# Patient Record
Sex: FEMALE | Race: OTHER
[De-identification: names, ages, dates, MRNs, and addresses within clinical notes are randomized per-mention and may not be internally consistent; named-entity substitution may affect disease eponyms.]

---

## 2023-03-07 ENCOUNTER — APPOINTMENT (OUTPATIENT)
Dept: PEDIATRIC CARDIOLOGY | Facility: CLINIC | Age: 33
End: 2023-03-07
Payer: COMMERCIAL

## 2023-03-07 PROBLEM — Z00.00 ENCOUNTER FOR PREVENTIVE HEALTH EXAMINATION: Status: ACTIVE | Noted: 2023-03-07

## 2023-03-07 PROCEDURE — 99202 OFFICE O/P NEW SF 15 MIN: CPT | Mod: 25

## 2023-03-07 PROCEDURE — 76825 ECHO EXAM OF FETAL HEART: CPT

## 2023-03-07 PROCEDURE — 76827 ECHO EXAM OF FETAL HEART: CPT

## 2023-03-07 PROCEDURE — 93325 DOPPLER ECHO COLOR FLOW MAPG: CPT

## 2023-06-06 ENCOUNTER — INPATIENT (INPATIENT)
Facility: HOSPITAL | Age: 33
LOS: 11 days | Discharge: HOME HEALTH | End: 2023-06-18
Attending: INTERNAL MEDICINE | Admitting: OBSTETRICS & GYNECOLOGY
Payer: COMMERCIAL

## 2023-06-06 VITALS — HEIGHT: 63 IN | WEIGHT: 205.03 LBS

## 2023-06-06 DIAGNOSIS — O26.899 OTHER SPECIFIED PREGNANCY RELATED CONDITIONS, UNSPECIFIED TRIMESTER: ICD-10-CM

## 2023-06-06 DIAGNOSIS — Z3A.00 WEEKS OF GESTATION OF PREGNANCY NOT SPECIFIED: ICD-10-CM

## 2023-06-06 LAB
ALBUMIN SERPL ELPH-MCNC: 3.3 G/DL — SIGNIFICANT CHANGE UP (ref 3.3–5)
ALP SERPL-CCNC: 255 U/L — HIGH (ref 40–120)
ALT FLD-CCNC: 11 U/L — SIGNIFICANT CHANGE UP (ref 10–45)
ANION GAP SERPL CALC-SCNC: 13 MMOL/L — SIGNIFICANT CHANGE UP (ref 5–17)
APTT BLD: 27.5 SEC — SIGNIFICANT CHANGE UP (ref 27.5–35.5)
AST SERPL-CCNC: 22 U/L — SIGNIFICANT CHANGE UP (ref 10–40)
BASOPHILS # BLD AUTO: 0.04 K/UL — SIGNIFICANT CHANGE UP (ref 0–0.2)
BASOPHILS NFR BLD AUTO: 0.5 % — SIGNIFICANT CHANGE UP (ref 0–2)
BILIRUB SERPL-MCNC: 0.7 MG/DL — SIGNIFICANT CHANGE UP (ref 0.2–1.2)
BLD GP AB SCN SERPL QL: NEGATIVE — SIGNIFICANT CHANGE UP
BUN SERPL-MCNC: 11 MG/DL — SIGNIFICANT CHANGE UP (ref 7–23)
CALCIUM SERPL-MCNC: 8.8 MG/DL — SIGNIFICANT CHANGE UP (ref 8.4–10.5)
CHLORIDE SERPL-SCNC: 105 MMOL/L — SIGNIFICANT CHANGE UP (ref 96–108)
CO2 SERPL-SCNC: 18 MMOL/L — LOW (ref 22–31)
CREAT ?TM UR-MCNC: 201 MG/DL — SIGNIFICANT CHANGE UP
CREAT SERPL-MCNC: 0.95 MG/DL — SIGNIFICANT CHANGE UP (ref 0.5–1.3)
EGFR: 82 ML/MIN/1.73M2 — SIGNIFICANT CHANGE UP
EOSINOPHIL # BLD AUTO: 0.05 K/UL — SIGNIFICANT CHANGE UP (ref 0–0.5)
EOSINOPHIL NFR BLD AUTO: 0.6 % — SIGNIFICANT CHANGE UP (ref 0–6)
FIBRINOGEN PPP-MCNC: 353 MG/DL — SIGNIFICANT CHANGE UP (ref 200–445)
GLUCOSE SERPL-MCNC: 72 MG/DL — SIGNIFICANT CHANGE UP (ref 70–99)
HCT VFR BLD CALC: 30.6 % — LOW (ref 34.5–45)
HGB BLD-MCNC: 9.5 G/DL — LOW (ref 11.5–15.5)
IMM GRANULOCYTES NFR BLD AUTO: 0.9 % — SIGNIFICANT CHANGE UP (ref 0–0.9)
INR BLD: 0.88 — SIGNIFICANT CHANGE UP (ref 0.88–1.16)
LDH SERPL L TO P-CCNC: 203 U/L — SIGNIFICANT CHANGE UP (ref 50–242)
LYMPHOCYTES # BLD AUTO: 1.96 K/UL — SIGNIFICANT CHANGE UP (ref 1–3.3)
LYMPHOCYTES # BLD AUTO: 24.3 % — SIGNIFICANT CHANGE UP (ref 13–44)
MCHC RBC-ENTMCNC: 26.6 PG — LOW (ref 27–34)
MCHC RBC-ENTMCNC: 31 GM/DL — LOW (ref 32–36)
MCV RBC AUTO: 85.7 FL — SIGNIFICANT CHANGE UP (ref 80–100)
MONOCYTES # BLD AUTO: 0.83 K/UL — SIGNIFICANT CHANGE UP (ref 0–0.9)
MONOCYTES NFR BLD AUTO: 10.3 % — SIGNIFICANT CHANGE UP (ref 2–14)
NEUTROPHILS # BLD AUTO: 5.12 K/UL — SIGNIFICANT CHANGE UP (ref 1.8–7.4)
NEUTROPHILS NFR BLD AUTO: 63.4 % — SIGNIFICANT CHANGE UP (ref 43–77)
NRBC # BLD: 3 /100 WBCS — HIGH (ref 0–0)
PLATELET # BLD AUTO: 153 K/UL — SIGNIFICANT CHANGE UP (ref 150–400)
POTASSIUM SERPL-MCNC: 4.2 MMOL/L — SIGNIFICANT CHANGE UP (ref 3.5–5.3)
POTASSIUM SERPL-SCNC: 4.2 MMOL/L — SIGNIFICANT CHANGE UP (ref 3.5–5.3)
PROT ?TM UR-MCNC: 367.6 MG/DL — SIGNIFICANT CHANGE UP (ref 0–12)
PROT SERPL-MCNC: 6.1 G/DL — SIGNIFICANT CHANGE UP (ref 6–8.3)
PROT/CREAT UR-RTO: 1.8 RATIO — SIGNIFICANT CHANGE UP (ref 0–0.2)
PROTHROM AB SERPL-ACNC: 10.5 SEC — SIGNIFICANT CHANGE UP (ref 10.5–13.4)
RBC # BLD: 3.57 M/UL — LOW (ref 3.8–5.2)
RBC # FLD: 17 % — HIGH (ref 10.3–14.5)
RH IG SCN BLD-IMP: POSITIVE — SIGNIFICANT CHANGE UP
RH IG SCN BLD-IMP: POSITIVE — SIGNIFICANT CHANGE UP
SODIUM SERPL-SCNC: 136 MMOL/L — SIGNIFICANT CHANGE UP (ref 135–145)
URATE SERPL-MCNC: 7.4 MG/DL — HIGH (ref 2.5–7)
WBC # BLD: 8.07 K/UL — SIGNIFICANT CHANGE UP (ref 3.8–10.5)
WBC # FLD AUTO: 8.07 K/UL — SIGNIFICANT CHANGE UP (ref 3.8–10.5)

## 2023-06-06 RX ORDER — SODIUM CHLORIDE 9 MG/ML
1000 INJECTION, SOLUTION INTRAVENOUS
Refills: 0 | Status: DISCONTINUED | OUTPATIENT
Start: 2023-06-06 | End: 2023-06-07

## 2023-06-06 RX ORDER — OXYTOCIN 10 UNIT/ML
VIAL (ML) INJECTION
Qty: 30 | Refills: 0 | Status: DISCONTINUED | OUTPATIENT
Start: 2023-06-06 | End: 2023-06-07

## 2023-06-06 RX ORDER — OXYTOCIN 10 UNIT/ML
333.33 VIAL (ML) INJECTION
Qty: 20 | Refills: 0 | Status: DISCONTINUED | OUTPATIENT
Start: 2023-06-06 | End: 2023-06-07

## 2023-06-06 RX ORDER — ONDANSETRON 8 MG/1
8 TABLET, FILM COATED ORAL ONCE
Refills: 0 | Status: COMPLETED | OUTPATIENT
Start: 2023-06-06 | End: 2023-06-06

## 2023-06-06 RX ADMIN — SODIUM CHLORIDE 125 MILLILITER(S): 9 INJECTION, SOLUTION INTRAVENOUS at 23:05

## 2023-06-06 RX ADMIN — ONDANSETRON 8 MILLIGRAM(S): 8 TABLET, FILM COATED ORAL at 23:42

## 2023-06-06 NOTE — PATIENT PROFILE OB - HEIGHT IN FEET
5 Advancement-Rotation Flap Text: The defect edges were debeveled with a #15c scalpel blade.  Given the location of the defect, shape of the defect and the proximity to free margins an advancement-rotation flap was deemed most appropriate.  Using a sterile surgical marker, an appropriate flap was drawn incorporating the defect and placing the expected incisions within the relaxed skin tension lines where possible. The area thus outlined was incised deep to adipose tissue with a #15c scalpel blade.  The skin margins were undermined to an appropriate distance in all directions utilizing iris scissors.

## 2023-06-06 NOTE — PATIENT PROFILE OB - AS DELIV COMPLICATIONS OB
abnormal fetal heart rate tracing/chorioamnionitis/maternal fever/peripartum hemorrhage/meconium stained fluid

## 2023-06-07 LAB
ALBUMIN SERPL ELPH-MCNC: 3.1 G/DL — LOW (ref 3.3–5)
ALBUMIN SERPL ELPH-MCNC: 3.2 G/DL — LOW (ref 3.3–5)
ALBUMIN SERPL ELPH-MCNC: 3.3 G/DL — SIGNIFICANT CHANGE UP (ref 3.3–5)
ALP SERPL-CCNC: 244 U/L — HIGH (ref 40–120)
ALP SERPL-CCNC: 258 U/L — HIGH (ref 40–120)
ALP SERPL-CCNC: 269 U/L — HIGH (ref 40–120)
ALT FLD-CCNC: 11 U/L — SIGNIFICANT CHANGE UP (ref 10–45)
ALT FLD-CCNC: 12 U/L — SIGNIFICANT CHANGE UP (ref 10–45)
ALT FLD-CCNC: 13 U/L — SIGNIFICANT CHANGE UP (ref 10–45)
ANION GAP SERPL CALC-SCNC: 14 MMOL/L — SIGNIFICANT CHANGE UP (ref 5–17)
ANION GAP SERPL CALC-SCNC: 14 MMOL/L — SIGNIFICANT CHANGE UP (ref 5–17)
ANION GAP SERPL CALC-SCNC: 9 MMOL/L — SIGNIFICANT CHANGE UP (ref 5–17)
APTT BLD: 23.9 SEC — LOW (ref 27.5–35.5)
APTT BLD: 27.6 SEC — SIGNIFICANT CHANGE UP (ref 27.5–35.5)
APTT BLD: 27.9 SEC — SIGNIFICANT CHANGE UP (ref 27.5–35.5)
APTT BLD: 32.1 SEC — SIGNIFICANT CHANGE UP (ref 27.5–35.5)
AST SERPL-CCNC: 19 U/L — SIGNIFICANT CHANGE UP (ref 10–40)
AST SERPL-CCNC: 20 U/L — SIGNIFICANT CHANGE UP (ref 10–40)
AST SERPL-CCNC: 24 U/L — SIGNIFICANT CHANGE UP (ref 10–40)
BASOPHILS # BLD AUTO: 0.02 K/UL — SIGNIFICANT CHANGE UP (ref 0–0.2)
BASOPHILS # BLD AUTO: 0.03 K/UL — SIGNIFICANT CHANGE UP (ref 0–0.2)
BASOPHILS # BLD AUTO: 0.03 K/UL — SIGNIFICANT CHANGE UP (ref 0–0.2)
BASOPHILS # BLD AUTO: 0.04 K/UL — SIGNIFICANT CHANGE UP (ref 0–0.2)
BASOPHILS NFR BLD AUTO: 0.1 % — SIGNIFICANT CHANGE UP (ref 0–2)
BASOPHILS NFR BLD AUTO: 0.2 % — SIGNIFICANT CHANGE UP (ref 0–2)
BASOPHILS NFR BLD AUTO: 0.2 % — SIGNIFICANT CHANGE UP (ref 0–2)
BASOPHILS NFR BLD AUTO: 0.3 % — SIGNIFICANT CHANGE UP (ref 0–2)
BILIRUB SERPL-MCNC: 0.6 MG/DL — SIGNIFICANT CHANGE UP (ref 0.2–1.2)
BILIRUB SERPL-MCNC: 0.8 MG/DL — SIGNIFICANT CHANGE UP (ref 0.2–1.2)
BILIRUB SERPL-MCNC: 1.1 MG/DL — SIGNIFICANT CHANGE UP (ref 0.2–1.2)
BUN SERPL-MCNC: 8 MG/DL — SIGNIFICANT CHANGE UP (ref 7–23)
BUN SERPL-MCNC: 9 MG/DL — SIGNIFICANT CHANGE UP (ref 7–23)
BUN SERPL-MCNC: 9 MG/DL — SIGNIFICANT CHANGE UP (ref 7–23)
CALCIUM SERPL-MCNC: 8.7 MG/DL — SIGNIFICANT CHANGE UP (ref 8.4–10.5)
CALCIUM SERPL-MCNC: 9 MG/DL — SIGNIFICANT CHANGE UP (ref 8.4–10.5)
CALCIUM SERPL-MCNC: 9.3 MG/DL — SIGNIFICANT CHANGE UP (ref 8.4–10.5)
CHLORIDE SERPL-SCNC: 104 MMOL/L — SIGNIFICANT CHANGE UP (ref 96–108)
CHLORIDE SERPL-SCNC: 106 MMOL/L — SIGNIFICANT CHANGE UP (ref 96–108)
CHLORIDE SERPL-SCNC: 107 MMOL/L — SIGNIFICANT CHANGE UP (ref 96–108)
CO2 SERPL-SCNC: 16 MMOL/L — LOW (ref 22–31)
CO2 SERPL-SCNC: 17 MMOL/L — LOW (ref 22–31)
CO2 SERPL-SCNC: 20 MMOL/L — LOW (ref 22–31)
COVID-19 SPIKE DOMAIN AB INTERP: POSITIVE
COVID-19 SPIKE DOMAIN ANTIBODY RESULT: >250 U/ML — HIGH
CREAT SERPL-MCNC: 1.02 MG/DL — SIGNIFICANT CHANGE UP (ref 0.5–1.3)
CREAT SERPL-MCNC: 1.07 MG/DL — SIGNIFICANT CHANGE UP (ref 0.5–1.3)
CREAT SERPL-MCNC: 1.25 MG/DL — SIGNIFICANT CHANGE UP (ref 0.5–1.3)
EGFR: 59 ML/MIN/1.73M2 — LOW
EGFR: 71 ML/MIN/1.73M2 — SIGNIFICANT CHANGE UP
EGFR: 75 ML/MIN/1.73M2 — SIGNIFICANT CHANGE UP
EOSINOPHIL # BLD AUTO: 0 K/UL — SIGNIFICANT CHANGE UP (ref 0–0.5)
EOSINOPHIL # BLD AUTO: 0 K/UL — SIGNIFICANT CHANGE UP (ref 0–0.5)
EOSINOPHIL # BLD AUTO: 0.01 K/UL — SIGNIFICANT CHANGE UP (ref 0–0.5)
EOSINOPHIL # BLD AUTO: 0.01 K/UL — SIGNIFICANT CHANGE UP (ref 0–0.5)
EOSINOPHIL NFR BLD AUTO: 0 % — SIGNIFICANT CHANGE UP (ref 0–6)
EOSINOPHIL NFR BLD AUTO: 0 % — SIGNIFICANT CHANGE UP (ref 0–6)
EOSINOPHIL NFR BLD AUTO: 0.1 % — SIGNIFICANT CHANGE UP (ref 0–6)
EOSINOPHIL NFR BLD AUTO: 0.1 % — SIGNIFICANT CHANGE UP (ref 0–6)
FIBRINOGEN PPP-MCNC: 349 MG/DL — SIGNIFICANT CHANGE UP (ref 200–445)
FIBRINOGEN PPP-MCNC: 355 MG/DL — SIGNIFICANT CHANGE UP (ref 200–445)
FIBRINOGEN PPP-MCNC: 411 MG/DL — SIGNIFICANT CHANGE UP (ref 200–445)
FIBRINOGEN PPP-MCNC: 418 MG/DL — SIGNIFICANT CHANGE UP (ref 200–445)
GLUCOSE SERPL-MCNC: 76 MG/DL — SIGNIFICANT CHANGE UP (ref 70–99)
GLUCOSE SERPL-MCNC: 77 MG/DL — SIGNIFICANT CHANGE UP (ref 70–99)
GLUCOSE SERPL-MCNC: 77 MG/DL — SIGNIFICANT CHANGE UP (ref 70–99)
HCT VFR BLD CALC: 31.3 % — LOW (ref 34.5–45)
HCT VFR BLD CALC: 32.8 % — LOW (ref 34.5–45)
HCT VFR BLD CALC: 33 % — LOW (ref 34.5–45)
HCT VFR BLD CALC: 33.7 % — LOW (ref 34.5–45)
HGB BLD-MCNC: 10.2 G/DL — LOW (ref 11.5–15.5)
HGB BLD-MCNC: 10.2 G/DL — LOW (ref 11.5–15.5)
HGB BLD-MCNC: 10.5 G/DL — LOW (ref 11.5–15.5)
HGB BLD-MCNC: 9.7 G/DL — LOW (ref 11.5–15.5)
IMM GRANULOCYTES NFR BLD AUTO: 0.5 % — SIGNIFICANT CHANGE UP (ref 0–0.9)
IMM GRANULOCYTES NFR BLD AUTO: 0.6 % — SIGNIFICANT CHANGE UP (ref 0–0.9)
IMM GRANULOCYTES NFR BLD AUTO: 0.8 % — SIGNIFICANT CHANGE UP (ref 0–0.9)
IMM GRANULOCYTES NFR BLD AUTO: 0.9 % — SIGNIFICANT CHANGE UP (ref 0–0.9)
INR BLD: 0.91 — SIGNIFICANT CHANGE UP (ref 0.88–1.16)
INR BLD: 0.91 — SIGNIFICANT CHANGE UP (ref 0.88–1.16)
INR BLD: 0.93 — SIGNIFICANT CHANGE UP (ref 0.88–1.16)
LDH SERPL L TO P-CCNC: 206 U/L — SIGNIFICANT CHANGE UP (ref 50–242)
LDH SERPL L TO P-CCNC: 229 U/L — SIGNIFICANT CHANGE UP (ref 50–242)
LYMPHOCYTES # BLD AUTO: 0.89 K/UL — LOW (ref 1–3.3)
LYMPHOCYTES # BLD AUTO: 1.44 K/UL — SIGNIFICANT CHANGE UP (ref 1–3.3)
LYMPHOCYTES # BLD AUTO: 1.48 K/UL — SIGNIFICANT CHANGE UP (ref 1–3.3)
LYMPHOCYTES # BLD AUTO: 1.59 K/UL — SIGNIFICANT CHANGE UP (ref 1–3.3)
LYMPHOCYTES # BLD AUTO: 12.6 % — LOW (ref 13–44)
LYMPHOCYTES # BLD AUTO: 14.9 % — SIGNIFICANT CHANGE UP (ref 13–44)
LYMPHOCYTES # BLD AUTO: 5.2 % — LOW (ref 13–44)
LYMPHOCYTES # BLD AUTO: 7.8 % — LOW (ref 13–44)
MCHC RBC-ENTMCNC: 26.9 PG — LOW (ref 27–34)
MCHC RBC-ENTMCNC: 26.9 PG — LOW (ref 27–34)
MCHC RBC-ENTMCNC: 27 PG — SIGNIFICANT CHANGE UP (ref 27–34)
MCHC RBC-ENTMCNC: 27.1 PG — SIGNIFICANT CHANGE UP (ref 27–34)
MCHC RBC-ENTMCNC: 30.9 GM/DL — LOW (ref 32–36)
MCHC RBC-ENTMCNC: 31 GM/DL — LOW (ref 32–36)
MCHC RBC-ENTMCNC: 31.1 GM/DL — LOW (ref 32–36)
MCHC RBC-ENTMCNC: 31.2 GM/DL — LOW (ref 32–36)
MCV RBC AUTO: 86.4 FL — SIGNIFICANT CHANGE UP (ref 80–100)
MCV RBC AUTO: 87.1 FL — SIGNIFICANT CHANGE UP (ref 80–100)
MCV RBC AUTO: 87.2 FL — SIGNIFICANT CHANGE UP (ref 80–100)
MCV RBC AUTO: 87.2 FL — SIGNIFICANT CHANGE UP (ref 80–100)
MONOCYTES # BLD AUTO: 0.5 K/UL — SIGNIFICANT CHANGE UP (ref 0–0.9)
MONOCYTES # BLD AUTO: 0.74 K/UL — SIGNIFICANT CHANGE UP (ref 0–0.9)
MONOCYTES # BLD AUTO: 0.81 K/UL — SIGNIFICANT CHANGE UP (ref 0–0.9)
MONOCYTES # BLD AUTO: 1.08 K/UL — HIGH (ref 0–0.9)
MONOCYTES NFR BLD AUTO: 4.2 % — SIGNIFICANT CHANGE UP (ref 2–14)
MONOCYTES NFR BLD AUTO: 4.3 % — SIGNIFICANT CHANGE UP (ref 2–14)
MONOCYTES NFR BLD AUTO: 5.3 % — SIGNIFICANT CHANGE UP (ref 2–14)
MONOCYTES NFR BLD AUTO: 8.4 % — SIGNIFICANT CHANGE UP (ref 2–14)
NEUTROPHILS # BLD AUTO: 15.33 K/UL — HIGH (ref 1.8–7.4)
NEUTROPHILS # BLD AUTO: 17.39 K/UL — HIGH (ref 1.8–7.4)
NEUTROPHILS # BLD AUTO: 7.28 K/UL — SIGNIFICANT CHANGE UP (ref 1.8–7.4)
NEUTROPHILS # BLD AUTO: 9.7 K/UL — HIGH (ref 1.8–7.4)
NEUTROPHILS NFR BLD AUTO: 75.6 % — SIGNIFICANT CHANGE UP (ref 43–77)
NEUTROPHILS NFR BLD AUTO: 82.2 % — HIGH (ref 43–77)
NEUTROPHILS NFR BLD AUTO: 85.9 % — HIGH (ref 43–77)
NEUTROPHILS NFR BLD AUTO: 89.8 % — HIGH (ref 43–77)
NRBC # BLD: 0 /100 WBCS — SIGNIFICANT CHANGE UP (ref 0–0)
PLATELET # BLD AUTO: 125 K/UL — LOW (ref 150–400)
PLATELET # BLD AUTO: 145 K/UL — LOW (ref 150–400)
PLATELET # BLD AUTO: 149 K/UL — LOW (ref 150–400)
PLATELET # BLD AUTO: 149 K/UL — LOW (ref 150–400)
POTASSIUM SERPL-MCNC: 3.8 MMOL/L — SIGNIFICANT CHANGE UP (ref 3.5–5.3)
POTASSIUM SERPL-MCNC: 4.3 MMOL/L — SIGNIFICANT CHANGE UP (ref 3.5–5.3)
POTASSIUM SERPL-MCNC: 4.5 MMOL/L — SIGNIFICANT CHANGE UP (ref 3.5–5.3)
POTASSIUM SERPL-SCNC: 3.8 MMOL/L — SIGNIFICANT CHANGE UP (ref 3.5–5.3)
POTASSIUM SERPL-SCNC: 4.3 MMOL/L — SIGNIFICANT CHANGE UP (ref 3.5–5.3)
POTASSIUM SERPL-SCNC: 4.5 MMOL/L — SIGNIFICANT CHANGE UP (ref 3.5–5.3)
PROT SERPL-MCNC: 5.6 G/DL — LOW (ref 6–8.3)
PROT SERPL-MCNC: 6 G/DL — SIGNIFICANT CHANGE UP (ref 6–8.3)
PROT SERPL-MCNC: 6.3 G/DL — SIGNIFICANT CHANGE UP (ref 6–8.3)
PROTHROM AB SERPL-ACNC: 10.8 SEC — SIGNIFICANT CHANGE UP (ref 10.5–13.4)
PROTHROM AB SERPL-ACNC: 10.8 SEC — SIGNIFICANT CHANGE UP (ref 10.5–13.4)
PROTHROM AB SERPL-ACNC: 11 SEC — SIGNIFICANT CHANGE UP (ref 10.5–13.4)
RBC # BLD: 3.59 M/UL — LOW (ref 3.8–5.2)
RBC # BLD: 3.76 M/UL — LOW (ref 3.8–5.2)
RBC # BLD: 3.79 M/UL — LOW (ref 3.8–5.2)
RBC # BLD: 3.9 M/UL — SIGNIFICANT CHANGE UP (ref 3.8–5.2)
RBC # FLD: 17.4 % — HIGH (ref 10.3–14.5)
RBC # FLD: 18.2 % — HIGH (ref 10.3–14.5)
RBC # FLD: 18.5 % — HIGH (ref 10.3–14.5)
RBC # FLD: 18.6 % — HIGH (ref 10.3–14.5)
SARS-COV-2 IGG+IGM SERPL QL IA: >250 U/ML — HIGH
SARS-COV-2 IGG+IGM SERPL QL IA: POSITIVE
SODIUM SERPL-SCNC: 134 MMOL/L — LOW (ref 135–145)
SODIUM SERPL-SCNC: 136 MMOL/L — SIGNIFICANT CHANGE UP (ref 135–145)
SODIUM SERPL-SCNC: 137 MMOL/L — SIGNIFICANT CHANGE UP (ref 135–145)
T PALLIDUM AB TITR SER: NEGATIVE — SIGNIFICANT CHANGE UP
URATE SERPL-MCNC: 7.7 MG/DL — HIGH (ref 2.5–7)
URATE SERPL-MCNC: 8.1 MG/DL — HIGH (ref 2.5–7)
WBC # BLD: 11.79 K/UL — HIGH (ref 3.8–10.5)
WBC # BLD: 17.09 K/UL — HIGH (ref 3.8–10.5)
WBC # BLD: 20.28 K/UL — HIGH (ref 3.8–10.5)
WBC # BLD: 9.64 K/UL — SIGNIFICANT CHANGE UP (ref 3.8–10.5)
WBC # FLD AUTO: 11.79 K/UL — HIGH (ref 3.8–10.5)
WBC # FLD AUTO: 17.09 K/UL — HIGH (ref 3.8–10.5)
WBC # FLD AUTO: 20.28 K/UL — HIGH (ref 3.8–10.5)
WBC # FLD AUTO: 9.64 K/UL — SIGNIFICANT CHANGE UP (ref 3.8–10.5)

## 2023-06-07 PROCEDURE — 88307 TISSUE EXAM BY PATHOLOGIST: CPT | Mod: 26

## 2023-06-07 RX ORDER — OXYCODONE HYDROCHLORIDE 5 MG/1
5 TABLET ORAL
Refills: 0 | Status: DISCONTINUED | OUTPATIENT
Start: 2023-06-07 | End: 2023-06-07

## 2023-06-07 RX ORDER — AER TRAVELER 0.5 G/1
1 SOLUTION RECTAL; TOPICAL EVERY 4 HOURS
Refills: 0 | Status: DISCONTINUED | OUTPATIENT
Start: 2023-06-07 | End: 2023-06-18

## 2023-06-07 RX ORDER — SODIUM CHLORIDE 9 MG/ML
3 INJECTION INTRAMUSCULAR; INTRAVENOUS; SUBCUTANEOUS EVERY 8 HOURS
Refills: 0 | Status: DISCONTINUED | OUTPATIENT
Start: 2023-06-07 | End: 2023-06-18

## 2023-06-07 RX ORDER — AMPICILLIN TRIHYDRATE 250 MG
2 CAPSULE ORAL EVERY 6 HOURS
Refills: 0 | Status: DISCONTINUED | OUTPATIENT
Start: 2023-06-07 | End: 2023-06-07

## 2023-06-07 RX ORDER — CEFAZOLIN SODIUM 1 G
VIAL (EA) INJECTION
Refills: 0 | Status: DISCONTINUED | OUTPATIENT
Start: 2023-06-07 | End: 2023-06-08

## 2023-06-07 RX ORDER — OXYTOCIN 10 UNIT/ML
41.67 VIAL (ML) INJECTION
Qty: 20 | Refills: 0 | Status: DISCONTINUED | OUTPATIENT
Start: 2023-06-07 | End: 2023-06-08

## 2023-06-07 RX ORDER — TRANEXAMIC ACID 100 MG/ML
1000 INJECTION, SOLUTION INTRAVENOUS ONCE
Refills: 0 | Status: COMPLETED | OUTPATIENT
Start: 2023-06-07 | End: 2023-06-07

## 2023-06-07 RX ORDER — BENZOCAINE 10 %
1 GEL (GRAM) MUCOUS MEMBRANE EVERY 6 HOURS
Refills: 0 | Status: DISCONTINUED | OUTPATIENT
Start: 2023-06-07 | End: 2023-06-18

## 2023-06-07 RX ORDER — CEFAZOLIN SODIUM 1 G
2000 VIAL (EA) INJECTION ONCE
Refills: 0 | Status: COMPLETED | OUTPATIENT
Start: 2023-06-07 | End: 2023-06-07

## 2023-06-07 RX ORDER — ACETAMINOPHEN 500 MG
975 TABLET ORAL
Refills: 0 | Status: DISCONTINUED | OUTPATIENT
Start: 2023-06-07 | End: 2023-06-18

## 2023-06-07 RX ORDER — KETOROLAC TROMETHAMINE 30 MG/ML
30 SYRINGE (ML) INJECTION ONCE
Refills: 0 | Status: DISCONTINUED | OUTPATIENT
Start: 2023-06-07 | End: 2023-06-07

## 2023-06-07 RX ORDER — MAGNESIUM SULFATE 500 MG/ML
4 VIAL (ML) INJECTION ONCE
Refills: 0 | Status: COMPLETED | OUTPATIENT
Start: 2023-06-07 | End: 2023-06-07

## 2023-06-07 RX ORDER — OXYCODONE HYDROCHLORIDE 5 MG/1
5 TABLET ORAL ONCE
Refills: 0 | Status: DISCONTINUED | OUTPATIENT
Start: 2023-06-07 | End: 2023-06-14

## 2023-06-07 RX ORDER — IBUPROFEN 200 MG
600 TABLET ORAL EVERY 6 HOURS
Refills: 0 | Status: DISCONTINUED | OUTPATIENT
Start: 2023-06-07 | End: 2023-06-08

## 2023-06-07 RX ORDER — NALOXONE HYDROCHLORIDE 4 MG/.1ML
0.1 SPRAY NASAL
Refills: 0 | Status: DISCONTINUED | OUTPATIENT
Start: 2023-06-07 | End: 2023-06-07

## 2023-06-07 RX ORDER — CEFAZOLIN SODIUM 1 G
2000 VIAL (EA) INJECTION EVERY 8 HOURS
Refills: 0 | Status: DISCONTINUED | OUTPATIENT
Start: 2023-06-08 | End: 2023-06-08

## 2023-06-07 RX ORDER — OXYTOCIN 10 UNIT/ML
6 VIAL (ML) INJECTION
Qty: 30 | Refills: 0 | Status: DISCONTINUED | OUTPATIENT
Start: 2023-06-07 | End: 2023-06-07

## 2023-06-07 RX ORDER — GENTAMICIN SULFATE 40 MG/ML
340 VIAL (ML) INJECTION EVERY 24 HOURS
Refills: 0 | Status: DISCONTINUED | OUTPATIENT
Start: 2023-06-07 | End: 2023-06-08

## 2023-06-07 RX ORDER — MAGNESIUM SULFATE 500 MG/ML
2 VIAL (ML) INJECTION
Qty: 40 | Refills: 0 | Status: DISCONTINUED | OUTPATIENT
Start: 2023-06-07 | End: 2023-06-08

## 2023-06-07 RX ORDER — TETANUS TOXOID, REDUCED DIPHTHERIA TOXOID AND ACELLULAR PERTUSSIS VACCINE, ADSORBED 5; 2.5; 8; 8; 2.5 [IU]/.5ML; [IU]/.5ML; UG/.5ML; UG/.5ML; UG/.5ML
0.5 SUSPENSION INTRAMUSCULAR ONCE
Refills: 0 | Status: DISCONTINUED | OUTPATIENT
Start: 2023-06-07 | End: 2023-06-18

## 2023-06-07 RX ORDER — ACETAMINOPHEN 500 MG
1000 TABLET ORAL ONCE
Refills: 0 | Status: COMPLETED | OUTPATIENT
Start: 2023-06-07 | End: 2023-06-07

## 2023-06-07 RX ORDER — DEXAMETHASONE 0.5 MG/5ML
4 ELIXIR ORAL EVERY 6 HOURS
Refills: 0 | Status: DISCONTINUED | OUTPATIENT
Start: 2023-06-07 | End: 2023-06-07

## 2023-06-07 RX ORDER — LANOLIN
1 OINTMENT (GRAM) TOPICAL EVERY 6 HOURS
Refills: 0 | Status: DISCONTINUED | OUTPATIENT
Start: 2023-06-07 | End: 2023-06-18

## 2023-06-07 RX ORDER — CARBOPROST TROMETHAMINE 250 UG/ML
250 INJECTION, SOLUTION INTRAMUSCULAR ONCE
Refills: 0 | Status: COMPLETED | OUTPATIENT
Start: 2023-06-07 | End: 2023-06-07

## 2023-06-07 RX ORDER — SIMETHICONE 80 MG/1
80 TABLET, CHEWABLE ORAL EVERY 4 HOURS
Refills: 0 | Status: DISCONTINUED | OUTPATIENT
Start: 2023-06-07 | End: 2023-06-18

## 2023-06-07 RX ORDER — OXYTOCIN 10 UNIT/ML
20 VIAL (ML) INJECTION ONCE
Refills: 0 | Status: COMPLETED | OUTPATIENT
Start: 2023-06-07 | End: 2023-06-07

## 2023-06-07 RX ORDER — FENTANYL/BUPIVACAINE/NS/PF 2MCG/ML-.1
250 PLASTIC BAG, INJECTION (ML) INJECTION
Refills: 0 | Status: DISCONTINUED | OUTPATIENT
Start: 2023-06-07 | End: 2023-06-07

## 2023-06-07 RX ORDER — MAGNESIUM SULFATE 500 MG/ML
2 VIAL (ML) INJECTION
Qty: 40 | Refills: 0 | Status: DISCONTINUED | OUTPATIENT
Start: 2023-06-07 | End: 2023-06-07

## 2023-06-07 RX ORDER — DIPHENHYDRAMINE HCL 50 MG
25 CAPSULE ORAL EVERY 6 HOURS
Refills: 0 | Status: DISCONTINUED | OUTPATIENT
Start: 2023-06-07 | End: 2023-06-18

## 2023-06-07 RX ORDER — OXYTOCIN 10 UNIT/ML
20 VIAL (ML) INJECTION ONCE
Refills: 0 | Status: DISCONTINUED | OUTPATIENT
Start: 2023-06-07 | End: 2023-06-07

## 2023-06-07 RX ORDER — MAGNESIUM HYDROXIDE 400 MG/1
30 TABLET, CHEWABLE ORAL
Refills: 0 | Status: DISCONTINUED | OUTPATIENT
Start: 2023-06-07 | End: 2023-06-18

## 2023-06-07 RX ORDER — HYDROCORTISONE 1 %
1 OINTMENT (GRAM) TOPICAL EVERY 6 HOURS
Refills: 0 | Status: DISCONTINUED | OUTPATIENT
Start: 2023-06-07 | End: 2023-06-18

## 2023-06-07 RX ORDER — ONDANSETRON 8 MG/1
4 TABLET, FILM COATED ORAL EVERY 6 HOURS
Refills: 0 | Status: DISCONTINUED | OUTPATIENT
Start: 2023-06-07 | End: 2023-06-07

## 2023-06-07 RX ORDER — DIBUCAINE 1 %
1 OINTMENT (GRAM) RECTAL EVERY 6 HOURS
Refills: 0 | Status: DISCONTINUED | OUTPATIENT
Start: 2023-06-07 | End: 2023-06-18

## 2023-06-07 RX ORDER — PRAMOXINE HYDROCHLORIDE 150 MG/15G
1 AEROSOL, FOAM RECTAL EVERY 4 HOURS
Refills: 0 | Status: DISCONTINUED | OUTPATIENT
Start: 2023-06-07 | End: 2023-06-18

## 2023-06-07 RX ADMIN — Medication 216 GRAM(S): at 16:15

## 2023-06-07 RX ADMIN — CARBOPROST TROMETHAMINE 250 MICROGRAM(S): 250 INJECTION, SOLUTION INTRAMUSCULAR at 21:45

## 2023-06-07 RX ADMIN — Medication 400 MILLIGRAM(S): at 15:55

## 2023-06-07 RX ADMIN — Medication 100 MILLIGRAM(S): at 23:11

## 2023-06-07 RX ADMIN — SODIUM CHLORIDE 125 MILLILITER(S): 9 INJECTION, SOLUTION INTRAVENOUS at 06:52

## 2023-06-07 RX ADMIN — TRANEXAMIC ACID 220 MILLIGRAM(S): 100 INJECTION, SOLUTION INTRAVENOUS at 21:32

## 2023-06-07 RX ADMIN — Medication 20 UNIT(S): at 21:32

## 2023-06-07 RX ADMIN — Medication 300 GRAM(S): at 23:12

## 2023-06-07 RX ADMIN — Medication 25 GM/HR: at 23:40

## 2023-06-07 RX ADMIN — Medication 2 MILLIUNIT(S)/MIN: at 01:12

## 2023-06-07 RX ADMIN — Medication 30 MILLIGRAM(S): at 21:54

## 2023-06-07 RX ADMIN — Medication 200 MILLIGRAM(S): at 16:56

## 2023-06-07 RX ADMIN — Medication 100 MILLIGRAM(S): at 23:46

## 2023-06-07 RX ADMIN — SODIUM CHLORIDE 125 MILLILITER(S): 9 INJECTION, SOLUTION INTRAVENOUS at 04:23

## 2023-06-08 LAB
ALBUMIN SERPL ELPH-MCNC: 2.4 G/DL — LOW (ref 3.3–5)
ALBUMIN SERPL ELPH-MCNC: 2.5 G/DL — LOW (ref 3.3–5)
ALP SERPL-CCNC: 171 U/L — HIGH (ref 40–120)
ALP SERPL-CCNC: 178 U/L — HIGH (ref 40–120)
ALT FLD-CCNC: 10 U/L — SIGNIFICANT CHANGE UP (ref 10–45)
ALT FLD-CCNC: 9 U/L — LOW (ref 10–45)
ANION GAP SERPL CALC-SCNC: 10 MMOL/L — SIGNIFICANT CHANGE UP (ref 5–17)
ANION GAP SERPL CALC-SCNC: 11 MMOL/L — SIGNIFICANT CHANGE UP (ref 5–17)
ANISOCYTOSIS BLD QL: SLIGHT — SIGNIFICANT CHANGE UP
APPEARANCE UR: CLEAR — SIGNIFICANT CHANGE UP
APTT BLD: 27 SEC — LOW (ref 27.5–35.5)
APTT BLD: 29.4 SEC — SIGNIFICANT CHANGE UP (ref 27.5–35.5)
AST SERPL-CCNC: 24 U/L — SIGNIFICANT CHANGE UP (ref 10–40)
AST SERPL-CCNC: 26 U/L — SIGNIFICANT CHANGE UP (ref 10–40)
BACTERIA # UR AUTO: PRESENT /HPF
BASOPHILS # BLD AUTO: 0 K/UL — SIGNIFICANT CHANGE UP (ref 0–0.2)
BASOPHILS NFR BLD AUTO: 0 % — SIGNIFICANT CHANGE UP (ref 0–2)
BILIRUB SERPL-MCNC: 0.9 MG/DL — SIGNIFICANT CHANGE UP (ref 0.2–1.2)
BILIRUB SERPL-MCNC: 1.2 MG/DL — SIGNIFICANT CHANGE UP (ref 0.2–1.2)
BILIRUB UR-MCNC: NEGATIVE — SIGNIFICANT CHANGE UP
BUN SERPL-MCNC: 10 MG/DL — SIGNIFICANT CHANGE UP (ref 7–23)
BUN SERPL-MCNC: 9 MG/DL — SIGNIFICANT CHANGE UP (ref 7–23)
BURR CELLS BLD QL SMEAR: PRESENT — SIGNIFICANT CHANGE UP
CALCIUM SERPL-MCNC: 8.1 MG/DL — LOW (ref 8.4–10.5)
CALCIUM SERPL-MCNC: 8.4 MG/DL — SIGNIFICANT CHANGE UP (ref 8.4–10.5)
CHLORIDE SERPL-SCNC: 105 MMOL/L — SIGNIFICANT CHANGE UP (ref 96–108)
CHLORIDE SERPL-SCNC: 107 MMOL/L — SIGNIFICANT CHANGE UP (ref 96–108)
CO2 SERPL-SCNC: 16 MMOL/L — LOW (ref 22–31)
CO2 SERPL-SCNC: 21 MMOL/L — LOW (ref 22–31)
COLOR SPEC: YELLOW — SIGNIFICANT CHANGE UP
CREAT ?TM UR-MCNC: 17 MG/DL — SIGNIFICANT CHANGE UP
CREAT SERPL-MCNC: 1.23 MG/DL — SIGNIFICANT CHANGE UP (ref 0.5–1.3)
CREAT SERPL-MCNC: 1.41 MG/DL — HIGH (ref 0.5–1.3)
DACRYOCYTES BLD QL SMEAR: SIGNIFICANT CHANGE UP
DIFF PNL FLD: ABNORMAL
E COLI DNA BLD POS QL NAA+NON-PROBE: SIGNIFICANT CHANGE UP
EGFR: 51 ML/MIN/1.73M2 — LOW
EGFR: 60 ML/MIN/1.73M2 — SIGNIFICANT CHANGE UP
EOSINOPHIL # BLD AUTO: 0 K/UL — SIGNIFICANT CHANGE UP (ref 0–0.5)
EOSINOPHIL NFR BLD AUTO: 0 % — SIGNIFICANT CHANGE UP (ref 0–6)
EPI CELLS # UR: SIGNIFICANT CHANGE UP /HPF (ref 0–5)
FIBRINOGEN PPP-MCNC: 321 MG/DL — SIGNIFICANT CHANGE UP (ref 200–445)
FIBRINOGEN PPP-MCNC: 452 MG/DL — HIGH (ref 200–445)
GAS PNL BLDA: SIGNIFICANT CHANGE UP
GLUCOSE SERPL-MCNC: 109 MG/DL — HIGH (ref 70–99)
GLUCOSE SERPL-MCNC: 72 MG/DL — SIGNIFICANT CHANGE UP (ref 70–99)
GLUCOSE UR QL: NEGATIVE — SIGNIFICANT CHANGE UP
GRAM STN FLD: SIGNIFICANT CHANGE UP
HAPTOGLOB SERPL-MCNC: 59 MG/DL — SIGNIFICANT CHANGE UP (ref 34–200)
HCT VFR BLD CALC: 28.6 % — LOW (ref 34.5–45)
HCT VFR BLD CALC: 30.8 % — LOW (ref 34.5–45)
HGB BLD-MCNC: 10.2 G/DL — LOW (ref 11.5–15.5)
HGB BLD-MCNC: 9.4 G/DL — LOW (ref 11.5–15.5)
HYPOCHROMIA BLD QL: SLIGHT — SIGNIFICANT CHANGE UP
INR BLD: 0.93 — SIGNIFICANT CHANGE UP (ref 0.88–1.16)
INR BLD: 1.01 — SIGNIFICANT CHANGE UP (ref 0.88–1.16)
KETONES UR-MCNC: NEGATIVE — SIGNIFICANT CHANGE UP
LACTATE SERPL-SCNC: 2.2 MMOL/L — HIGH (ref 0.5–2)
LDH SERPL L TO P-CCNC: 283 U/L — HIGH (ref 50–242)
LEUKOCYTE ESTERASE UR-ACNC: NEGATIVE — SIGNIFICANT CHANGE UP
LYMPHOCYTES # BLD AUTO: 1.34 K/UL — SIGNIFICANT CHANGE UP (ref 1–3.3)
LYMPHOCYTES # BLD AUTO: 5.2 % — LOW (ref 13–44)
MACROCYTES BLD QL: SLIGHT — SIGNIFICANT CHANGE UP
MAGNESIUM SERPL-MCNC: 3 MG/DL — HIGH (ref 1.6–2.6)
MAGNESIUM SERPL-MCNC: 3.5 MG/DL — HIGH (ref 1.6–2.6)
MANUAL SMEAR VERIFICATION: SIGNIFICANT CHANGE UP
MCHC RBC-ENTMCNC: 27.5 PG — SIGNIFICANT CHANGE UP (ref 27–34)
MCHC RBC-ENTMCNC: 27.6 PG — SIGNIFICANT CHANGE UP (ref 27–34)
MCHC RBC-ENTMCNC: 32.9 GM/DL — SIGNIFICANT CHANGE UP (ref 32–36)
MCHC RBC-ENTMCNC: 33.1 GM/DL — SIGNIFICANT CHANGE UP (ref 32–36)
MCV RBC AUTO: 83.5 FL — SIGNIFICANT CHANGE UP (ref 80–100)
MCV RBC AUTO: 83.6 FL — SIGNIFICANT CHANGE UP (ref 80–100)
METAMYELOCYTES # FLD: 2.6 % — HIGH (ref 0–0)
METHOD TYPE: SIGNIFICANT CHANGE UP
MICROCYTES BLD QL: SLIGHT — SIGNIFICANT CHANGE UP
MONOCYTES # BLD AUTO: 0.9 K/UL — SIGNIFICANT CHANGE UP (ref 0–0.9)
MONOCYTES NFR BLD AUTO: 3.5 % — SIGNIFICANT CHANGE UP (ref 2–14)
NEUTROPHILS # BLD AUTO: 22.8 K/UL — HIGH (ref 1.8–7.4)
NEUTROPHILS NFR BLD AUTO: 81.7 % — HIGH (ref 43–77)
NEUTS BAND # BLD: 7 % — SIGNIFICANT CHANGE UP (ref 0–8)
NITRITE UR-MCNC: NEGATIVE — SIGNIFICANT CHANGE UP
NRBC # BLD: 0 /100 WBCS — SIGNIFICANT CHANGE UP (ref 0–0)
NRBC # BLD: 1 /100 — HIGH (ref 0–0)
NRBC # BLD: SIGNIFICANT CHANGE UP /100 WBCS (ref 0–0)
OSMOLALITY UR: 238 MOSM/KG — LOW (ref 300–900)
OVALOCYTES BLD QL SMEAR: SIGNIFICANT CHANGE UP
PH UR: 6 — SIGNIFICANT CHANGE UP (ref 5–8)
PHOSPHATE SERPL-MCNC: 4 MG/DL — SIGNIFICANT CHANGE UP (ref 2.5–4.5)
PLAT MORPH BLD: NORMAL — SIGNIFICANT CHANGE UP
PLATELET # BLD AUTO: 108 K/UL — LOW (ref 150–400)
PLATELET # BLD AUTO: 114 K/UL — LOW (ref 150–400)
POIKILOCYTOSIS BLD QL AUTO: SIGNIFICANT CHANGE UP
POLYCHROMASIA BLD QL SMEAR: SLIGHT — SIGNIFICANT CHANGE UP
POTASSIUM SERPL-MCNC: 4.1 MMOL/L — SIGNIFICANT CHANGE UP (ref 3.5–5.3)
POTASSIUM SERPL-MCNC: 4.2 MMOL/L — SIGNIFICANT CHANGE UP (ref 3.5–5.3)
POTASSIUM SERPL-SCNC: 4.1 MMOL/L — SIGNIFICANT CHANGE UP (ref 3.5–5.3)
POTASSIUM SERPL-SCNC: 4.2 MMOL/L — SIGNIFICANT CHANGE UP (ref 3.5–5.3)
PROCALCITONIN SERPL-MCNC: 1.9 NG/ML — HIGH (ref 0.02–0.1)
PROT SERPL-MCNC: 4.6 G/DL — LOW (ref 6–8.3)
PROT SERPL-MCNC: 4.9 G/DL — LOW (ref 6–8.3)
PROT UR-MCNC: NEGATIVE MG/DL — SIGNIFICANT CHANGE UP
PROTHROM AB SERPL-ACNC: 11.1 SEC — SIGNIFICANT CHANGE UP (ref 10.5–13.4)
PROTHROM AB SERPL-ACNC: 12 SEC — SIGNIFICANT CHANGE UP (ref 10.5–13.4)
RBC # BLD: 3.42 M/UL — LOW (ref 3.8–5.2)
RBC # BLD: 3.69 M/UL — LOW (ref 3.8–5.2)
RBC # FLD: 17.7 % — HIGH (ref 10.3–14.5)
RBC # FLD: 18.7 % — HIGH (ref 10.3–14.5)
RBC BLD AUTO: ABNORMAL
RBC CASTS # UR COMP ASSIST: > 10 /HPF
SODIUM SERPL-SCNC: 132 MMOL/L — LOW (ref 135–145)
SODIUM SERPL-SCNC: 138 MMOL/L — SIGNIFICANT CHANGE UP (ref 135–145)
SODIUM UR-SCNC: 96 MMOL/L — SIGNIFICANT CHANGE UP
SP GR SPEC: 1.01 — SIGNIFICANT CHANGE UP (ref 1–1.03)
SPECIMEN SOURCE: SIGNIFICANT CHANGE UP
SPHEROCYTES BLD QL SMEAR: SLIGHT — SIGNIFICANT CHANGE UP
UROBILINOGEN FLD QL: 0.2 E.U./DL — SIGNIFICANT CHANGE UP
WBC # BLD: 20.71 K/UL — HIGH (ref 3.8–10.5)
WBC # BLD: 25.71 K/UL — HIGH (ref 3.8–10.5)
WBC # FLD AUTO: 20.71 K/UL — HIGH (ref 3.8–10.5)
WBC # FLD AUTO: 25.71 K/UL — HIGH (ref 3.8–10.5)
WBC UR QL: < 5 /HPF — SIGNIFICANT CHANGE UP

## 2023-06-08 PROCEDURE — 71045 X-RAY EXAM CHEST 1 VIEW: CPT | Mod: 26

## 2023-06-08 PROCEDURE — 99291 CRITICAL CARE FIRST HOUR: CPT

## 2023-06-08 PROCEDURE — 99222 1ST HOSP IP/OBS MODERATE 55: CPT

## 2023-06-08 PROCEDURE — 93306 TTE W/DOPPLER COMPLETE: CPT | Mod: 26

## 2023-06-08 RX ORDER — PIPERACILLIN AND TAZOBACTAM 4; .5 G/20ML; G/20ML
3.38 INJECTION, POWDER, LYOPHILIZED, FOR SOLUTION INTRAVENOUS ONCE
Refills: 0 | Status: COMPLETED | OUTPATIENT
Start: 2023-06-08 | End: 2023-06-08

## 2023-06-08 RX ORDER — LABETALOL HCL 100 MG
10 TABLET ORAL ONCE
Refills: 0 | Status: COMPLETED | OUTPATIENT
Start: 2023-06-08 | End: 2023-06-08

## 2023-06-08 RX ORDER — FUROSEMIDE 40 MG
40 TABLET ORAL ONCE
Refills: 0 | Status: COMPLETED | OUTPATIENT
Start: 2023-06-08 | End: 2023-06-08

## 2023-06-08 RX ORDER — LABETALOL HCL 100 MG
20 TABLET ORAL ONCE
Refills: 0 | Status: DISCONTINUED | OUTPATIENT
Start: 2023-06-08 | End: 2023-06-08

## 2023-06-08 RX ORDER — LABETALOL HCL 100 MG
200 TABLET ORAL EVERY 12 HOURS
Refills: 0 | Status: DISCONTINUED | OUTPATIENT
Start: 2023-06-08 | End: 2023-06-10

## 2023-06-08 RX ORDER — OXYTOCIN 10 UNIT/ML
33.33 VIAL (ML) INJECTION
Qty: 20 | Refills: 0 | Status: DISCONTINUED | OUTPATIENT
Start: 2023-06-08 | End: 2023-06-08

## 2023-06-08 RX ORDER — FUROSEMIDE 40 MG
20 TABLET ORAL ONCE
Refills: 0 | Status: COMPLETED | OUTPATIENT
Start: 2023-06-08 | End: 2023-06-08

## 2023-06-08 RX ORDER — HEPARIN SODIUM 5000 [USP'U]/ML
7500 INJECTION INTRAVENOUS; SUBCUTANEOUS EVERY 8 HOURS
Refills: 0 | Status: DISCONTINUED | OUTPATIENT
Start: 2023-06-08 | End: 2023-06-11

## 2023-06-08 RX ORDER — CEFTRIAXONE 500 MG/1
2000 INJECTION, POWDER, FOR SOLUTION INTRAMUSCULAR; INTRAVENOUS EVERY 24 HOURS
Refills: 0 | Status: DISCONTINUED | OUTPATIENT
Start: 2023-06-08 | End: 2023-06-08

## 2023-06-08 RX ORDER — TRANEXAMIC ACID 100 MG/ML
1000 INJECTION, SOLUTION INTRAVENOUS ONCE
Refills: 0 | Status: COMPLETED | OUTPATIENT
Start: 2023-06-08 | End: 2023-06-08

## 2023-06-08 RX ORDER — PIPERACILLIN AND TAZOBACTAM 4; .5 G/20ML; G/20ML
3.38 INJECTION, POWDER, LYOPHILIZED, FOR SOLUTION INTRAVENOUS EVERY 8 HOURS
Refills: 0 | Status: DISCONTINUED | OUTPATIENT
Start: 2023-06-08 | End: 2023-06-10

## 2023-06-08 RX ADMIN — Medication 975 MILLIGRAM(S): at 10:21

## 2023-06-08 RX ADMIN — HEPARIN SODIUM 7500 UNIT(S): 5000 INJECTION INTRAVENOUS; SUBCUTANEOUS at 23:00

## 2023-06-08 RX ADMIN — PIPERACILLIN AND TAZOBACTAM 25 GRAM(S): 4; .5 INJECTION, POWDER, LYOPHILIZED, FOR SOLUTION INTRAVENOUS at 12:31

## 2023-06-08 RX ADMIN — Medication 10 MILLIGRAM(S): at 17:53

## 2023-06-08 RX ADMIN — Medication 975 MILLIGRAM(S): at 21:03

## 2023-06-08 RX ADMIN — Medication 100 MILLIGRAM(S): at 07:33

## 2023-06-08 RX ADMIN — Medication 975 MILLIGRAM(S): at 16:30

## 2023-06-08 RX ADMIN — Medication 975 MILLIGRAM(S): at 15:42

## 2023-06-08 RX ADMIN — Medication 975 MILLIGRAM(S): at 09:40

## 2023-06-08 RX ADMIN — SODIUM CHLORIDE 3 MILLILITER(S): 9 INJECTION INTRAMUSCULAR; INTRAVENOUS; SUBCUTANEOUS at 22:00

## 2023-06-08 RX ADMIN — PIPERACILLIN AND TAZOBACTAM 25 GRAM(S): 4; .5 INJECTION, POWDER, LYOPHILIZED, FOR SOLUTION INTRAVENOUS at 19:23

## 2023-06-08 RX ADMIN — Medication 20 MILLIGRAM(S): at 18:55

## 2023-06-08 RX ADMIN — Medication 975 MILLIGRAM(S): at 03:47

## 2023-06-08 RX ADMIN — Medication 30 MILLIGRAM(S): at 01:12

## 2023-06-08 RX ADMIN — Medication 200 MILLIGRAM(S): at 19:59

## 2023-06-08 RX ADMIN — TRANEXAMIC ACID 220 MILLIGRAM(S): 100 INJECTION, SOLUTION INTRAVENOUS at 05:25

## 2023-06-08 RX ADMIN — Medication 40 MILLIGRAM(S): at 10:11

## 2023-06-08 RX ADMIN — PIPERACILLIN AND TAZOBACTAM 200 GRAM(S): 4; .5 INJECTION, POWDER, LYOPHILIZED, FOR SOLUTION INTRAVENOUS at 05:57

## 2023-06-08 RX ADMIN — Medication 125 MILLIUNIT(S)/MIN: at 01:15

## 2023-06-08 RX ADMIN — Medication 975 MILLIGRAM(S): at 22:30

## 2023-06-08 RX ADMIN — SODIUM CHLORIDE 3 MILLILITER(S): 9 INJECTION INTRAMUSCULAR; INTRAVENOUS; SUBCUTANEOUS at 14:04

## 2023-06-08 RX ADMIN — Medication 1 TABLET(S): at 12:23

## 2023-06-08 RX ADMIN — Medication 975 MILLIGRAM(S): at 07:20

## 2023-06-08 NOTE — PROGRESS NOTE ADULT - ASSESSMENT
Assessment/Plan:    32y y.o. F now PPD #1  s/p  c/b E. coli sepsis 2/2 chorio, PEC w/ SF (Cr, scotoma, BPs), and PPH 2/2 atony (EBL 1300), transferred to MICU overnight for higher level of care given development of pulmonary edema and desaturations on RA, now satting well on RA and bleeding controlled, but persistently febrile on abx, new evidence of cardiac dysfunction and pulmonary hypertension on echo c/f peripartum cardiomyopathy.    Recommendations:  Preeclampsia with severe features (BPs, Cr, scotoma)  - S/p 12h IV Mg for seizure ppx - discussed with MFM, plan to continue to hold Mg for now given uptrending Cr. Mild clonus present, but patient is otherwise asymptomatic. OB will continue to monitor closely.  - Maintain BPs strictly <160/110. Push IV antihypertensives for BPs >/=160/110 (recommended pushes IV hydralazine 10, IV labetalol 20, or PO nifedipine IR 10)  - Continue standing Labetalol 200 q8h  - Continue strict I&Os  - Monitor for PEC symptoms including headache, dizziness, visual disturbances/scotoma, nausea/vomiting, RUQ pain, epigastric pain, and SOB - currently asymptomatic  - If suspected seizure activity, call OB STAT overhead and administer 4g IV Mg sulfate bolus  - MFM consulted and continuing to follow  - Please obtain q6h labs including CBC w/ diff, CMP, Mg, Phos, PT/PTT, fibrinogen  Postpartum Hemorrhage  - S/p NIKKI -  - Strict pad counts  - If hemorrhaging, call OB STAT overhead  Chorioamnionitis/Sepsis  - F/u blood cx  - prelim E. coli  - F/u blood cx   - Patient currently remains febrile - fever of this degree/length of time is not c/w recent cytotec use  - ID consulted, f/u recs, abx per ID  R/o postpartum cardiomyopathy + Pulmonary HTN  - Please consult Dr. Leone, OB cardiologist for recs  - Please order BNP with next lab draw  - Consider repeat echo in AM after adequate diuresis to monitor for improvement  General Postpartum  - Tylenol 1000 q6 ATC, oxycodone 5 q4h PRN. Consider holding motrin due to elevated Cr  - Continue prenatal vitamins  - S/p lactation consultation, will bring breast pump to bedside  - Dermoplast spray, witch hazel pads, and dibucaine ointment for labial laceration  - High risk for postpartum VTE/PE - please start prophylactic SQH now that vaginal bleeding is stable    Remainder of care per MICU primary  To contact OB team, call [phone] (labor and delivery desk), page [phone], or (quickest) call [email or web address] ([phone]) and ask for "O.B. resident"      Miranda Rhodes MD, PGY3  CHELSIE Vora MD, OB   CHELSIE Gutiérrez MD, M Assessment/Plan:    32y y.o. F now PPD #1  s/p  c/b E. coli sepsis 2/2 chorio, PEC w/ SF (Cr, scotoma, BPs), and PPH 2/2 atony (EBL 1300), transferred to MICU overnight for higher level of care given development of pulmonary edema and desaturations on RA, now satting well on RA and bleeding controlled, but persistently febrile on abx, worsening HTN now a/p push and on PO antihypertensives, plus new evidence of cardiac dysfunction and pulmonary hypertension on echo. Echo findings along with worsening HTN are c/f postpartum cardiomyopathy.    Recommendations:  Preeclampsia with severe features (BPs, Cr, scotoma)  - S/p 12h IV Mg for seizure ppx - discussed with MFM, plan to continue to hold Mg for now given uptrending Cr. Mild clonus present, but patient is otherwise asymptomatic. OB will continue to monitor closely.  - Maintain BPs strictly <160/110. Push IV antihypertensives for BPs >/=160/110 (recommended pushes IV hydralazine 10, IV labetalol 20, or PO nifedipine IR 10)  - Continue standing Labetalol 200 q8h  - Continue strict I&Os  - Monitor for PEC symptoms including headache, dizziness, visual disturbances/scotoma, nausea/vomiting, RUQ pain, epigastric pain, and SOB - currently asymptomatic  - If suspected seizure activity, call OB STAT overhead and administer 4g IV Mg sulfate bolus  - MFM consulted and continuing to follow  - Please obtain q6h labs including CBC w/ diff, CMP, Mg, Phos, PT/PTT, fibrinogen  Postpartum Hemorrhage  - S/p NIKKI -  - Strict pad counts  - If hemorrhaging, call OB STAT overhead  Chorioamnionitis/Sepsis  - F/u blood cx  - prelim E. coli  - F/u blood cx   - Patient currently remains febrile - fever of this degree/length of time is not c/w recent cytotec use  - ID consulted, f/u recs, abx per ID  R/o postpartum cardiomyopathy + Pulmonary HTN  - Please consult Dr. Leone, OB cardiologist for recs  - Please order BNP with next lab draw  - Consider repeat echo in AM after adequate diuresis to monitor for improvement  General Postpartum  - Tylenol 1000 q6 ATC, oxycodone 5 q4h PRN. Consider holding motrin due to elevated Cr  - Continue prenatal vitamins  - S/p lactation consultation, will bring breast pump to bedside  - Dermoplast spray, witch hazel pads, and dibucaine ointment for labial laceration  - High risk for postpartum VTE/PE - please start prophylactic SQH now that vaginal bleeding is stable    Remainder of care per MICU primary  To contact OB team, call [phone] (labor and delivery desk), page [phone], or (quickest) call [email or web address] ([phone]) and ask for "O.B. resident"      Miranda Rhodes MD, PGY3  CHELSIE Vora MD, OB   CHELSIE Gutiérrez MD, M Assessment/Plan:    32y y.o. F now PPD #1  s/p  c/b E. coli sepsis 2/2 chorio, PEC w/ SF (Cr, scotoma, BPs), and PPH 2/2 atony (EBL 1300), transferred to MICU overnight for higher level of care given development of pulmonary edema and desaturations on RA, now satting well on RA and bleeding controlled, but persistently febrile on abx, worsening HTN now a/p push and on PO antihypertensives, plus new evidence of cardiac dysfunction and pulmonary hypertension on echo. Echo findings along with worsening HTN are c/f postpartum cardiomyopathy.    Recommendations:  Preeclampsia with severe features (BPs, Cr, scotoma)  - S/p 12h IV Mg for seizure ppx - discussed with MFM, plan to continue to hold Mg for now given uptrending Cr. Mild clonus present, but patient is otherwise asymptomatic. OB will continue to monitor closely.  - Maintain BPs strictly <160/110. Push IV antihypertensives for BPs >/=160/110 (recommended pushes IV hydralazine 10, IV labetalol 20, or PO nifedipine IR 10)  - Continue standing Labetalol 200 q8h  - Continue strict I&Os  - Monitor for PEC symptoms including headache, dizziness, visual disturbances/scotoma, nausea/vomiting, RUQ pain, epigastric pain, and SOB - currently asymptomatic  - If suspected seizure activity, call OB STAT overhead and administer 4g IV Mg sulfate bolus  - MFM consulted and continuing to follow  - Please obtain q6h labs including CBC w/ diff, CMP, Mg, Phos, PT/PTT, fibrinogen  Postpartum Hemorrhage  - S/p NIKKI -  - Strict pad counts  - Maintain active T&S  - If hemorrhaging, call OB STAT overhead  Chorioamnionitis/Sepsis  - F/u blood cx  - prelim E. coli  - F/u blood cx   - Patient currently remains febrile - fever of this degree/length of time is not c/w recent cytotec use  - ID consulted, f/u recs, abx per ID  R/o postpartum cardiomyopathy + Pulmonary HTN  - Please consult Dr. Leone, OB cardiologist for recs  - Please order BNP with next lab draw  - Consider repeat echo in AM after adequate diuresis to monitor for improvement  General Postpartum  - Tylenol 1000 q6 ATC, oxycodone 5 q4h PRN. Consider holding motrin due to elevated Cr  - Continue prenatal vitamins  - S/p lactation consultation, will bring breast pump to bedside  - Dermoplast spray, witch hazel pads, and dibucaine ointment for labial laceration  - High risk for postpartum VTE/PE - please start prophylactic SQH now that vaginal bleeding is stable    Remainder of care per MICU primary  To contact OB team, call [phone] (labor and delivery desk), page [phone], or (quickest) call [employer] ([phone]) and ask for "O.B. resident"      Miranda Rhodes MD, PGY3  CHELSIE Vora MD, OB   CHELSIE Gutiérrez MD, [facility]

## 2023-06-08 NOTE — CONSULT NOTE ADULT - SUBJECTIVE AND OBJECTIVE BOX
CRITICAL CARE - INITIAL CONSULT NOTE    LAURIE QURESHI  [id number]    Patient is a 32y old  Female who presents with a chief complaint of sob    HPI: Ms. Qureshi is a 33yo  woman w/ hx HTN initially admitted to OB L&D on  for induction of labor s/p vaginal delivery  c/b preeclampsia w/ severe features and sepsis in setting of chorioamnionitis and GNR bacteremia 2/2 E.coli. Patient's pre-delivery hospital course, notable for being treated for chorioamnionitis w/ IV abx gentamicin, clindamycin, and ampicillin and received approximately total 5L IVF cumulative since admission. Patient underwent vaginal delivery of full-term fetus at approx 10PM on  w/ post-delivery complication of heavy vaginal bleeding managed w/ TXA and placement of intrauterine device connected to suction.      PAST MEDICAL & SURGICAL HISTORY:      acetaminophen     Tablet .. 975 milliGRAM(s) Oral <User Schedule>  benzocaine 20%/menthol 0.5% Spray 1 Spray(s) Topical every 6 hours PRN  clindamycin IVPB      clindamycin IVPB 900 milliGRAM(s) IV Intermittent every 8 hours  dibucaine 1% Ointment 1 Application(s) Topical every 6 hours PRN  diphenhydrAMINE 25 milliGRAM(s) Oral every 6 hours PRN  diphtheria/tetanus/pertussis (acellular) Vaccine (Adacel) 0.5 milliLiter(s) IntraMuscular once  gentamicin   IVPB 340 milliGRAM(s) IV Intermittent every 24 hours  hydrocortisone 1% Cream 1 Application(s) Topical every 6 hours PRN  ibuprofen  Tablet. 600 milliGRAM(s) Oral every 6 hours  lanolin Ointment 1 Application(s) Topical every 6 hours PRN  magnesium hydroxide Suspension 30 milliLiter(s) Oral two times a day PRN  magnesium sulfate Infusion 1 Gm/Hr IV Continuous <Continuous>  oxyCODONE    IR 5 milliGRAM(s) Oral once PRN  oxyCODONE    IR 5 milliGRAM(s) Oral every 3 hours PRN  oxytocin Infusion 33.333 milliUNIT(s)/Min IV Continuous <Continuous>  oxytocin Infusion 41.667 milliUNIT(s)/Min IV Continuous <Continuous>  piperacillin/tazobactam IVPB.- 3.375 Gram(s) IV Intermittent once  piperacillin/tazobactam IVPB.- 3.375 Gram(s) IV Intermittent once  piperacillin/tazobactam IVPB.. 3.375 Gram(s) IV Intermittent every 8 hours  pramoxine 1%/zinc 5% Cream 1 Application(s) Topical every 4 hours PRN  prenatal multivitamin 1 Tablet(s) Oral daily  simethicone 80 milliGRAM(s) Chew every 4 hours PRN  sodium chloride 0.9% lock flush 3 milliLiter(s) IV Push every 8 hours  witch hazel Pads 1 Application(s) Topical every 4 hours PRN      FAMILY HISTORY:      REVIEW OF SYSTEMS:  CONSTITUTIONAL: No weakness, fever, or chills  EYES: No eye pain or discharge  ENMT:  No sinus or throat pain  NECK: No pain or stiffness  RESPIRATORY: No cough, wheezing, chills or hemoptysis; No shortness of breath  CARDIOVASCULAR: No chest pain, palpitations, dizziness, or leg swelling  GASTROINTESTINAL: No abdominal or epigastric pain. No nausea, vomiting, or hematemesis; No diarrhea or constipation. No melena or hematochezia.  GENITOURINARY: No dysuria or incontinence  NEUROLOGICAL: No headaches, memory loss, loss of strength, numbness, or tremors  SKIN: No new rashes  MUSCULOSKELETAL: No joint pain or swelling; No muscle, back, or extremity pain  HEME/LYMPH: No easy bruising, or bleeding gums      PHYSICAL EXAM:  T(C): 37.1 (23 @ 04:30), Max: 38.2 (23 @ 22:45)  HR: 93 (23 @ 04:30) (86 - 137)  BP: 139/79 (23 @ 04:30) (93/61 - 139/79)  RR: 17 (23 @ 04:30) (16 - 17)  SpO2: 97% (23 @ 04:30) (97% - 99%)    General: NAD, laying in bed, speaking in full sentences  HEENT: head NC/AT, no conjunctival injection, EOMI, MMM  Neck: supple, no JVD  Cardio: RRR, +S1/S2, no M/R/G  Resp: lungs CTAB, no cough/wheezes/rales/rhonchi  Abdo: soft, NT, ND, +bowel sounds x4, no organomegaly or palpable mass    Extremities: WWP, no edema/cyanosis/clubbing   Vasc: 2+ radial and DP pulses b/l  Neuro: A&Ox3  Psych: speech non-pressured, thoughts goal-oriented  Skin: dry, intact, no visible jaundice   MSK: no joint swelling      Consultant(s) Notes Reviewed:  [x ] YES  [ ] NO  Care Discussed with Consultants/Other Providers [ x] YES  [ ] NO    LABS:      23 @ 07:  -  23 @ 07:00  --------------------------------------------------------  IN: 2000 mL / OUT: 150 mL / NET: 1850 mL    23 @ 07:01  -  23 @ 05:59  --------------------------------------------------------  IN: 2445 mL / OUT: 2220 mL / NET: 225 mL          RADIOLOGY & ADDITIONAL TESTS:    Imaging Personally Reviewed:  [X] YES  [ ] NO CRITICAL CARE - INITIAL CONSULT NOTE    LAURIE QURESHI  [id number]    Patient is a 32y old  Female who presents with a chief complaint of sob    HPI: Ms. Qureshi is a 31yo  woman w/ hx HTN initially admitted to OB L&D on  for induction of labor s/p vaginal delivery  c/b preeclampsia w/ severe features and sepsis in setting of chorioamnionitis and GNR bacteremia 2/2 E.coli. Patient's pre-delivery hospital course, notable for being treated for chorioamnionitis w/ IV abx gentamicin, clindamycin, and ampicillin and received approximately total 5L IVF cumulative since admission. Patient underwent vaginal delivery of full-term fetus at approx 10PM on  w/ post-delivery complication of heavy vaginal bleeding managed w/ TXA, 2u pRBC transfusion, and placement of intrauterine device connected to suction. Noted also to be having fevers up to 101-102F prior to delivery w/ blood cultures growing out E.coli and recommended by consulting ID team to initiate on IV CTX. Following pRBC transfusion post-delivery, patient reported mild to moderate symptoms of shortness of breath, noted for SpO2 92-94% on 2L NC, systolic BP stable b/t 150-160s, bibasilar crackles on auscultation. ICU team consulted for concern of acute hypoxic respiratory failure in setting of suspected acute pulmonary edema. She otherwise denies recent or active wheezing, chest pain, palpitations, extremity pain or swelling.      PAST MEDICAL & SURGICAL HISTORY:      acetaminophen     Tablet .. 975 milliGRAM(s) Oral <User Schedule>  benzocaine 20%/menthol 0.5% Spray 1 Spray(s) Topical every 6 hours PRN  clindamycin IVPB      clindamycin IVPB 900 milliGRAM(s) IV Intermittent every 8 hours  dibucaine 1% Ointment 1 Application(s) Topical every 6 hours PRN  diphenhydrAMINE 25 milliGRAM(s) Oral every 6 hours PRN  diphtheria/tetanus/pertussis (acellular) Vaccine (Adacel) 0.5 milliLiter(s) IntraMuscular once  gentamicin   IVPB 340 milliGRAM(s) IV Intermittent every 24 hours  hydrocortisone 1% Cream 1 Application(s) Topical every 6 hours PRN  ibuprofen  Tablet. 600 milliGRAM(s) Oral every 6 hours  lanolin Ointment 1 Application(s) Topical every 6 hours PRN  magnesium hydroxide Suspension 30 milliLiter(s) Oral two times a day PRN  magnesium sulfate Infusion 1 Gm/Hr IV Continuous <Continuous>  oxyCODONE    IR 5 milliGRAM(s) Oral once PRN  oxyCODONE    IR 5 milliGRAM(s) Oral every 3 hours PRN  oxytocin Infusion 33.333 milliUNIT(s)/Min IV Continuous <Continuous>  oxytocin Infusion 41.667 milliUNIT(s)/Min IV Continuous <Continuous>  piperacillin/tazobactam IVPB.- 3.375 Gram(s) IV Intermittent once  piperacillin/tazobactam IVPB.- 3.375 Gram(s) IV Intermittent once  piperacillin/tazobactam IVPB.. 3.375 Gram(s) IV Intermittent every 8 hours  pramoxine 1%/zinc 5% Cream 1 Application(s) Topical every 4 hours PRN  prenatal multivitamin 1 Tablet(s) Oral daily  simethicone 80 milliGRAM(s) Chew every 4 hours PRN  sodium chloride 0.9% lock flush 3 milliLiter(s) IV Push every 8 hours  witch hazel Pads 1 Application(s) Topical every 4 hours PRN      FAMILY HISTORY:      REVIEW OF SYSTEMS:  As per HPI      PHYSICAL EXAM:  T(C): 37.1 (23 @ 04:30), Max: 38.2 (23 @ 22:45)  HR: 93 (23 @ 04:30) (86 - 137)  BP: 139/79 (23 @ 04:30) (93/61 - 139/79)  RR: 17 (23 @ 04:30) (16 - 17)  SpO2: 97% (23 @ 04:30) (97% - 99%)    General: NAD, laying in bed, speaking in full sentences  HEENT: head NC/AT, no conjunctival injection, EOMI, MMM  Neck: supple, no JVD  Cardio: RRR, +S1/S2, no M/R/G  Resp: lungs CTAB, no cough/wheezes/rales/rhonchi  Abdo: soft, NT, ND, +BS, no organomegaly or palpable mass    Extremities: WWP, 1+ non-pitting edema below the knees b/l  Vasc: 2+ radial and DP pulses b/l  Neuro: A&Ox3  Psych: speech non-pressured, thoughts goal-oriented  Skin: dry, intact, no visible jaundice   MSK: no joint swelling      Consultant(s) Notes Reviewed:  [x ] YES  [ ] NO  Care Discussed with Consultants/Other Providers [ x] YES  [ ] NO    LABS:      23 @ 07:01  -  23 @ 07:00  --------------------------------------------------------  IN: 2000 mL / OUT: 150 mL / NET: 1850 mL    23 @ 07:01  -  23 @ 05:59  --------------------------------------------------------  IN: 2445 mL / OUT: 2220 mL / NET: 225 mL          RADIOLOGY & ADDITIONAL TESTS:    Imaging Personally Reviewed:  [X] YES  [ ] NO

## 2023-06-08 NOTE — PROGRESS NOTE ADULT - ASSESSMENT
Ms. Qureshi is a 31yo  woman w/ hx HTN initially admitted to OB L&D on  for induction of labor s/p vaginal delivery  c/b preeclampsia w/ severe features and sepsis in setting of chorioamnionitis and GNR bacteremia 2/2 E.coli, ICU team consulted for concern acute hypoxic respiratory failure in setting of acute pulmonary congestion 2/2 receiving large volume IVF resuscitation.    Plan (system-based):  Neuro  - AAOx3, SEBASTIAN, mentating at usual baseline    Pulmonology  #Acute hypoxic respiratory failure  Pt p/w acute dyspnea shortly following post-vaginal delivery prbc transfusion, had additionally received approx 4-5L IVF cumulatively over past 36hrs since admission on . CXR shows signs mild pulmonary vascular congestion otherwise negative for acute pleural effusions, consolidation, or signs of pneumothorax. ABG negative for acute hypercapnia.  - b/l B lines on POCUS, IV lasix 40mg x1 given.   - currently on 4LNC    Cardiology  #Preeclampsia w/ severe features  Noted for -160s both pre and post-delivery, mild lactate 2.2, elevated Cr 1.2, signs mild pulmonary edema on CXR, otherwise denies headaches however reports visual scotomas.  - SBP goal <160/<110. Give IV labetalol or hydralazine if needed    Gastrointestinal  - SEBASTIAN    Nephrology  #CARYN in setting of Preeclampsia  BUN/Cr elevated from admission Cr  0.95 to 1.23 over past 2 days.  - caution additional fluid repletion as likely in mild fluid overload state  - trend BMP Q6hrs   - trend serum Mg Q6hrs, goal serum Mg 4-8 per OB team for management of preeclampsia w/ severe features  - strict I/O  - Avoid nephrotoxic medications    Infectious  # Sepsis 2/2 Chorioamnionitis and E.coli bacteremia  Patient w  23, complicated by chorioamniotis w Tmax to 39.6, and subsequent E.coli bacteremia. Received gentamycin, ampicillin, clindamycin for choriamionitis. On zosyn for management GNR bacteremia 2/2 e.coli  - DC gent and clindamycin  - continue with zosyn.   - send UA  - ID consulted, appreciate recs   - Ob/GYN following, appreciate recs if any changes to management    Hematology  #Postpartum hemorrhage  PPH 2/2 uterine atony, received tocolytics, TXA, intrauterine device connected to suction, transfused 2u pRBCs post-delivery  - Active T and S  - pending removal of Kay device, based on OB recs  - Transfuse for HgB >7    Endocrine  No known history of DM2 or hypothyroidism  - Draw A1C and TSH  - FSG q6    Prophylaxis  Regular diet  Replete K>4, Mg>2  Hold AC given recent uterine atony and PPH  No IVF  Dispo: MICU

## 2023-06-08 NOTE — CONSULT NOTE ADULT - ASSESSMENT
Ms. Qureshi is a 33yo  woman w/ hx HTN initially admitted to OB L&D on  for induction of labor s/p vaginal delivery  c/b preeclampsia w/ severe features and sepsis in setting of chorioamnionitis and GNR bacteremia 2/2 E.coli, ICU team consulted for concern acute hypoxic respiratory failure in setting of acute pulmonary congestion 2/2 receiving large volume IVF resuscitation. Ms. Qureshi is a 31yo  woman w/ hx HTN initially admitted to OB L&D on  for induction of labor s/p vaginal delivery  c/b preeclampsia w/ severe features and sepsis in setting of chorioamnionitis and GNR bacteremia 2/2 E.coli, ICU team consulted for concern acute hypoxic respiratory failure in setting of acute pulmonary congestion 2/2 receiving large volume IVF resuscitation.    Plan (system-based):  Neuro  - AAOx3, SEBASTIAN, mentating at usual baseline    Pulmonology  #Acute hypoxic respiratory failure  Pt p/w acute dyspnea shortly following post-vaginal delivery prbc transfusion, had additionally received approx 4-5L IVF cumulatively over past 36hrs since admission on . CXR shows signs mild pulmonary vascular congestion otherwise negative for acute pleural effusions, consolidation, or signs of pneumothorax. ABG negative for acute hypercapnia.  - will perform bedside POCUS on arrival to MICU  - will place on HFNC 50/50 for now, wean as tolerated  - f/u AM procalcitonin    Cardiology  #Preeclampsia w/ severe features  Noted for -160s both pre and post-delivery, mild lactate 2.2, elevated Cr 1.2, signs mild pulmonary edema on CXR, otherwise denies headaches however reports visual scotomas.  - will follow-up collateral with consulting OB team regarding goal BP in post-delivery window, as well as any contributing hx chronic HTN    Gastrointestinal  - SEBASTIAN    Nephrology  CARYN in setting of Preeclampsia  BUN/Cr elevated from admission Cr  0.95 to 1.23 over past 2 days.  - caution additional fluid repletion as likely in mild fluid overload state  - trend BMP Q6hrs  - trend serum Mg Q6hrs, goal serum Mg 4-8 per OB team for management of preeclampsia w/ severe features  - strict I/O  - Avoid nephrotoxic medications      Patient w  23, complicated by chorioamniotis w Tmax to 39.6, and subsequent E.coli bacteremia  - Ob/GYN following, appreciate recs if any changes to management    Hematology  #H and H is stable  - Past MHx of PPH 2/2 to uterine atony, so monitor CBC closely  - Active T and S  - Transfuse for HgB >7    Endocrine  No known history of DM2 or hypothyroidism  - Draw A1C and TSH  - FSG q6    Prophylaxis  NPO for now  Replete K>4, Mg>2  Hold AC given recent uterine atony and PPH  No IVF  Dispo: MICU Ms. Qureshi is a 33yo  woman w/ hx HTN initially admitted to OB L&D on  for induction of labor s/p vaginal delivery  c/b preeclampsia w/ severe features and sepsis in setting of chorioamnionitis and GNR bacteremia 2/2 E.coli, ICU team consulted for concern acute hypoxic respiratory failure in setting of acute pulmonary congestion 2/2 receiving large volume IVF resuscitation.    Plan (system-based):  Neuro  - AAOx3, SEBASTIAN, mentating at usual baseline    Pulmonology  #Acute hypoxic respiratory failure  Pt p/w acute dyspnea shortly following post-vaginal delivery prbc transfusion, had additionally received approx 4-5L IVF cumulatively over past 36hrs since admission on . CXR shows signs mild pulmonary vascular congestion otherwise negative for acute pleural effusions, consolidation, or signs of pneumothorax. ABG negative for acute hypercapnia.  - will perform bedside POCUS on arrival to MICU  - will place on HFNC 50/50 for now, wean as tolerated  - f/u AM procalcitonin    Cardiology  #Preeclampsia w/ severe features  Noted for -160s both pre and post-delivery, mild lactate 2.2, elevated Cr 1.2, signs mild pulmonary edema on CXR, otherwise denies headaches however reports visual scotomas.  - will follow-up collateral with consulting OB team regarding goal BP in post-delivery window, as well as any contributing hx chronic HTN    Gastrointestinal  - SEBASTIAN    Nephrology  #CARYN in setting of Preeclampsia  BUN/Cr elevated from admission Cr  0.95 to 1.23 over past 2 days.  - caution additional fluid repletion as likely in mild fluid overload state  - trend BMP Q6hrs  - trend serum Mg Q6hrs, goal serum Mg 4-8 per OB team for management of preeclampsia w/ severe features  - strict I/O  - Avoid nephrotoxic medications    Infectious  # Sepsis 2/2 Chorioamnionitis and E.coli bacteremia  Patient w  23, complicated by chorioamniotis w Tmax to 39.6, and subsequent E.coli bacteremia. Received gentamycin, ampicillin, clindamycin for choriamionitis. On zosyn for management GNR bacteremia 2/2 e.coli  - f/u blood culture sensitivities, narrow zosyn to CTX if sensitivities  - send UA  - f/u ID recs  - Ob/GYN following, appreciate recs if any changes to management    Hematology  #Postpartum hemorrhage  PPH 2/2 uterine atony, received tocolytics, TXA, intrauterine device connected to suction, transfused 2u pRBCs post-delivery  - Active T and S  - f/u AM CBC, Transfuse for HgB >7    Endocrine  No known history of DM2 or hypothyroidism  - Draw A1C and TSH  - FSG q6    Prophylaxis  Regular diet  Replete K>4, Mg>2  Hold AC given recent uterine atony and PPH  No IVF  Dispo: MICU

## 2023-06-08 NOTE — PROGRESS NOTE ADULT - ASSESSMENT
32y y.o. F now PPD #1  s/p  c/b sepsis 2/2 chorioamnionitis, preeclampsia (PEC) with severe features (Cr), and postpartum hemorrhage (PPH) with EBL 1300, now being transferred to MICU for higher level of care.     Recommendations:  Preeclampsia  - Continue IV Magnesium @2g/hr for 24 hrs post delivery (OB to titrate rate) for seizure ppx  - Magnesium clinical checks and serum assay q6 hrs.  Next Mg check @1130, then 1730  - Maintain BPs strictly <160/110. Push IV antihypertensives for BPs >/=160/110 (recommend IV hydralazine 10, IV labetalol 20, or PO nifedipine IR 10)  - Strict I&Os, maintain kaba while IV Mg running  - Monitor for PEC symptoms including headache, dizziness, visual disturbances/scotoma, nausea/vomiting, RUQ pain, epigastric pain, and SOB  - If suspected seizure activity, call OB STAT overhead and administer 4g IV Mg sulfate bolus  Postpartum Hemorrhage  - Maintain NIKKI to continuous wall suction at 80mmHg  - Strict pad counts  - If hemorrhaging, call OB STAT overhead  Chorioamnionitis  - F/u blood cx   - ID consulted, f/u recs  - Continue a cephalosporin + Gent 340mg q24 + Clinda 900 Q8 for chorio until at least 24 hours afebrile. S/p amp 2g x1 and ancef 2g x1 on   General Postpartum  - Tylenol 1000 q6 ATC, oxycodone 5 q4h PRN. Consider holding motrin due to elevated Cr  - Continue prenatal vitamins  - Breast pump and bedside and lactation consultation  - Dermoplast spray, witch hazel pads, and dibucaine ointment for labial laceration  - High risk for postpartum VTE - will need VTE chemoprophylaxis after bleeding stable, recommend SCDs until chemoprophylaxis is begun    Remainder of care per MICU primary  To contact OB team, call [phone] (labor and delivery desk), page [phone], or (quickest) call [facility] ([phone]) and ask for "O.B. resident"    Miranda Rhodes MD PGY3  CHELSIE Locke MD       32y y.o. F now PPD #1  s/p  c/b sepsis 2/2 chorioamnionitis, preeclampsia (PEC) with severe features (Cr), and postpartum hemorrhage (PPH) with EBL 1300, now being transferred to MICU for higher level of care.     Recommendations:  Preeclampsia  - Continue IV Magnesium @2g/hr for 24 hrs post delivery (OB to titrate rate) for seizure ppx  - Magnesium clinical checks and serum assay q6 hrs.  Next Mg check @1130, then 1730  - Maintain BPs strictly <160/110. Push IV antihypertensives for BPs >/=160/110 (recommend IV hydralazine 10, IV labetalol 20, or PO nifedipine IR 10)  - Strict I&Os, maintain kaba while IV Mg running  - Monitor for PEC symptoms including headache, dizziness, visual disturbances/scotoma, nausea/vomiting, RUQ pain, epigastric pain, and SOB  - If suspected seizure activity, call OB STAT overhead and administer 4g IV Mg sulfate bolus  - MFM consult  Postpartum Hemorrhage  - Maintain NIKKI to continuous wall suction at 80mmHg  - Strict pad counts  - If hemorrhaging, call OB STAT overhead  Chorioamnionitis  - F/u blood cx   - ID consulted, f/u recs  - Continue a cephalosporin + Gent 340mg q24 + Clinda 900 Q8 for chorio until at least 24 hours afebrile. S/p amp 2g x1 and ancef 2g x1 on   General Postpartum  - Tylenol 1000 q6 ATC, oxycodone 5 q4h PRN. Consider holding motrin due to elevated Cr  - Continue prenatal vitamins  - Breast pump and bedside and lactation consultation  - Dermoplast spray, witch hazel pads, and dibucaine ointment for labial laceration  - High risk for postpartum VTE - will need VTE chemoprophylaxis after bleeding stable, recommend SCDs until chemoprophylaxis is begun    Remainder of care per MICU primary  To contact OB team, call [phone] (labor and delivery desk), page [phone], or (quickest) call [facility] ([phone]) and ask for "O.B. resident"    Miranda Rhodes MD PGY3  DW Chucky BYRD       32y y.o. F now PPD #1  s/p  c/b sepsis 2/2 chorioamnionitis, preeclampsia (PEC) with severe features (Cr), and postpartum hemorrhage (PPH) with EBL 1300, now being transferred to MICU for higher level of care.     Recommendations:  Preeclampsia  - Continue IV Magnesium @2g/hr for 24 hrs post delivery (OB to titrate rate) for seizure ppx  - Magnesium clinical checks and serum assay q6 hrs.  Next Mg check @1130, then 1730  - Maintain BPs strictly <160/110. Push IV antihypertensives for BPs >/=160/110 (recommend IV hydralazine 10, IV labetalol 20, or PO nifedipine IR 10)  - Strict I&Os, maintain kaba while IV Mg running  - Monitor for PEC symptoms including headache, dizziness, visual disturbances/scotoma, nausea/vomiting, RUQ pain, epigastric pain, and SOB  - If suspected seizure activity, call OB STAT overhead and administer 4g IV Mg sulfate bolus  - MFM consult  Postpartum Hemorrhage  - Maintain NIKKI to continuous wall suction at 80mmHg  - Strict pad counts  - If hemorrhaging, call OB STAT overhead  Chorioamnionitis  - F/u blood cx   - ID consulted, f/u recs  - Continue a penicillin/cephalosporin + Gent 340mg q24 + Clinda 900 Q8 for chorio until at least 24 hours afebrile. S/p amp 2g x1 and ancef 2g x1 on   General Postpartum  - Tylenol 1000 q6 ATC, oxycodone 5 q4h PRN. Consider holding motrin due to elevated Cr  - Continue prenatal vitamins  - Breast pump and bedside and lactation consultation  - Dermoplast spray, witch hazel pads, and dibucaine ointment for labial laceration  - High risk for postpartum VTE - will need VTE chemoprophylaxis after bleeding stable, recommend SCDs until chemoprophylaxis is begun    Remainder of care per MICU primary  To contact OB team, call [phone] (labor and delivery desk), page [phone], or (quickest) call [facility] ([phone]) and ask for "O.B. resident"    Miranda Rhodes MD PGY3  DW Chucky BYRD

## 2023-06-08 NOTE — PROGRESS NOTE ADULT - SUBJECTIVE AND OBJECTIVE BOX
TRANSFER NOTE / MAGNESIUM CHECK NOTE    Patient is a 32y y.o. F now PPD #1  s/p  c/b sepsis 2/2 chorioamnionitis, preeclampsia (PEC) with severe features, and postpartum hemorrhage (PPH) with EBL 1300.    History:  Patient was an induction of labor for PEC, initially without severe features that was diagnosed in the 3rd trimester, and asymptomatic. GBS culture was negative.    Ob hx:  therapeutic  D&C.  therapeutic  medical.  Gyn hx: Denies  PMH: Denies  Meds: ASA 81 mg, prenatal vitamins, IV iron transfusions  PSH: 2009 L breast lumpectomy, benign  ALL: NKDA    Chorio:  During the patient's labor course she developed a fever and was diagnosed with chorioamnionitis. T_first was 102.4 6/7 @1515, T_max was 103.3 1635. Blood cultures were drawn. IV ampicillin 6g and gentamicin 5mg/kg (340 mg) were started and IV tylenol was given. Despite these measures, the patient remained febrile. Postpartum, she was switched to ancef 2g from amp and clinda 900 was added. She eventually defervesced about 1 hour postpartum. T_last 100.8 6 @2245.    PPH:  The patient's delivery was c/b PPH 2/2 atony. She was given 40U pitocin (double pit), TXA 1g IVPB, IM hemabate (carboprost) 250 mcg, cytotec (misoprostol) 1000mcg [place]. An intrauterine bimanual massage was performed and no retained products were noted, but tone remained poor so a NIKKI device was placed to continuous wall suction (80mmHg) and the balloon was inflated to 80cc. Bleeding was subsequently controlled. A L sulcal and L labial laceration were repaired with 2-0 Vicryl suture with excellent hemostasis. Patient was tachy to 135 during the course of events, but BPs were stable. Total EBL 1200cc. Given starting Hgb 9.7, decision was made to transfuse 2u pRBCs    PEC w/ SF:  The patient met criteria for severe features of PEC after delivery with a Cr 1.25 that resulted postpartum. IV Mg was started for seizure ppx @2315. At that time, she was asymptomatic and BPs were well-controlled without medications.      Postpartum course:  Patient remained clinically stable on abx and Mag with NIKKI in place. Attempt was made to deflate NIKKI after 4 hours but tone was lost and brisk bleeding followed, so the device was reattached to suction. Blood cultures showed growth of GNRs so ID (Luke) was consulted - recommended switching from ancef to CTX, which was ordered. Patient evaluated at bedside for clinical magnesium check and to discuss blood cx findings. Patient was found to be desatting on RA.     She denies headache, dizziness, visual disturbances, nausea/vomiting, RUQ pain, epigastric pain, and SOB. Pain well controlled.         Gen: Well-appearing. NAD.  Resp: Breathing comfortably on RA, but desatting on RA to 90-92. Bilateral crackles posteriorly.  Abd: Soft, no epigastric or RUQ tenderness.  : Maki to gravity draining clear urine  Ext: Bilateral patellar reflexes 1+      Vital Signs Last 24 Hrs  T(C): 37.1 (2023 04:30), Max: 38.2 (2023 22:45)  T(F): 98.8 (2023 04:30), Max: 100.8 (2023 22:45)  HR: 93 (2023 04:30) (86 - 137)  BP: 139/79 (2023 04:30) (93/61 - 139/79)  BP(mean): --  RR: 17 (2023 04:30) (16 - 17)  SpO2: 97% (2023 04:30) (97% - 99%)    Parameters below as of 2023 04:30  Patient On (Oxygen Delivery Method): room air                           Labs                          9.7    20.28 )-----------( 145      ( 2023 22:49 )             31.3     06-07    134<L>  |  104  |  9   ----------------------------<  76  3.8   |  16<L>  |  1.25    Ca    8.7      2023 21:00    TPro  5.6<L>  /  Alb  3.1<L>  /  TBili  1.1  /  DBili  x   /  AST  20  /  ALT  12  /  AlkPhos  244<H>  06-07    PT/INR - ( 2023 21:20 )   PT: 11.0 sec;   INR: 0.93          PTT - ( 2023 22:49 )  PTT:27.9 sec        23 @ 07:01  -  23 @ 07:00  --------------------------------------------------------  IN: 2000 mL / OUT: 150 mL / NET: 1850 mL    23 @ 07:01  -  23 @ 05:39  --------------------------------------------------------  IN: 2445 mL / OUT: 2220 mL / NET: 225 mL       TRANSFER NOTE / MAGNESIUM CHECK NOTE    Patient is a 32y y.o. F now PPD #1  s/p  c/b sepsis 2/2 chorioamnionitis, preeclampsia (PEC) with severe features, and postpartum hemorrhage (PPH) with EBL 1300.    History:  Patient was an induction of labor for PEC, initially without severe features that was diagnosed in the 3rd trimester, and asymptomatic. GBS culture was negative.    Ob hx:  therapeutic  D&C.  therapeutic  medical.  Gyn hx: Denies  PMH: Denies  Meds: ASA 81 mg, prenatal vitamins, IV iron transfusions  PSH: 2009 L breast lumpectomy, benign  ALL: NKDA    Chorio:  During the patient's labor course she developed a fever and was diagnosed with chorioamnionitis. T_first was 102.4 6/7 @1515, T_max was 103.3 1635. Blood cultures were drawn. IV ampicillin 6g and gentamicin 5mg/kg (340 mg) were started and IV tylenol was given. Despite these measures, the patient remained febrile. Postpartum, she was switched to ancef 2g from amp and clinda 900 was added. She eventually defervesced about 1 hour postpartum. T_last 100.8 6 @2245.    PPH:  The patient's delivery was c/b PPH 2/2 atony. She was given 40U pitocin (double pit), TXA 1g IVPB, IM hemabate (carboprost) 250 mcg, cytotec (misoprostol) 1000mcg [place]. An intrauterine bimanual massage was performed and no retained products were noted, but tone remained poor so a NIKKI device was placed to continuous wall suction (80mmHg) and the balloon was inflated to 80cc. Bleeding was subsequently controlled. A L sulcal and L labial laceration were repaired with 2-0 Vicryl suture with excellent hemostasis. Patient was tachy to 135 during the course of events, but BPs were stable. Total EBL 1200cc. Given starting Hgb 9.7, decision was made to transfuse 2u pRBCs    PEC w/ SF:  The patient met criteria for severe features of PEC after delivery with a Cr 1.25 that resulted postpartum. IV Mg was started for seizure ppx @2315. At that time, she was asymptomatic and BPs were well-controlled without medications.      Postpartum course:  Patient remained clinically stable on abx and Mag with NIKKI in place. Attempt was made to deflate NIKKI after 4 hours but tone was lost and brisk bleeding followed, so the device was reattached to suction. Blood cultures showed growth of GNRs so ID (Luke) was consulted - recommended switching from ancef to ceftriaxone, which was ordered. Patient evaluated at bedside to discuss plan for abx and sepsis diagnosis and for clinical magnesium check. Patient was initially asymptomatic but was found to be desatting on RA to 90-92%. On auscultation, crackles were heard bilaterally in the posterior aspects, which was significantly concerning for pulmonary edema in the setting of PEC, sepsis, and recent transfusion. Urgent CXR and echo were ordered. Decision was made to consult ICU given deterioration in vital signs. Per MICU patient was given another 1g TXA IVPB and was given Zosyn instead of ceftriaxone. Transfer order was placed.      Gen: Well-appearing. NAD.  Resp: Breathing comfortably on RA, but desatting on RA to 90-92. Bilateral crackles posteriorly.  Abd: Soft, no epigastric or RUQ tenderness. Fundal tenderness to palpation. Trace bleeding around INKKI device.  : Maki to gravity draining clear urine  Ext: Bilateral patellar reflexes 1+      Vital Signs Last 24 Hrs  T(C): 37.1 (2023 04:30), Max: 38.2 (2023 22:45)  T(F): 98.8 (2023 04:30), Max: 100.8 (2023 22:45)  HR: 93 (2023 04:30) (86 - 137)  BP: 139/79 (2023 04:30) (93/61 - 139/79)  BP(mean): --  RR: 17 (2023 04:30) (16 - 17)  SpO2: 97% (2023 04:30) (97% - 99%)    Parameters below as of 2023 04:30  Patient On (Oxygen Delivery Method): room air              Labs                          9.7    20.28 )-----------( 145      ( 2023 22:49 )             31.3     06-07    134<L>  |  104  |  9   ----------------------------<  76  3.8   |  16<L>  |  1.25    Ca    8.7      2023 21:00    TPro  5.6<L>  /  Alb  3.1<L>  /  TBili  1.1  /  DBili  x   /  AST  20  /  ALT  12  /  AlkPhos  244<H>      PT/INR - ( 2023 21:20 )   PT: 11.0 sec;   INR: 0.93          PTT - ( 2023 22:49 )  PTT:27.9 sec        23 @ 07:  -  23 @ 07:00  --------------------------------------------------------  IN: 2000 mL / OUT: 150 mL / NET: 1850 mL    23 @ 07:01  -  23 @ 05:39  --------------------------------------------------------  IN: 2445 mL / OUT: 2220 mL / NET: 225 mL       TRANSFER NOTE / MAGNESIUM CHECK NOTE    Patient is a 32y y.o. F now PPD #1  s/p  c/b sepsis 2/2 chorioamnionitis, preeclampsia (PEC) with severe features, and postpartum hemorrhage (PPH) with EBL 1300.    History:  Patient was an induction of labor for PEC, initially without severe features that was diagnosed in the 3rd trimester, and asymptomatic. GBS culture was negative.    Ob hx:  therapeutic  D&C.  therapeutic  medical.  Gyn hx: Denies  PMH: Denies  Meds: ASA 81 mg, prenatal vitamins, IV iron transfusions  PSH:  L breast lumpectomy, benign  ALL: NKDA      Intrapartum Course:  Chorio:  During the patient's labor course she developed a fever and was diagnosed with chorioamnionitis. T_first was 102.4 6/7 @1515, T_max was 103.3 1635. Blood cultures were drawn. IV ampicillin 6g and gentamicin 5mg/kg (340 mg) were started and IV tylenol was given. Despite these measures, the patient remained febrile. Postpartum, she was switched to ancef 2g from amp and clinda 900 was added. She eventually defervesced about 1 hour postpartum. T_last 100.8 6/7 @2245.    PPH:  The patient's delivery was c/b PPH 2/2 atony. She was given 40U pitocin (double pit), TXA 1g IVPB, IM hemabate (carboprost) 250 mcg, cytotec (misoprostol) 1000mcg [place]. An intrauterine bimanual massage was performed and no retained products were noted, but tone remained poor so a NIKKI device was placed to continuous wall suction (80mmHg) and the balloon was inflated to 80cc. Bleeding was subsequently controlled. A L sulcal and L labial laceration were repaired with 2-0 Vicryl suture with excellent hemostasis. Patient was tachy to 135 during the course of events, but BPs were stable. Total EBL 1200cc. Given starting Hgb 9.7, decision was made to transfuse 2u pRBCs    PEC w/ SF:  The patient met criteria for severe features of PEC after delivery with a Cr 1.25 that resulted postpartum. IV Mg was started for seizure ppx @2315. At that time, she was asymptomatic and BPs were well-controlled without medications.      Postpartum course:  Patient remained clinically stable on abx and Mag with NIKKI in place. Attempt was made to deflate NIKKI after 4 hours but tone was lost and brisk bleeding followed, so the device was reattached to suction. Blood cultures showed growth of GNRs so ID (Luke) was consulted - recommended switching from ancef to ceftriaxone, which was ordered. Patient evaluated at bedside to discuss plan for abx and sepsis diagnosis and for clinical magnesium check. Patient was initially asymptomatic but was found to be desatting on RA to 90-92%. On auscultation, crackles were heard bilaterally in the posterior aspects, which was significantly concerning for pulmonary edema in the setting of PEC, sepsis, and recent transfusion. Urgent CXR and echo were ordered. Decision was made to consult ICU given deterioration in vital signs. Per MICU patient was given another 1g TXA IVPB and was given Zosyn instead of ceftriaxone. Transfer order was placed.      Gen: Well-appearing. NAD.  Resp: Breathing comfortably on RA, but desatting on RA to 90-92. Bilateral crackles posteriorly.  Abd: Soft, no epigastric or RUQ tenderness. Fundal tenderness to palpation. Trace bleeding around NIKKI device.  : Maki to gravity draining clear urine  Ext: Bilateral patellar reflexes 1+      Vital Signs Last 24 Hrs  T(C): 37.1 (2023 04:30), Max: 38.2 (2023 22:45)  T(F): 98.8 (2023 04:30), Max: 100.8 (2023 22:45)  HR: 93 (2023 04:30) (86 - 137)  BP: 139/79 (2023 04:30) (93/61 - 139/79)  BP(mean): --  RR: 17 (2023 04:30) (16 - 17)  SpO2: 97% (2023 04:30) (97% - 99%)    Parameters below as of 2023 04:30  Patient On (Oxygen Delivery Method): room air              Labs                          9.7    20.28 )-----------( 145      ( 2023 22:49 )             31.3     06-07    134<L>  |  104  |  9   ----------------------------<  76  3.8   |  16<L>  |  1.25    Ca    8.7      2023 21:00    TPro  5.6<L>  /  Alb  3.1<L>  /  TBili  1.1  /  DBili  x   /  AST  20  /  ALT  12  /  AlkPhos  244<H>      PT/INR - ( 2023 21:20 )   PT: 11.0 sec;   INR: 0.93          PTT - ( 2023 22:49 )  PTT:27.9 sec        23 @ 07:01  -  23 @ 07:00  --------------------------------------------------------  IN: 2000 mL / OUT: 150 mL / NET: 1850 mL    23 @ 07:01  -  23 @ 05:39  --------------------------------------------------------  IN: 2445 mL / OUT: 2220 mL / NET: 225 mL       TRANSFER NOTE / MAGNESIUM CHECK NOTE    Patient is a 32y y.o. F now PPD #1  s/p  c/b sepsis 2/2 chorioamnionitis, preeclampsia (PEC) with severe features, and postpartum hemorrhage (PPH) with EBL 1300.    History:  Patient was an induction of labor for PEC, initially without severe features that was diagnosed in the 3rd trimester, and asymptomatic. GBS culture was negative.    Ob hx:  therapeutic  D&C.  therapeutic  medical.  Gyn hx: Denies  PMH: Denies  Meds: ASA 81 mg, prenatal vitamins, IV iron transfusions  PSH:  L breast lumpectomy, benign  ALL: NKDA      Intrapartum Course:  Chorio:  During the patient's labor course she developed a fever and was diagnosed with chorioamnionitis. T_first was 102.4 6/7 @1515, T_max was 103.3 1635. Blood cultures were drawn. IV ampicillin 6g and gentamicin 5mg/kg (340 mg) were started and IV tylenol was given. Despite these measures, the patient remained febrile. Postpartum, she was switched to ancef 2g from amp and clinda 900 was added. She eventually defervesced about 1 hour postpartum. T_last 100.8 6/7 @2245.    PPH:  The patient's delivery was c/b PPH 2/2 atony. She was given 40U pitocin (double pit), TXA 1g IVPB, IM hemabate (carboprost) 250 mcg, cytotec (misoprostol) 1000mcg [place]. An intrauterine bimanual massage was performed and no retained products were noted, but tone remained poor so a NIKKI device was placed to continuous wall suction (80mmHg) and the balloon was inflated to 80cc. Bleeding was subsequently controlled. A L sulcal and L labial laceration were repaired with 2-0 Vicryl suture with excellent hemostasis. Patient was tachy to 135 during the course of events, but BPs were stable. Total EBL 1200cc. Given starting Hgb 9.7, decision was made to transfuse 2u pRBCs    PEC w/ SF:  The patient met criteria for severe features of PEC after delivery with a Cr 1.25 that resulted postpartum. IV Mg was started for seizure ppx @2315. At that time, she was asymptomatic and BPs were well-controlled without medications.      Postpartum course:  Patient remained clinically stable on abx and Mag with NIKKI in place. Attempt was made to deflate NIKKI after 4 hours but tone was lost and brisk bleeding followed, so the device was reattached to suction. Blood cultures showed growth of GNRs so ID (Luke) was consulted - recommended switching from ancef to ceftriaxone, which was ordered. Patient evaluated at bedside to discuss plan for abx and sepsis diagnosis and for clinical magnesium check. Patient was initially asymptomatic but was found to be desatting on RA to 90-92%. On auscultation, crackles were heard bilaterally in the posterior aspects, which was significantly concerning for pulmonary edema in the setting of PEC, sepsis, and recent transfusion. Urgent CXR and echo were ordered. Decision was made to consult ICU given deterioration in vital signs. Per MICU patient was given another 1g TXA IVPB and was given Zosyn instead of ceftriaxone. Transfer order was placed. Before leaving, patient subsequently complained of scotoma that lasted about 1 minute and then resolved.      Gen: Well-appearing. NAD.  Resp: Breathing comfortably on RA, but desatting on RA to 90-92. Bilateral crackles posteriorly.  Abd: Soft, no epigastric or RUQ tenderness. Fundal tenderness to palpation. Trace bleeding around NIKKI device.  : Maki to gravity draining clear urine  Ext: Bilateral patellar reflexes 1+      Vital Signs Last 24 Hrs  T(C): 37.1 (2023 04:30), Max: 38.2 (2023 22:45)  T(F): 98.8 (2023 04:30), Max: 100.8 (2023 22:45)  HR: 93 (2023 04:30) (86 - 137)  BP: 139/79 (2023 04:30) (93/61 - 139/79)  BP(mean): --  RR: 17 (2023 04:30) (16 - 17)  SpO2: 97% (2023 04:30) (97% - 99%)    Parameters below as of 2023 04:30  Patient On (Oxygen Delivery Method): room air              Labs                          9.7    20.28 )-----------( 145      ( 2023 22:49 )             31.3     06-07    134<L>  |  104  |  9   ----------------------------<  76  3.8   |  16<L>  |  1.25    Ca    8.7      2023 21:00    TPro  5.6<L>  /  Alb  3.1<L>  /  TBili  1.1  /  DBili  x   /  AST  20  /  ALT  12  /  AlkPhos  244<H>      PT/INR - ( 2023 21:20 )   PT: 11.0 sec;   INR: 0.93          PTT - ( 2023 22:49 )  PTT:27.9 sec        23 @ 07:  -  23 @ 07:00  --------------------------------------------------------  IN: 2000 mL / OUT: 150 mL / NET: 1850 mL    23 @ 07:01  -  23 @ 05:39  --------------------------------------------------------  IN: 2445 mL / OUT: 2220 mL / NET: 225 mL

## 2023-06-08 NOTE — CHART NOTE - NSCHARTNOTEFT_GEN_A_CORE
Patient assessed at bedside for removal of NIKKI. Initially, cytotec 1000 AL given prophylactically. Suction turned off and balloon deflated slowly. After 30 mins, no active bleeding noted around NIKKI device. Uterine fundus remained firm, midline, at umbilicus before and after NIKKI removal. Patient reported feeling hot with chills, with general malaise.     PE:  Gen: Shivering, well perfused, no acute distress  Abd: Uterine fundus midline and firm. Soft, appropriately tender given multiple bimanual exams s/p   Pulm: Comfortable on 2L NC  Perineum: Minimal blood on pad, no active bleeding with fundal pressure    Vital Signs Last 24 Hrs  T(C): 36.8 (2023 14:34), Max: 38.2 (2023 22:45)  T(F): 98.3 (2023 14:34), Max: 100.8 (2023 22:45)  HR: 109 (2023 16:00) (82 - 137)  BP: 176/88 (2023 16:00) (93/61 - 176/88), persistently severe BP for >1 hour  BP(mean): 121 (2023 16:00) (97 - 121)  RR: 26 (2023 16:00) (16 - 26)  SpO2: 95% (2023 16:00) (92% - 100%)    Parameters below as of 2023 16:00  Patient On (Oxygen Delivery Method): nasal cannula  O2 Flow (L/min): 2    AP  31 yo  s/p  at 38w5d c/b sepsis likely 2/2 chorioamnionitis, CARYN, PEC w/ severe features, and PPH (total EBL 1200).   - PPH: s/p 2u pRBC, double pitocin, 2 doses of TXA, hemabate, 2 doses of cytotec 1000 POR. NIKKI now d/c'd with appropriate postpartum uterine tone and normal postpartum bleeding. Plan to continue to monitor fundal firmness and bleeding closely  - Sepsis: Likely secondary to chorioamnionitis, blood cultures positive for E Coli. At this time, patient afebrile, on Zosyn 3.375 q4h as per ID consult. S/p ampicillin, ancef. F/u serial blood cultures.   - CARYN: Most recent Cr 1.23. Plan to f/u PM CMP. CARYN secondary to PEC w/ SF vs. sepsis.  - PEC w/ SF: Persistently severe range BPs. Initially, magnesium hels Patient assessed at bedside for removal of NIKKI. Initially, cytotec 1000 PA given prophylactically. Suction turned off and balloon deflated slowly. After 30 mins, no active bleeding noted around NIKKI device. Uterine fundus remained firm, midline, at umbilicus before and after NIKKI removal. Patient reported feeling hot with chills, with general malaise.     PE:  Gen: Shivering, well perfused, no acute distress  Abd: Uterine fundus midline and firm. Soft, appropriately tender given multiple bimanual exams s/p   Pulm: Comfortable on 2L NC  Perineum: Minimal blood on pad, no active bleeding with fundal pressure    Vital Signs Last 24 Hrs  T(C): 36.8 (2023 14:34), Max: 38.2 (2023 22:45)  T(F): 98.3 (2023 14:34), Max: 100.8 (2023 22:45)  HR: 109 (2023 16:00) (82 - 137)  BP: 176/88 (2023 16:00) (93/61 - 176/88), persistently severe BP for >1 hour  BP(mean): 121 (2023 16:00) (97 - 121)  RR: 26 (2023 16:00) (16 - 26)  SpO2: 95% (2023 16:00) (92% - 100%)    Parameters below as of 2023 16:00  Patient On (Oxygen Delivery Method): nasal cannula  O2 Flow (L/min): 2    AP  33 yo  s/p  at 38w5d c/b sepsis likely 2/2 chorioamnionitis, CARYN, PEC w/ severe features, and PPH (total EBL 1200).   - PPH: s/p 2u pRBC, double pitocin, 2 doses of TXA, hemabate, 2 doses of cytotec 1000 POR. NIKKI now d/c'd with appropriate postpartum uterine tone and normal postpartum bleeding. Plan to continue to monitor fundal firmness and bleeding closely  - Sepsis: Likely secondary to chorioamnionitis, blood cultures positive for E Coli. At this time, patient afebrile, on Zosyn 3.375 q4h as per ID consult. S/p ampicillin, ancef. F/u serial blood cultures.   - CARYN: Most recent Cr 1.23. Plan to f/u PM CMP. CARYN secondary to PEC w/ SF vs. sepsis.  - PEC w/ SF: Persistently severe range BPs. At this time, holding magnesium therapy given concern for magnesium toxicity in the setting of CARYN and worsening uterine atony. However, given persistently severe range BPs for the last >1 hour with tachycardia, plan to start labetalol 200 TID with hold parameters. Plan to follow up CBC/CMP, if consistent with worsening preeclampsia, will consider re-starting preeclampsia. At this time, no toxic symptoms.     Charlie Cole PGY1 discussed with Radha Tam PGY4 and Dr. Gutiérrez ([facility] attending) Patient assessed at bedside for removal of NIKKI. Initially, cytotec 1000 [place] given prophylactically. Suction turned off and balloon deflated slowly. After 30 mins, no active bleeding noted around NIKKI device. Uterine fundus remained firm, midline, at umbilicus before and after NIKKI removal. Patient reported feeling hot with chills, with general malaise.     PE:  Gen: Shivering, well perfused, no acute distress  Abd: Uterine fundus midline and firm. Soft, appropriately tender given multiple bimanual exams s/p   Pulm: Comfortable on 2L NC  Perineum: Minimal blood on pad, no active bleeding with fundal pressure    Vital Signs Last 24 Hrs  T(C): 36.8 (2023 14:34), Max: 38.2 (2023 22:45)  T(F): 98.3 (2023 14:34), Max: 100.8 (2023 22:45)  HR: 109 (2023 16:00) (82 - 137)  BP: 176/88 (2023 16:00) (93/61 - 176/88), persistently severe BP for >1 hour  BP(mean): 121 (2023 16:00) (97 - 121)  RR: 26 (2023 16:00) (16 - 26)  SpO2: 95% (2023 16:00) (92% - 100%)    Parameters below as of 2023 16:00  Patient On (Oxygen Delivery Method): nasal cannula  O2 Flow (L/min): 2    AP  31 yo  s/p  at 38w5d c/b sepsis likely 2/2 chorioamnionitis, CARYN, PEC w/ severe features, and PPH (total EBL 1200).   - PPH: s/p 2u pRBC, double pitocin, 2 doses of TXA, hemabate, 2 doses of cytotec 1000 POR. NIKKI now d/c'd with appropriate postpartum uterine tone and normal postpartum bleeding. Plan to continue to monitor fundal firmness and bleeding closely  - Sepsis: Likely secondary to chorioamnionitis, blood cultures positive for E Coli. At this time, patient afebrile, on Zosyn 3.375 q4h as per ID consult. S/p ampicillin, ancef. F/u serial blood cultures.   - CARYN: Most recent Cr 1.23. Plan to f/u PM CMP. CARYN secondary to PEC w/ SF vs. sepsis.  - PEC w/ SF: Persistently severe range BPs. At this time, holding magnesium therapy given concern for magnesium toxicity in the setting of CARYN and worsening uterine atony. However, given persistently severe range BPs for the last >1 hour with tachycardia, plan to start labetalol 200 TID with hold parameters. Plan to follow up CBC/CMP, if consistent with worsening preeclampsia, will consider re-starting IV Magnesium drip. At this time, no toxic symptoms.   - Please call extension [phone], and call OB STAT to this patient's room overhead for any obstetrical emergencies.    Charlie Cole PGY1 discussed with Radha Tam PGY4 and Dr. Gutiérrez (M attending)

## 2023-06-08 NOTE — CONSULT NOTE ADULT - SUBJECTIVE AND OBJECTIVE BOX
Maternal Fetal Medicine Consult     Ms. Qureshi is a 32y yo PPD#1 s/p  c/b sepsis 2/2 chorioamnionitis and postpartum hemorrhage (PPH) with EBL 1300. The patient was transferred to the MICU due to sepsis (blood culture GNR) and hypoxia/pulmonary edema.     During the patient's labor course she developed a fever and was diagnosed with chorioamnionitis. T_first was 102.4 6/7 @1515, T_max was 103.3 1635. Blood cultures were drawn. IV ampicillin 6g and gentamicin 5mg/kg (340 mg) were started and IV tylenol was given. Despite these measures, the patient remained febrile. Postpartum, she was switched to ancef 2g from amp and clinda 900 was added. She eventually defervesced about 1 hour postpartum.     The patient's delivery was c/b PPH 2/2 atony. She was given 40U pitocin (double pit), TXA 1g IVPB, IM hemabate (carboprost) 250 mcg, cytotec (misoprostol) 1000mcg NV. An intrauterine bimanual massage was performed and no retained products were noted, but tone remained poor so a NIKKI device was placed to continuous wall suction (80mmHg) and the balloon was inflated to 80cc. Bleeding was subsequently controlled. A L sulcal and L labial laceration were repaired with 2-0 Vicryl suture with excellent hemostasis. Total EBL 1200cc. Given starting Hgb 9.7, decision was made to transfuse 2u pRBCs. Attempt was made to deflate NIKKI after 4 hours but tone was lost and brisk bleeding followed, so the device was reattached to suction.    At the time of her delivery, she was also presumed to have pre-eclampsia with severe features due to an elevation in her Cr (1.25).  IV Mg was started for seizure ppx @2315. At that time, she was asymptomatic and BPs were well-controlled without medications.    Ob hx:  therapeutic  D&C.  therapeutic  medical.  Gyn hx: Denies  PMH: Denies  Meds: ASA 81 mg, prenatal vitamins, IV iron transfusions  PSH: 2009 L breast lumpectomy, benign  ALL: NKDA    Recommendations:  From an OB perspective, although the patient has evidence of an CARYN, the remainder of her clinical picture is not diagnostic of pre-eclampsia with severe features. Her pulmonary edema, subsequent hypoxia, and abnormal Cr are likely more reflective of her underlying diagnosis of sepsis and volume status. Due to the renal excretion of magnesium sulfate and her worsening Cr, recommend discontinuation of the MgSO4 due to the risk of worsening her renal function. BP goals of systolic <160 and diastolic <110. In the event that the patient has additional features consistent with severe pre-eclampsia, alternative medications can be used for seizure prophylaxis. Plan to attempt to remove the NIKKI today - in the event that the patient has continued uterine atony, consider scheduled cytotec.   Agree with plan of care for diuresis. Appreciate care with MICU and ID for sepsis - agree with plan of care to avoid nephrotoxic antibiotics.

## 2023-06-08 NOTE — CHART NOTE - NSCHARTNOTEFT_GEN_A_CORE
Patient seen at bedside. 1000mg cytotec placed UT prior to deflating NIKKI. Patient tolerated well. Minimal vaginal bleeding at this time. Will plan to deflate NIKKI in 15-30min.

## 2023-06-08 NOTE — CONSULT NOTE ADULT - SUBJECTIVE AND OBJECTIVE BOX
INFECTIOUS DISEASES INITIAL CONSULT NOTE    HPI:  Ms. Qureshi is a 33yo  woman w/ hx HTN initially admitted to OB L&D on  for induction of labor s/p vaginal delivery  c/b preeclampsia w/ severe features and sepsis in setting of chorioamnionitis and GNR bacteremia 2/2 E.coli. Patient's pre-delivery hospital course, notable for being treated for chorioamnionitis w/ IV abx gentamicin, clindamycin, and ampicillin and received approximately total 5L IVF cumulative since admission. Patient underwent vaginal delivery of full-term fetus at approx 10PM on  w/ post-delivery complication of heavy vaginal bleeding managed w/ TXA, 2u pRBC transfusion, and placement of intrauterine device connected to suction. Noted also to be having fevers up to 101-102F prior to delivery w/ blood cultures growing out E.coli and recommended by consulting ID team to initiate on IV CTX. Following pRBC transfusion post-delivery, patient reported mild to moderate symptoms of shortness of breath, noted for SpO2 92-94% on 2L NC, systolic BP stable b/t 150-160s, bibasilar crackles on auscultation. ICU team consulted for concern of acute hypoxic respiratory failure in setting of suspected acute pulmonary edema. She otherwise denies recent or active wheezing, chest pain, palpitations, extremity pain or swelling.      PAST MEDICAL & SURGICAL HISTORY:        Review of Systems:   Constitutional, eyes, ENT, cardiovascular, respiratory, gastrointestinal, genitourinary, integumentary, neurological, psychiatric and heme/lymph are otherwise negative other than noted above       ANTIBIOTICS:  MEDICATIONS  (STANDING):  acetaminophen     Tablet .. 975 milliGRAM(s) Oral <User Schedule>  clindamycin IVPB 900 milliGRAM(s) IV Intermittent every 8 hours  clindamycin IVPB      diphtheria/tetanus/pertussis (acellular) Vaccine (Adacel) 0.5 milliLiter(s) IntraMuscular once  gentamicin   IVPB 340 milliGRAM(s) IV Intermittent every 24 hours  ibuprofen  Tablet. 600 milliGRAM(s) Oral every 6 hours  magnesium sulfate Infusion 2 Gm/Hr (50 mL/Hr) IV Continuous <Continuous>  piperacillin/tazobactam IVPB.- 3.375 Gram(s) IV Intermittent once  piperacillin/tazobactam IVPB.- 3.375 Gram(s) IV Intermittent once  piperacillin/tazobactam IVPB.. 3.375 Gram(s) IV Intermittent every 8 hours  prenatal multivitamin 1 Tablet(s) Oral daily  sodium chloride 0.9% lock flush 3 milliLiter(s) IV Push every 8 hours    MEDICATIONS  (PRN):  benzocaine 20%/menthol 0.5% Spray 1 Spray(s) Topical every 6 hours PRN for Perineal discomfort  dibucaine 1% Ointment 1 Application(s) Topical every 6 hours PRN Perineal discomfort  diphenhydrAMINE 25 milliGRAM(s) Oral every 6 hours PRN Pruritus  hydrocortisone 1% Cream 1 Application(s) Topical every 6 hours PRN Moderate Pain (4-6)  lanolin Ointment 1 Application(s) Topical every 6 hours PRN nipple soreness  magnesium hydroxide Suspension 30 milliLiter(s) Oral two times a day PRN Constipation  oxyCODONE    IR 5 milliGRAM(s) Oral once PRN Moderate to Severe Pain (4-10)  oxyCODONE    IR 5 milliGRAM(s) Oral every 3 hours PRN Moderate to Severe Pain (4-10)  pramoxine 1%/zinc 5% Cream 1 Application(s) Topical every 4 hours PRN Moderate Pain (4-6)  simethicone 80 milliGRAM(s) Chew every 4 hours PRN Gas  witch hazel Pads 1 Application(s) Topical every 4 hours PRN Perineal discomfort      Allergies    No Known Allergies    Intolerances        SOCIAL HISTORY:    FAMILY HISTORY:   no FH leading to current infection    Vital Signs Last 24 Hrs  T(C): 37.2 (2023 07:00), Max: 38.2 (2023 22:45)  T(F): 99 (2023 07:00), Max: 100.8 (2023 22:45)  HR: 90 (2023 07:00) (86 - 137)  BP: 136/83 (2023 07:00) (93/61 - 139/79)  BP(mean): --  RR: 16 (2023 07:00) (16 - 17)  SpO2: 95% (2023 07:00) (92% - 99%)    Parameters below as of 2023 07:00  Patient On (Oxygen Delivery Method): nasal cannula  O2 Flow (L/min): 4      23 @ 07:01  -  23 @ 07:00  --------------------------------------------------------  IN: 2545 mL / OUT: 2610 mL / NET: -65 mL        PHYSICAL EXAM:  General: NAD  HEENT: head NC/AT, no conjunctival injection, EOMI, MMM  Neck: supple, no JVD  Cardio: RRR, +S1/S2, no M/R/G  Resp: lungs CTAB, no cough/wheezes/rales/rhonchi, on 2L NC   Abdo: soft, NT, ND, +BS, no organomegaly or palpable mass    Extremities: WWP, 1+ non-pitting edema below the knees b/l  Vasc: 2+ radial and DP pulses b/l  Neuro: A&Ox3        LABS:                        9.4    25.71 )-----------( 108      ( 2023 05:35 )             28.6     06-08    132<L>  |  105  |  9   ----------------------------<  109<H>  4.1   |  16<L>  |  1.23    Ca    8.4      2023 05:35  Phos  4.0     06-08  Mg     3.5     06-08    TPro  4.6<L>  /  Alb  2.4<L>  /  TBili  1.2  /  DBili  x   /  AST  26  /  ALT  10  /  AlkPhos  171<H>  06-08    PT/INR - ( 2023 05:35 )   PT: 11.1 sec;   INR: 0.93          PTT - ( 2023 05:35 )  PTT:29.4 sec      MICROBIOLOGY:    RADIOLOGY & ADDITIONAL STUDIES:   INFECTIOUS DISEASES INITIAL CONSULT NOTE    HPI:  Ms. Qureshi is a 33yo  woman w/ hx HTN initially admitted to OB L&D on  for induction of labor s/p vaginal delivery  c/b preeclampsia w/ severe features and sepsis in setting of chorioamnionitis and GNR bacteremia 2/2 E.coli. Patient's pre-delivery hospital course, notable for being treated for chorioamnionitis w/ IV abx gentamicin, clindamycin, and ampicillin and received approximately total 5L IVF cumulative since admission. Patient underwent vaginal delivery of full-term fetus at approx 10PM on  w/ post-delivery complication of heavy vaginal bleeding managed w/ TXA, 2u pRBC transfusion, and placement of intrauterine device connected to suction. Noted also to be having fevers up to 101-102F prior to delivery w/ blood cultures growing out E.coli and recommended by consulting ID team to initiate on IV CTX. Following pRBC transfusion post-delivery, patient reported mild to moderate symptoms of shortness of breath, noted for SpO2 92-94% on 2L NC, systolic BP stable b/t 150-160s, bibasilar crackles on auscultation. ICU team consulted for concern of acute hypoxic respiratory failure in setting of suspected acute pulmonary edema. She otherwise denies recent or active wheezing, chest pain, palpitations, extremity pain or swelling.      PAST MEDICAL & SURGICAL HISTORY:        Review of Systems:   Constitutional, eyes, ENT, cardiovascular, respiratory, gastrointestinal, genitourinary, integumentary, neurological, psychiatric and heme/lymph are otherwise negative other than noted above       ANTIBIOTICS:  MEDICATIONS  (STANDING):  acetaminophen     Tablet .. 975 milliGRAM(s) Oral <User Schedule>  clindamycin IVPB 900 milliGRAM(s) IV Intermittent every 8 hours  clindamycin IVPB      diphtheria/tetanus/pertussis (acellular) Vaccine (Adacel) 0.5 milliLiter(s) IntraMuscular once  gentamicin   IVPB 340 milliGRAM(s) IV Intermittent every 24 hours  ibuprofen  Tablet. 600 milliGRAM(s) Oral every 6 hours  magnesium sulfate Infusion 2 Gm/Hr (50 mL/Hr) IV Continuous <Continuous>  piperacillin/tazobactam IVPB.- 3.375 Gram(s) IV Intermittent once  piperacillin/tazobactam IVPB.- 3.375 Gram(s) IV Intermittent once  piperacillin/tazobactam IVPB.. 3.375 Gram(s) IV Intermittent every 8 hours  prenatal multivitamin 1 Tablet(s) Oral daily  sodium chloride 0.9% lock flush 3 milliLiter(s) IV Push every 8 hours    MEDICATIONS  (PRN):  benzocaine 20%/menthol 0.5% Spray 1 Spray(s) Topical every 6 hours PRN for Perineal discomfort  dibucaine 1% Ointment 1 Application(s) Topical every 6 hours PRN Perineal discomfort  diphenhydrAMINE 25 milliGRAM(s) Oral every 6 hours PRN Pruritus  hydrocortisone 1% Cream 1 Application(s) Topical every 6 hours PRN Moderate Pain (4-6)  lanolin Ointment 1 Application(s) Topical every 6 hours PRN nipple soreness  magnesium hydroxide Suspension 30 milliLiter(s) Oral two times a day PRN Constipation  oxyCODONE    IR 5 milliGRAM(s) Oral once PRN Moderate to Severe Pain (4-10)  oxyCODONE    IR 5 milliGRAM(s) Oral every 3 hours PRN Moderate to Severe Pain (4-10)  pramoxine 1%/zinc 5% Cream 1 Application(s) Topical every 4 hours PRN Moderate Pain (4-6)  simethicone 80 milliGRAM(s) Chew every 4 hours PRN Gas  witch hazel Pads 1 Application(s) Topical every 4 hours PRN Perineal discomfort      Allergies    No Known Allergies    Intolerances        SOCIAL HISTORY:  Lives with domestic partner, is an RN at [employer].  No tobacco, occ alcohol prior to pregnancy, no recreational drug use.    FAMILY HISTORY:   no FH leading to current infection    Vital Signs Last 24 Hrs  T(C): 37.2 (2023 07:00), Max: 38.2 (2023 22:45)  T(F): 99 (2023 07:00), Max: 100.8 (2023 22:45)  HR: 90 (2023 07:00) (86 - 137)  BP: 136/83 (2023 07:00) (93/61 - 139/79)  BP(mean): --  RR: 16 (2023 07:00) (16 - 17)  SpO2: 95% (2023 07:00) (92% - 99%)    Parameters below as of 2023 07:00  Patient On (Oxygen Delivery Method): nasal cannula  O2 Flow (L/min): 4      23 @ 07:01  -  23 @ 07:00  --------------------------------------------------------  IN: 2545 mL / OUT: 2610 mL / NET: -65 mL        PHYSICAL EXAM:  General: NAD  HEENT: head NC/AT, no conjunctival injection, EOMI, MMM  Neck: supple, no JVD  Cardio: RRR, +S1/S2, no M/R/G  Resp: lungs CTAB, no cough/wheezes/rales/rhonchi, on 2L NC   Abdo: soft, NT, ND, +BS, no organomegaly or palpable mass    Extremities: WWP, 1+ non-pitting edema below the knees b/l  Vasc: 2+ radial and DP pulses b/l  Neuro: A&Ox3        LABS:                        9.4    25.71 )-----------( 108      ( 2023 05:35 )             28.6     06-08    132<L>  |  105  |  9   ----------------------------<  109<H>  4.1   |  16<L>  |  1.23    Ca    8.4      2023 05:35  Phos  4.0     06-08  Mg     3.5     06-08    TPro  4.6<L>  /  Alb  2.4<L>  /  TBili  1.2  /  DBili  x   /  AST  26  /  ALT  10  /  AlkPhos  171<H>  06-08    PT/INR - ( 2023 05:35 )   PT: 11.1 sec;   INR: 0.93          PTT - ( 2023 05:35 )  PTT:29.4 sec      MICROBIOLOGY:    RADIOLOGY & ADDITIONAL STUDIES:

## 2023-06-08 NOTE — PROGRESS NOTE ADULT - SUBJECTIVE AND OBJECTIVE BOX
Hospital courseL Ms. Qureshi is a 33yo  woman w/ hx HTN initially admitted to OB L&D on  for induction of labor s/p vaginal delivery  c/b preeclampsia w/ severe features and sepsis in setting of chorioamnionitis and GNR bacteremia 2/2 E.coli. Patient's pre-delivery hospital course, notable for being treated for chorioamnionitis w/ IV abx gentamicin, clindamycin, and ampicillin and received approximately total 5L IVF cumulative since admission. Patient underwent vaginal delivery of full-term fetus at approx 10PM on  w/ post-delivery complication of heavy vaginal bleeding managed w/ TXA, 2u pRBC transfusion, and placement of intrauterine device connected to suction. Noted also to be having fevers up to 101-102F prior to delivery w/ blood cultures growing out E.coli and recommended by consulting ID team to initiate on IV CTX. Following pRBC transfusion post-delivery, patient reported mild to moderate symptoms of shortness of breath, noted for SpO2 92-94% on 2L NC, systolic BP stable b/t 150-160s, bibasilar crackles on auscultation. ICU team consulted for concern of acute hypoxic respiratory failure in setting of suspected acute pulmonary edema. She otherwise denies recent or active wheezing, chest pain, palpitations, extremity pain or swelling. Patient b/l B lines on POCUS, IV lasix 40mg x1 given. Discontinued gentamycin and clindamycin, continued zosyn. surveillance bcx sent.     SUBJECTIVE: Patient seen and examined at bedside. No acute complaints this morning.  denies recent or active wheezing, chest pain, palpitations, extremity pain or swelling.     VITAL SIGNS:  ICU Vital Signs Last 24 Hrs  T(C): 36.8 (2023 08:47), Max: 38.2 (2023 22:45)  T(F): 98.3 (2023 08:47), Max: 100.8 (2023 22:45)  HR: 85 (2023 13:00) (83 - 137)  BP: 146/83 (2023 13:00) (93/61 - 154/97)  BP(mean): 106 (2023 13:00) (106 - 120)  ABP: --  ABP(mean): --  RR: 21 (2023 13:00) (16 - 25)  SpO2: 99% (2023 13:00) (92% - 100%)    O2 Parameters below as of 2023 13:00  Patient On (Oxygen Delivery Method): nasal cannula  O2 Flow (L/min): 4             @ 07:  -   @ 07:00  --------------------------------------------------------  IN: 2545 mL / OUT: 2610 mL / NET: -65 mL     @ 07:01   @ 13:27  --------------------------------------------------------  IN: 200 mL / OUT: 1955 mL / NET: -1755 mL      CAPILLARY BLOOD GLUCOSE          PHYSICAL EXAM:    General: NAD, laying in bed, speaking in full sentences  HEENT: head NC/AT, no conjunctival injection, EOMI, MMM  Neck: supple, no JVD  Cardio: RRR, +S1/S2, no M/R/G  Resp: lungs CTAB, no cough/wheezes/rales/rhonchi  Abdo: soft, NT, ND, +BS, no organomegaly or palpable mass    Extremities: WWP, 1+ non-pitting edema below the knees b/l  Vasc: 2+ radial and DP pulses b/l  Neuro: A&Ox3  Psych: speech non-pressured, thoughts goal-oriented  Skin: dry, intact, no visible jaundice   MSK: no joint swelling    MEDICATIONS:  MEDICATIONS  (STANDING):  acetaminophen     Tablet .. 975 milliGRAM(s) Oral <User Schedule>  diphtheria/tetanus/pertussis (acellular) Vaccine (Adacel) 0.5 milliLiter(s) IntraMuscular once  ibuprofen  Tablet. 600 milliGRAM(s) Oral every 6 hours  piperacillin/tazobactam IVPB.- 3.375 Gram(s) IV Intermittent once  piperacillin/tazobactam IVPB.. 3.375 Gram(s) IV Intermittent every 8 hours  prenatal multivitamin 1 Tablet(s) Oral daily  sodium chloride 0.9% lock flush 3 milliLiter(s) IV Push every 8 hours    MEDICATIONS  (PRN):  benzocaine 20%/menthol 0.5% Spray 1 Spray(s) Topical every 6 hours PRN for Perineal discomfort  dibucaine 1% Ointment 1 Application(s) Topical every 6 hours PRN Perineal discomfort  diphenhydrAMINE 25 milliGRAM(s) Oral every 6 hours PRN Pruritus  hydrocortisone 1% Cream 1 Application(s) Topical every 6 hours PRN Moderate Pain (4-6)  lanolin Ointment 1 Application(s) Topical every 6 hours PRN nipple soreness  magnesium hydroxide Suspension 30 milliLiter(s) Oral two times a day PRN Constipation  oxyCODONE    IR 5 milliGRAM(s) Oral every 3 hours PRN Moderate to Severe Pain (4-10)  oxyCODONE    IR 5 milliGRAM(s) Oral once PRN Moderate to Severe Pain (4-10)  pramoxine 1%/zinc 5% Cream 1 Application(s) Topical every 4 hours PRN Moderate Pain (4-6)  simethicone 80 milliGRAM(s) Chew every 4 hours PRN Gas  witch hazel Pads 1 Application(s) Topical every 4 hours PRN Perineal discomfort      ALLERGIES:  Allergies    No Known Allergies    Intolerances        LABS:                        9.4    25.71 )-----------( 108      ( 2023 05:35 )             28.6     06-08    132<L>  |  105  |  9   ----------------------------<  109<H>  4.1   |  16<L>  |  1.23    Ca    8.4      2023 05:35  Phos  4.0     06-08  Mg     3.5     06-08    TPro  4.6<L>  /  Alb  2.4<L>  /  TBili  1.2  /  DBili  x   /  AST  26  /  ALT  10  /  AlkPhos  171<H>  06-08    PT/INR - ( 2023 05:35 )   PT: 11.1 sec;   INR: 0.93          PTT - ( 2023 05:35 )  PTT:29.4 sec      RADIOLOGY & ADDITIONAL TESTS: Reviewed.

## 2023-06-08 NOTE — PROGRESS NOTE ADULT - SUBJECTIVE AND OBJECTIVE BOX
SHIFT CHANGE NOTE    Patient assessed at bedside after change of shift. She appears well. Denies headache, dizziness, visual disturbances, nausea/vomiting, RUQ pain, epigastric pain, and SOB. BPs at bedside 155/80s. Patient spiked another fever of 102.3 @1700 and has been persistently febrile since then, most recent 101.5 @1900. She however denies chills.         ICU Vital Signs Last 24 Hrs  T(C): 38.6 (08 Jun 2023 19:00), Max: 39.1 (08 Jun 2023 17:00)  T(F): 101.5 (08 Jun 2023 19:00), Max: 102.3 (08 Jun 2023 17:00)  HR: 101 (08 Jun 2023 20:00) (82 - 137)  BP: 150/85 (08 Jun 2023 20:00) (93/61 - 176/88)  BP(mean): 109 (08 Jun 2023 20:00) (97 - 121)  ABP: --  ABP(mean): --  RR: 22 (08 Jun 2023 20:00) (16 - 27)  SpO2: 95% (08 Jun 2023 20:00) (92% - 100%)    O2 Parameters below as of 08 Jun 2023 20:00  Patient On (Oxygen Delivery Method): room air        Physical Exam  Gen: Well-appearing. NAD.  Resp: Breathing comfortably on RA. Bilateral crackles posteriorly, but improved from prior.  Abd: Soft, no epigastric or RUQ tenderness. Fundal tenderness to palpation. Minimal bleeding on peripad WNL.  : Maki to gravity draining clear urine  Ext: Bilateral patellar reflexes 1+, bilateral biceps reflexes 2+, 2-beat ankle clonus                            10.2   20.71 )-----------( 114      ( 08 Jun 2023 17:52 )             30.8   06-08    138  |  107  |  10  ----------------------------<  72  4.2   |  21<L>  |  1.41<H>    Ca    8.1<L>      08 Jun 2023 17:52  Phos  4.0     06-08  Mg     3.0     06-08    TPro  4.9<L>  /  Alb  2.5<L>  /  TBili  0.9  /  DBili  x   /  AST  24  /  ALT  9<L>  /  AlkPhos  178<H>  06-08    Fibrinogen Clauss: 452 mg/dL (06.08.23 @ 17:52)    Culture - Blood (06.07.23 @ 16:13)    -  Escherichia coli: Detec   Gram Stain:   Aerobic Bottle: Gram Negative Rods  Result called to and read back byClayton GaviotaRENÉERAYA DENY METZGER  06/08/2023 03:39:36  ***Blood Panel PCR results on this specimen are available  approximately 3 hours after the Gram stain result.***  Gram stain, PCR, and/or culture results may not always  correspond due to difference in methodologies.  ************************************************************  This PCR assay was performed using [employer] panel.  This assay tests for bacterial, fungal and resistance gene  targets.   Specimen Source: .Blood Blood-Venous   Organism: Blood Culture PCR   Organism Identification: Blood Culture PCR   Method Type: PCR          < from: TTE Echo Complete w/o Contrast w/ Doppler (06.08.23 @ 09:45) >  CONCLUSIONS:     1. Normal left ventricular size.   2. Mildly reduced left ventricular systolic function.   3. Grade II left ventricular diastolic dysfunction.   4. Dilated left atrium.   5. Moderate mitral regurgitation.   6. Pulmonary artery systolic pressure is 44 mmHg.   7. Trivial pericardial effusion.   8. No prior echo is available for comparison.        Left Ventricle:  Left ventricular systolic function is mildly reduced with a calculated   ejection fraction of 50-55% with global hypokinesis. Doppler findings are   not conclusive but are suggestive of grade II diastolic dysfunction.    Right Ventricle:  The right ventricle is normal in size. Right ventricular systolic   function is normal. The tricuspid annular plane systolic excursion   (TAPSE) is 21.70 mm (normal >=17 mm).    Left Atrium:  The left atrium is dilated. Left atrial volume index (SABINA) is 42.0 ml/m².    Right Atrium:  The right atrium is normal in size.    Aortic Valve:  The aortic valve is tricuspid with normal structure and function without   stenosis. There is no evidence of aortic regurgitation.    Mitral Valve:  Structurally normal mitral valve with normal leaflet excursion. There is   moderate mitral regurgitation. The etiology of mitral regurgitation   appears to be functional.    Tricuspid Valve:  Structurally normal tricuspid valve with normal leaflet excursion. There   is mild-to-moderate tricuspid regurgitation.    Inferior Vena Cava:  The inferior vena cava is normal in size (<2.1cm) with abnormal   inspiratory collapse (<50%) consistent with mildly elevated right atrial   pressure (  8, range 5-10mmHg).    Pulmonic Valve:  Structurally normal pulmonic valve with normal leaflet excursion. There   is trace pulmonic regurgitation.    Aorta:  The aortic root and proximal ascending aorta are normal in size.    Pericardium:  Trivial pericardial effusion.    < end of copied text >             SHIFT CHANGE NOTE    Patient assessed at bedside after change of shift. She appears well. Denies headache, dizziness, visual disturbances, nausea/vomiting, RUQ pain, epigastric pain, chest pain, palpitations, and SOB. BPs at bedside 155/80s. Patient spiked another fever of 102.3 @1700 and has been persistently febrile since then, most recent 101.5 @1900. She however denies chills.         ICU Vital Signs Last 24 Hrs  T(C): 38.6 (08 Jun 2023 19:00), Max: 39.1 (08 Jun 2023 17:00)  T(F): 101.5 (08 Jun 2023 19:00), Max: 102.3 (08 Jun 2023 17:00)  HR: 101 (08 Jun 2023 20:00) (82 - 137)  BP: 150/85 (08 Jun 2023 20:00) (93/61 - 176/88)  BP(mean): 109 (08 Jun 2023 20:00) (97 - 121)  ABP: --  ABP(mean): --  RR: 22 (08 Jun 2023 20:00) (16 - 27)  SpO2: 95% (08 Jun 2023 20:00) (92% - 100%)    O2 Parameters below as of 08 Jun 2023 20:00  Patient On (Oxygen Delivery Method): room air        Physical Exam  Gen: Well-appearing. NAD.  Resp: Breathing comfortably on RA. Bilateral crackles posteriorly, but improved from prior.  Abd: Soft, no epigastric or RUQ tenderness. Fundal tenderness to palpation. Minimal bleeding on peripad WNL.  : Maki to gravity draining clear urine  Ext: Bilateral patellar reflexes 1+, bilateral biceps reflexes 2+, 2-beat ankle clonus                            10.2   20.71 )-----------( 114      ( 08 Jun 2023 17:52 )             30.8   06-08    138  |  107  |  10  ----------------------------<  72  4.2   |  21<L>  |  1.41<H>    Ca    8.1<L>      08 Jun 2023 17:52  Phos  4.0     06-08  Mg     3.0     06-08    TPro  4.9<L>  /  Alb  2.5<L>  /  TBili  0.9  /  DBili  x   /  AST  24  /  ALT  9<L>  /  AlkPhos  178<H>  06-08    Fibrinogen Clauss: 452 mg/dL (06.08.23 @ 17:52)    Culture - Blood (06.07.23 @ 16:13)    -  Escherichia coli: Detec   Gram Stain:   Aerobic Bottle: Gram Negative Rods  Result called to and read back byDENY BURDICK RN  06/08/2023 03:39:36  ***Blood Panel PCR results on this specimen are available  approximately 3 hours after the Gram stain result.***  Gram stain, PCR, and/or culture results may not always  correspond due to difference in methodologies.  ************************************************************  This PCR assay was performed using [employer] panel.  This assay tests for bacterial, fungal and resistance gene  targets.   Specimen Source: .Blood Blood-Venous   Organism: Blood Culture PCR   Organism Identification: Blood Culture PCR   Method Type: PCR          < from: TTE Echo Complete w/o Contrast w/ Doppler (06.08.23 @ 09:45) >  CONCLUSIONS:     1. Normal left ventricular size.   2. Mildly reduced left ventricular systolic function.   3. Grade II left ventricular diastolic dysfunction.   4. Dilated left atrium.   5. Moderate mitral regurgitation.   6. Pulmonary artery systolic pressure is 44 mmHg.   7. Trivial pericardial effusion.   8. No prior echo is available for comparison.        Left Ventricle:  Left ventricular systolic function is mildly reduced with a calculated   ejection fraction of 50-55% with global hypokinesis. Doppler findings are   not conclusive but are suggestive of grade II diastolic dysfunction.    Right Ventricle:  The right ventricle is normal in size. Right ventricular systolic   function is normal. The tricuspid annular plane systolic excursion   (TAPSE) is 21.70 mm (normal >=17 mm).    Left Atrium:  The left atrium is dilated. Left atrial volume index (SABINA) is 42.0 ml/m².    Right Atrium:  The right atrium is normal in size.    Aortic Valve:  The aortic valve is tricuspid with normal structure and function without   stenosis. There is no evidence of aortic regurgitation.    Mitral Valve:  Structurally normal mitral valve with normal leaflet excursion. There is   moderate mitral regurgitation. The etiology of mitral regurgitation   appears to be functional.    Tricuspid Valve:  Structurally normal tricuspid valve with normal leaflet excursion. There   is mild-to-moderate tricuspid regurgitation.    Inferior Vena Cava:  The inferior vena cava is normal in size (<2.1cm) with abnormal   inspiratory collapse (<50%) consistent with mildly elevated right atrial   pressure (  8, range 5-10mmHg).    Pulmonic Valve:  Structurally normal pulmonic valve with normal leaflet excursion. There   is trace pulmonic regurgitation.    Aorta:  The aortic root and proximal ascending aorta are normal in size.    Pericardium:  Trivial pericardial effusion.    < end of copied text >             SHIFT CHANGE NOTE    Patient assessed at bedside after change of shift. She appears well. Denies headache, dizziness, visual disturbances, nausea/vomiting, RUQ pain, epigastric pain, chest pain, palpitations, and SOB. Patient developed severe range BPs today, requiring a IVP antihypertensives, so she was started on PO antihypertensives standing. BPs at bedside currently 150s/80s. Patient spiked another fever of 102.3 @1700 and has been persistently febrile since then, most recent 101.5 @1900. She however denies chills. Still on Zosyn monotherapy per MICU. Echo today with reduced EF 50-55%, pulmonary HTN, global hypokineses, diastolic dysfunction. No prior echo for comparison, but patient has no cardiac history prior PEC diagnosis in 3rd trimester. She is currently asymptomatic. Patient denies heavy vaginal bleeding s/p NIKKI. She was seen by lactation consult today.        ICU Vital Signs Last 24 Hrs  T(C): 38.6 (08 Jun 2023 19:00), Max: 39.1 (08 Jun 2023 17:00)  T(F): 101.5 (08 Jun 2023 19:00), Max: 102.3 (08 Jun 2023 17:00)  HR: 101 (08 Jun 2023 20:00) (82 - 137)  BP: 150/85 (08 Jun 2023 20:00) (93/61 - 176/88)  BP(mean): 109 (08 Jun 2023 20:00) (97 - 121)  ABP: --  ABP(mean): --  RR: 22 (08 Jun 2023 20:00) (16 - 27)  SpO2: 95% (08 Jun 2023 20:00) (92% - 100%)    O2 Parameters below as of 08 Jun 2023 20:00  Patient On (Oxygen Delivery Method): room air        Physical Exam  Gen: Well-appearing. NAD.  Resp: Breathing comfortably on RA. Bilateral crackles posteriorly, but improved from prior.  Abd: Soft, no epigastric or RUQ tenderness. Fundal tenderness to palpation. Minimal bleeding on peripad WNL.  : Maki to gravity draining clear urine  Ext: Bilateral patellar reflexes 1+, bilateral biceps reflexes 2+, 2-beat ankle clonus                            10.2   20.71 )-----------( 114      ( 08 Jun 2023 17:52 )             30.8   06-08    138  |  107  |  10  ----------------------------<  72  4.2   |  21<L>  |  1.41<H>    Ca    8.1<L>      08 Jun 2023 17:52  Phos  4.0     06-08  Mg     3.0     06-08    TPro  4.9<L>  /  Alb  2.5<L>  /  TBili  0.9  /  DBili  x   /  AST  24  /  ALT  9<L>  /  AlkPhos  178<H>  06-08    Fibrinogen Clauss: 452 mg/dL (06.08.23 @ 17:52)    Culture - Blood (06.07.23 @ 16:13)    -  Escherichia coli: Detec   Gram Stain:   Aerobic Bottle: Gram Negative Rods  Result called to and read back by_ DENY HEAD RN  06/08/2023 03:39:36  ***Blood Panel PCR results on this specimen are available  approximately 3 hours after the Gram stain result.***  Gram stain, PCR, and/or culture results may not always  correspond due to difference in methodologies.  ************************************************************  This PCR assay was performed using [employer]ID2 panel.  This assay tests for bacterial, fungal and resistance gene  targets.   Specimen Source: .Blood Blood-Venous   Organism: Blood Culture PCR   Organism Identification: Blood Culture PCR   Method Type: PCR          < from: TTE Echo Complete w/o Contrast w/ Doppler (06.08.23 @ 09:45) >  CONCLUSIONS:     1. Normal left ventricular size.   2. Mildly reduced left ventricular systolic function.   3. Grade II left ventricular diastolic dysfunction.   4. Dilated left atrium.   5. Moderate mitral regurgitation.   6. Pulmonary artery systolic pressure is 44 mmHg.   7. Trivial pericardial effusion.   8. No prior echo is available for comparison.        Left Ventricle:  Left ventricular systolic function is mildly reduced with a calculated   ejection fraction of 50-55% with global hypokinesis. Doppler findings are   not conclusive but are suggestive of grade II diastolic dysfunction.    Right Ventricle:  The right ventricle is normal in size. Right ventricular systolic   function is normal. The tricuspid annular plane systolic excursion   (TAPSE) is 21.70 mm (normal >=17 mm).    Left Atrium:  The left atrium is dilated. Left atrial volume index (SABINA) is 42.0 ml/m².    Right Atrium:  The right atrium is normal in size.    Aortic Valve:  The aortic valve is tricuspid with normal structure and function without   stenosis. There is no evidence of aortic regurgitation.    Mitral Valve:  Structurally normal mitral valve with normal leaflet excursion. There is   moderate mitral regurgitation. The etiology of mitral regurgitation   appears to be functional.    Tricuspid Valve:  Structurally normal tricuspid valve with normal leaflet excursion. There   is mild-to-moderate tricuspid regurgitation.    Inferior Vena Cava:  The inferior vena cava is normal in size (<2.1cm) with abnormal   inspiratory collapse (<50%) consistent with mildly elevated right atrial   pressure (  8, range 5-10mmHg).    Pulmonic Valve:  Structurally normal pulmonic valve with normal leaflet excursion. There   is trace pulmonic regurgitation.    Aorta:  The aortic root and proximal ascending aorta are normal in size.    Pericardium:  Trivial pericardial effusion.    < end of copied text >             SHIFT CHANGE NOTE    Patient assessed at bedside after change of shift. She appears well. Denies headache, dizziness, visual disturbances, nausea/vomiting, RUQ pain, epigastric pain, chest pain, palpitations, and SOB. She has not had scotoma since last night before her transfer to MICU. Patient developed severe range BPs today, requiring a IVP antihypertensives, so she was started on PO antihypertensives standing. BPs at bedside currently 150s/80s. Patient spiked another fever of 102.3 @1700 and has been persistently febrile since then, most recent 101.5 @1900. She however denies chills. Still on Zosyn monotherapy per MICU. Echo today with reduced EF 50-55%, pulmonary HTN, global hypokineses, diastolic dysfunction. No prior echo for comparison, but patient has no cardiac history prior PEC diagnosis in 3rd trimester. She is currently asymptomatic. Patient denies heavy vaginal bleeding s/p NIKKI. She was seen by lactation consult today.        ICU Vital Signs Last 24 Hrs  T(C): 38.6 (08 Jun 2023 19:00), Max: 39.1 (08 Jun 2023 17:00)  T(F): 101.5 (08 Jun 2023 19:00), Max: 102.3 (08 Jun 2023 17:00)  HR: 101 (08 Jun 2023 20:00) (82 - 137)  BP: 150/85 (08 Jun 2023 20:00) (93/61 - 176/88)  BP(mean): 109 (08 Jun 2023 20:00) (97 - 121)  ABP: --  ABP(mean): --  RR: 22 (08 Jun 2023 20:00) (16 - 27)  SpO2: 95% (08 Jun 2023 20:00) (92% - 100%)    O2 Parameters below as of 08 Jun 2023 20:00  Patient On (Oxygen Delivery Method): room air        Physical Exam  Gen: Well-appearing. NAD.  Resp: Breathing comfortably on RA. Bilateral crackles posteriorly, but improved from prior.  Abd: Soft, no epigastric or RUQ tenderness. Fundal tenderness to palpation. Minimal bleeding on peripad WNL.  : Maki to gravity draining clear urine  Ext: Bilateral patellar reflexes 1+, bilateral biceps reflexes 2+, 2-beat ankle clonus                            10.2   20.71 )-----------( 114      ( 08 Jun 2023 17:52 )             30.8   06-08    138  |  107  |  10  ----------------------------<  72  4.2   |  21<L>  |  1.41<H>    Ca    8.1<L>      08 Jun 2023 17:52  Phos  4.0     06-08  Mg     3.0     06-08    TPro  4.9<L>  /  Alb  2.5<L>  /  TBili  0.9  /  DBili  x   /  AST  24  /  ALT  9<L>  /  AlkPhos  178<H>  06-08    Fibrinogen Clauss: 452 mg/dL (06.08.23 @ 17:52)    Culture - Blood (06.07.23 @ 16:13)    -  Escherichia coli: Detec   Gram Stain:   Aerobic Bottle: Gram Negative Rods  Result called to and read back byDENY BURDICK RN  06/08/2023 03:39:36  ***Blood Panel PCR results on this specimen are available  approximately 3 hours after the Gram stain result.***  Gram stain, PCR, and/or culture results may not always  correspond due to difference in methodologies.  ************************************************************  This PCR assay was performed using [employer] BCID2 panel.  This assay tests for bacterial, fungal and resistance gene  targets.   Specimen Source: .Blood Blood-Venous   Organism: Blood Culture PCR   Organism Identification: Blood Culture PCR   Method Type: PCR          < from: TTE Echo Complete w/o Contrast w/ Doppler (06.08.23 @ 09:45) >  CONCLUSIONS:     1. Normal left ventricular size.   2. Mildly reduced left ventricular systolic function.   3. Grade II left ventricular diastolic dysfunction.   4. Dilated left atrium.   5. Moderate mitral regurgitation.   6. Pulmonary artery systolic pressure is 44 mmHg.   7. Trivial pericardial effusion.   8. No prior echo is available for comparison.        Left Ventricle:  Left ventricular systolic function is mildly reduced with a calculated   ejection fraction of 50-55% with global hypokinesis. Doppler findings are   not conclusive but are suggestive of grade II diastolic dysfunction.    Right Ventricle:  The right ventricle is normal in size. Right ventricular systolic   function is normal. The tricuspid annular plane systolic excursion   (TAPSE) is 21.70 mm (normal >=17 mm).    Left Atrium:  The left atrium is dilated. Left atrial volume index (SABINA) is 42.0 ml/m².    Right Atrium:  The right atrium is normal in size.    Aortic Valve:  The aortic valve is tricuspid with normal structure and function without   stenosis. There is no evidence of aortic regurgitation.    Mitral Valve:  Structurally normal mitral valve with normal leaflet excursion. There is   moderate mitral regurgitation. The etiology of mitral regurgitation   appears to be functional.    Tricuspid Valve:  Structurally normal tricuspid valve with normal leaflet excursion. There   is mild-to-moderate tricuspid regurgitation.    Inferior Vena Cava:  The inferior vena cava is normal in size (<2.1cm) with abnormal   inspiratory collapse (<50%) consistent with mildly elevated right atrial   pressure (  8, range 5-10mmHg).    Pulmonic Valve:  Structurally normal pulmonic valve with normal leaflet excursion. There   is trace pulmonic regurgitation.    Aorta:  The aortic root and proximal ascending aorta are normal in size.    Pericardium:  Trivial pericardial effusion.    < end of copied text >

## 2023-06-08 NOTE — CONSULT NOTE ADULT - ASSESSMENT
Ms. Qureshi is a 33yo  woman w/ hx HTN initially admitted to OB L&D on  for induction of labor s/p vaginal delivery  c/b preeclampsia w/ severe features and sepsis in setting of chorioamnionitis and GNR bacteremia 2/2 E.coli, transferred to MICU for acute hypoxic respiratory failure in setting of acute pulmonary congestion 2/2 receiving large volume IVF resuscitation. Patient clinically stable on exam.     Recommendations:      Ms. Qureshi is a 31yo  woman w/ hx HTN initially admitted to OB L&D on  for induction of labor s/p vaginal delivery  c/b preeclampsia w/ severe features and sepsis in setting of chorioamnionitis and GNR bacteremia 2/2 E.coli, transferred to MICU for acute hypoxic respiratory failure in setting of acute pulmonary congestion 2/2 receiving large volume IVF resuscitation. Patient clinically stable on exam.     Recommendations:   - STOP Gentamycin   - cont Zosyn 3.375G q8hrs   - Send repeat BCx     ID Team 1 will continue to follow

## 2023-06-09 LAB
ALBUMIN SERPL ELPH-MCNC: 2.3 G/DL — LOW (ref 3.3–5)
ALBUMIN SERPL ELPH-MCNC: 2.4 G/DL — LOW (ref 3.3–5)
ALBUMIN SERPL ELPH-MCNC: 2.5 G/DL — LOW (ref 3.3–5)
ALP SERPL-CCNC: 160 U/L — HIGH (ref 40–120)
ALP SERPL-CCNC: 170 U/L — HIGH (ref 40–120)
ALP SERPL-CCNC: 172 U/L — HIGH (ref 40–120)
ALT FLD-CCNC: 7 U/L — LOW (ref 10–45)
ALT FLD-CCNC: 8 U/L — LOW (ref 10–45)
ALT FLD-CCNC: 9 U/L — LOW (ref 10–45)
ANION GAP SERPL CALC-SCNC: 12 MMOL/L — SIGNIFICANT CHANGE UP (ref 5–17)
ANION GAP SERPL CALC-SCNC: 13 MMOL/L — SIGNIFICANT CHANGE UP (ref 5–17)
ANION GAP SERPL CALC-SCNC: 9 MMOL/L — SIGNIFICANT CHANGE UP (ref 5–17)
APTT BLD: 27.9 SEC — SIGNIFICANT CHANGE UP (ref 27.5–35.5)
APTT BLD: 28.4 SEC — SIGNIFICANT CHANGE UP (ref 27.5–35.5)
APTT BLD: 28.9 SEC — SIGNIFICANT CHANGE UP (ref 27.5–35.5)
AST SERPL-CCNC: 18 U/L — SIGNIFICANT CHANGE UP (ref 10–40)
AST SERPL-CCNC: 20 U/L — SIGNIFICANT CHANGE UP (ref 10–40)
AST SERPL-CCNC: 21 U/L — SIGNIFICANT CHANGE UP (ref 10–40)
BASOPHILS # BLD AUTO: 0.02 K/UL — SIGNIFICANT CHANGE UP (ref 0–0.2)
BASOPHILS # BLD AUTO: 0.04 K/UL — SIGNIFICANT CHANGE UP (ref 0–0.2)
BASOPHILS NFR BLD AUTO: 0.1 % — SIGNIFICANT CHANGE UP (ref 0–2)
BASOPHILS NFR BLD AUTO: 0.2 % — SIGNIFICANT CHANGE UP (ref 0–2)
BILIRUB SERPL-MCNC: 0.4 MG/DL — SIGNIFICANT CHANGE UP (ref 0.2–1.2)
BILIRUB SERPL-MCNC: 0.5 MG/DL — SIGNIFICANT CHANGE UP (ref 0.2–1.2)
BILIRUB SERPL-MCNC: 0.6 MG/DL — SIGNIFICANT CHANGE UP (ref 0.2–1.2)
BLD GP AB SCN SERPL QL: NEGATIVE — SIGNIFICANT CHANGE UP
BUN SERPL-MCNC: 11 MG/DL — SIGNIFICANT CHANGE UP (ref 7–23)
BUN SERPL-MCNC: 11 MG/DL — SIGNIFICANT CHANGE UP (ref 7–23)
BUN SERPL-MCNC: 12 MG/DL — SIGNIFICANT CHANGE UP (ref 7–23)
CALCIUM SERPL-MCNC: 7.7 MG/DL — LOW (ref 8.4–10.5)
CALCIUM SERPL-MCNC: 7.9 MG/DL — LOW (ref 8.4–10.5)
CALCIUM SERPL-MCNC: 7.9 MG/DL — LOW (ref 8.4–10.5)
CHLORIDE SERPL-SCNC: 106 MMOL/L — SIGNIFICANT CHANGE UP (ref 96–108)
CHLORIDE SERPL-SCNC: 108 MMOL/L — SIGNIFICANT CHANGE UP (ref 96–108)
CHLORIDE SERPL-SCNC: 108 MMOL/L — SIGNIFICANT CHANGE UP (ref 96–108)
CO2 SERPL-SCNC: 20 MMOL/L — LOW (ref 22–31)
CREAT SERPL-MCNC: 1.27 MG/DL — SIGNIFICANT CHANGE UP (ref 0.5–1.3)
CREAT SERPL-MCNC: 1.3 MG/DL — SIGNIFICANT CHANGE UP (ref 0.5–1.3)
CREAT SERPL-MCNC: 1.38 MG/DL — HIGH (ref 0.5–1.3)
EGFR: 52 ML/MIN/1.73M2 — LOW
EGFR: 56 ML/MIN/1.73M2 — LOW
EGFR: 58 ML/MIN/1.73M2 — LOW
EOSINOPHIL # BLD AUTO: 0.05 K/UL — SIGNIFICANT CHANGE UP (ref 0–0.5)
EOSINOPHIL # BLD AUTO: 0.06 K/UL — SIGNIFICANT CHANGE UP (ref 0–0.5)
EOSINOPHIL NFR BLD AUTO: 0.3 % — SIGNIFICANT CHANGE UP (ref 0–6)
EOSINOPHIL NFR BLD AUTO: 0.3 % — SIGNIFICANT CHANGE UP (ref 0–6)
FIBRINOGEN PPP-MCNC: 460 MG/DL — HIGH (ref 200–445)
FIBRINOGEN PPP-MCNC: 481 MG/DL — HIGH (ref 200–445)
GLUCOSE SERPL-MCNC: 116 MG/DL — HIGH (ref 70–99)
GLUCOSE SERPL-MCNC: 73 MG/DL — SIGNIFICANT CHANGE UP (ref 70–99)
GLUCOSE SERPL-MCNC: 97 MG/DL — SIGNIFICANT CHANGE UP (ref 70–99)
HCT VFR BLD CALC: 26.3 % — LOW (ref 34.5–45)
HCT VFR BLD CALC: 26.5 % — LOW (ref 34.5–45)
HCT VFR BLD CALC: 26.9 % — LOW (ref 34.5–45)
HGB BLD-MCNC: 8.4 G/DL — LOW (ref 11.5–15.5)
HGB BLD-MCNC: 8.6 G/DL — LOW (ref 11.5–15.5)
HGB BLD-MCNC: 8.9 G/DL — LOW (ref 11.5–15.5)
IMM GRANULOCYTES NFR BLD AUTO: 0.6 % — SIGNIFICANT CHANGE UP (ref 0–0.9)
IMM GRANULOCYTES NFR BLD AUTO: 1.2 % — HIGH (ref 0–0.9)
INR BLD: 0.97 — SIGNIFICANT CHANGE UP (ref 0.88–1.16)
INR BLD: 1 — SIGNIFICANT CHANGE UP (ref 0.88–1.16)
INR BLD: 1.04 — SIGNIFICANT CHANGE UP (ref 0.88–1.16)
LACTATE SERPL-SCNC: 1.9 MMOL/L — SIGNIFICANT CHANGE UP (ref 0.5–2)
LYMPHOCYTES # BLD AUTO: 14.2 % — SIGNIFICANT CHANGE UP (ref 13–44)
LYMPHOCYTES # BLD AUTO: 15.6 % — SIGNIFICANT CHANGE UP (ref 13–44)
LYMPHOCYTES # BLD AUTO: 2.39 K/UL — SIGNIFICANT CHANGE UP (ref 1–3.3)
LYMPHOCYTES # BLD AUTO: 2.74 K/UL — SIGNIFICANT CHANGE UP (ref 1–3.3)
MAGNESIUM SERPL-MCNC: 2 MG/DL — SIGNIFICANT CHANGE UP (ref 1.6–2.6)
MAGNESIUM SERPL-MCNC: 2.2 MG/DL — SIGNIFICANT CHANGE UP (ref 1.6–2.6)
MAGNESIUM SERPL-MCNC: 2.2 MG/DL — SIGNIFICANT CHANGE UP (ref 1.6–2.6)
MCHC RBC-ENTMCNC: 27.1 PG — SIGNIFICANT CHANGE UP (ref 27–34)
MCHC RBC-ENTMCNC: 27.2 PG — SIGNIFICANT CHANGE UP (ref 27–34)
MCHC RBC-ENTMCNC: 27.6 PG — SIGNIFICANT CHANGE UP (ref 27–34)
MCHC RBC-ENTMCNC: 31.9 GM/DL — LOW (ref 32–36)
MCHC RBC-ENTMCNC: 32.5 GM/DL — SIGNIFICANT CHANGE UP (ref 32–36)
MCHC RBC-ENTMCNC: 33.1 GM/DL — SIGNIFICANT CHANGE UP (ref 32–36)
MCV RBC AUTO: 83.3 FL — SIGNIFICANT CHANGE UP (ref 80–100)
MCV RBC AUTO: 83.6 FL — SIGNIFICANT CHANGE UP (ref 80–100)
MCV RBC AUTO: 85.1 FL — SIGNIFICANT CHANGE UP (ref 80–100)
MONOCYTES # BLD AUTO: 1.24 K/UL — HIGH (ref 0–0.9)
MONOCYTES # BLD AUTO: 1.31 K/UL — HIGH (ref 0–0.9)
MONOCYTES NFR BLD AUTO: 7.3 % — SIGNIFICANT CHANGE UP (ref 2–14)
MONOCYTES NFR BLD AUTO: 7.5 % — SIGNIFICANT CHANGE UP (ref 2–14)
NEUTROPHILS # BLD AUTO: 13.07 K/UL — HIGH (ref 1.8–7.4)
NEUTROPHILS # BLD AUTO: 13.21 K/UL — HIGH (ref 1.8–7.4)
NEUTROPHILS NFR BLD AUTO: 75.3 % — SIGNIFICANT CHANGE UP (ref 43–77)
NEUTROPHILS NFR BLD AUTO: 77.4 % — HIGH (ref 43–77)
NRBC # BLD: 0 /100 WBCS — SIGNIFICANT CHANGE UP (ref 0–0)
NT-PROBNP SERPL-SCNC: 1957 PG/ML — HIGH (ref 0–300)
PHOSPHATE SERPL-MCNC: 4.2 MG/DL — SIGNIFICANT CHANGE UP (ref 2.5–4.5)
PHOSPHATE SERPL-MCNC: 4.5 MG/DL — SIGNIFICANT CHANGE UP (ref 2.5–4.5)
PLATELET # BLD AUTO: 101 K/UL — LOW (ref 150–400)
PLATELET # BLD AUTO: 105 K/UL — LOW (ref 150–400)
PLATELET # BLD AUTO: 105 K/UL — LOW (ref 150–400)
POTASSIUM SERPL-MCNC: 3.3 MMOL/L — LOW (ref 3.5–5.3)
POTASSIUM SERPL-MCNC: 3.8 MMOL/L — SIGNIFICANT CHANGE UP (ref 3.5–5.3)
POTASSIUM SERPL-MCNC: 4.2 MMOL/L — SIGNIFICANT CHANGE UP (ref 3.5–5.3)
POTASSIUM SERPL-SCNC: 3.3 MMOL/L — LOW (ref 3.5–5.3)
POTASSIUM SERPL-SCNC: 3.8 MMOL/L — SIGNIFICANT CHANGE UP (ref 3.5–5.3)
POTASSIUM SERPL-SCNC: 4.2 MMOL/L — SIGNIFICANT CHANGE UP (ref 3.5–5.3)
PROT SERPL-MCNC: 4.5 G/DL — LOW (ref 6–8.3)
PROT SERPL-MCNC: 4.8 G/DL — LOW (ref 6–8.3)
PROT SERPL-MCNC: 5.1 G/DL — LOW (ref 6–8.3)
PROTHROM AB SERPL-ACNC: 11.5 SEC — SIGNIFICANT CHANGE UP (ref 10.5–13.4)
PROTHROM AB SERPL-ACNC: 11.9 SEC — SIGNIFICANT CHANGE UP (ref 10.5–13.4)
PROTHROM AB SERPL-ACNC: 12.4 SEC — SIGNIFICANT CHANGE UP (ref 10.5–13.4)
RBC # BLD: 3.09 M/UL — LOW (ref 3.8–5.2)
RBC # BLD: 3.17 M/UL — LOW (ref 3.8–5.2)
RBC # BLD: 3.23 M/UL — LOW (ref 3.8–5.2)
RBC # FLD: 18.6 % — HIGH (ref 10.3–14.5)
RBC # FLD: 18.7 % — HIGH (ref 10.3–14.5)
RBC # FLD: 19.5 % — HIGH (ref 10.3–14.5)
RH IG SCN BLD-IMP: POSITIVE — SIGNIFICANT CHANGE UP
SODIUM SERPL-SCNC: 137 MMOL/L — SIGNIFICANT CHANGE UP (ref 135–145)
SODIUM SERPL-SCNC: 139 MMOL/L — SIGNIFICANT CHANGE UP (ref 135–145)
SODIUM SERPL-SCNC: 140 MMOL/L — SIGNIFICANT CHANGE UP (ref 135–145)
WBC # BLD: 16.89 K/UL — HIGH (ref 3.8–10.5)
WBC # BLD: 17.55 K/UL — HIGH (ref 3.8–10.5)
WBC # BLD: 18.69 K/UL — HIGH (ref 3.8–10.5)
WBC # FLD AUTO: 16.89 K/UL — HIGH (ref 3.8–10.5)
WBC # FLD AUTO: 17.55 K/UL — HIGH (ref 3.8–10.5)
WBC # FLD AUTO: 18.69 K/UL — HIGH (ref 3.8–10.5)

## 2023-06-09 PROCEDURE — 99221 1ST HOSP IP/OBS SF/LOW 40: CPT

## 2023-06-09 PROCEDURE — 99232 SBSQ HOSP IP/OBS MODERATE 35: CPT | Mod: GC

## 2023-06-09 RX ORDER — POTASSIUM CHLORIDE 20 MEQ
40 PACKET (EA) ORAL ONCE
Refills: 0 | Status: COMPLETED | OUTPATIENT
Start: 2023-06-09 | End: 2023-06-09

## 2023-06-09 RX ORDER — FUROSEMIDE 40 MG
20 TABLET ORAL EVERY 24 HOURS
Refills: 0 | Status: DISCONTINUED | OUTPATIENT
Start: 2023-06-09 | End: 2023-06-10

## 2023-06-09 RX ORDER — SENNA PLUS 8.6 MG/1
2 TABLET ORAL AT BEDTIME
Refills: 0 | Status: DISCONTINUED | OUTPATIENT
Start: 2023-06-09 | End: 2023-06-18

## 2023-06-09 RX ORDER — POLYETHYLENE GLYCOL 3350 17 G/17G
17 POWDER, FOR SOLUTION ORAL DAILY
Refills: 0 | Status: DISCONTINUED | OUTPATIENT
Start: 2023-06-09 | End: 2023-06-18

## 2023-06-09 RX ADMIN — Medication 975 MILLIGRAM(S): at 03:06

## 2023-06-09 RX ADMIN — HEPARIN SODIUM 7500 UNIT(S): 5000 INJECTION INTRAVENOUS; SUBCUTANEOUS at 23:10

## 2023-06-09 RX ADMIN — Medication 200 MILLIGRAM(S): at 19:30

## 2023-06-09 RX ADMIN — SODIUM CHLORIDE 3 MILLILITER(S): 9 INJECTION INTRAMUSCULAR; INTRAVENOUS; SUBCUTANEOUS at 06:54

## 2023-06-09 RX ADMIN — Medication 975 MILLIGRAM(S): at 04:53

## 2023-06-09 RX ADMIN — POLYETHYLENE GLYCOL 3350 17 GRAM(S): 17 POWDER, FOR SOLUTION ORAL at 11:07

## 2023-06-09 RX ADMIN — Medication 1 TABLET(S): at 11:07

## 2023-06-09 RX ADMIN — Medication 1 APPLICATION(S): at 23:08

## 2023-06-09 RX ADMIN — Medication 20 MILLIGRAM(S): at 16:14

## 2023-06-09 RX ADMIN — Medication 975 MILLIGRAM(S): at 09:00

## 2023-06-09 RX ADMIN — Medication 975 MILLIGRAM(S): at 08:39

## 2023-06-09 RX ADMIN — AER TRAVELER 1 APPLICATION(S): 0.5 SOLUTION RECTAL; TOPICAL at 23:17

## 2023-06-09 RX ADMIN — PIPERACILLIN AND TAZOBACTAM 25 GRAM(S): 4; .5 INJECTION, POWDER, LYOPHILIZED, FOR SOLUTION INTRAVENOUS at 03:07

## 2023-06-09 RX ADMIN — SODIUM CHLORIDE 3 MILLILITER(S): 9 INJECTION INTRAMUSCULAR; INTRAVENOUS; SUBCUTANEOUS at 14:00

## 2023-06-09 RX ADMIN — HEPARIN SODIUM 7500 UNIT(S): 5000 INJECTION INTRAVENOUS; SUBCUTANEOUS at 07:01

## 2023-06-09 RX ADMIN — Medication 975 MILLIGRAM(S): at 16:13

## 2023-06-09 RX ADMIN — SODIUM CHLORIDE 3 MILLILITER(S): 9 INJECTION INTRAMUSCULAR; INTRAVENOUS; SUBCUTANEOUS at 22:00

## 2023-06-09 RX ADMIN — PIPERACILLIN AND TAZOBACTAM 25 GRAM(S): 4; .5 INJECTION, POWDER, LYOPHILIZED, FOR SOLUTION INTRAVENOUS at 11:07

## 2023-06-09 RX ADMIN — Medication 200 MILLIGRAM(S): at 07:02

## 2023-06-09 RX ADMIN — Medication 975 MILLIGRAM(S): at 21:31

## 2023-06-09 RX ADMIN — Medication 40 MILLIEQUIVALENT(S): at 03:06

## 2023-06-09 RX ADMIN — PIPERACILLIN AND TAZOBACTAM 25 GRAM(S): 4; .5 INJECTION, POWDER, LYOPHILIZED, FOR SOLUTION INTRAVENOUS at 19:07

## 2023-06-09 RX ADMIN — Medication 975 MILLIGRAM(S): at 16:15

## 2023-06-09 RX ADMIN — HEPARIN SODIUM 7500 UNIT(S): 5000 INJECTION INTRAVENOUS; SUBCUTANEOUS at 14:07

## 2023-06-09 NOTE — PROGRESS NOTE ADULT - SUBJECTIVE AND OBJECTIVE BOX
Patient evaluated at bedside this morning, resting comfortably in bed, no acute events overnight.  She reports pain is well controlled with tylenol and motrin.  Reports decrease in amount of vaginal bleeding.  She has not yet ambulated, kaba in situ.   Tolerating food well, without nausea/vomit.    Denies fevers/chills.  Denies headache, scotoma, RUQ pain, SOB.     Physical Exam:  T(C): 37.1 (06-09-23 @ 05:41), Max: 37.9 (06-08-23 @ 21:49)  HR: 77 (06-09-23 @ 06:00) (77 - 103)  BP: 122/70 (06-09-23 @ 06:00) (112/73 - 150/85)  RR: 20 (06-09-23 @ 06:00) (18 - 25)  SpO2: 95% (06-09-23 @ 06:00) (94% - 97%)    GA: NAD, patient is alert and oriented  Resp: No increased work of breathing, Mild crackles at bases of lungs, otherwise clear to auscultation  Abd: Soft, mildly tender. Fundus midline and firm below umbilicus.   Perineum: Mild bleeding on pad.   Extremities: no swelling or calf tenderness                          8.6    17.55 )-----------( 105      ( 09 Jun 2023 05:32 )             26.5     06-09    140  |  108  |  11  ----------------------------<  73  3.8   |  20<L>  |  1.30    Ca    7.7<L>      09 Jun 2023 05:32  Phos  4.5     06-09  Mg     2.2     06-09    TPro  4.5<L>  /  Alb  2.5<L>  /  TBili  0.4  /  DBili  x   /  AST  21  /  ALT  9<L>  /  AlkPhos  170<H>  06-09    acetaminophen     Tablet .. 975 milliGRAM(s) Oral <User Schedule>  benzocaine 20%/menthol 0.5% Spray 1 Spray(s) Topical every 6 hours PRN  dibucaine 1% Ointment 1 Application(s) Topical every 6 hours PRN  diphenhydrAMINE 25 milliGRAM(s) Oral every 6 hours PRN  diphtheria/tetanus/pertussis (acellular) Vaccine (Adacel) 0.5 milliLiter(s) IntraMuscular once  heparin   Injectable 7500 Unit(s) SubCutaneous every 8 hours  hydrocortisone 1% Cream 1 Application(s) Topical every 6 hours PRN  labetalol 200 milliGRAM(s) Oral every 12 hours  lanolin Ointment 1 Application(s) Topical every 6 hours PRN  magnesium hydroxide Suspension 30 milliLiter(s) Oral two times a day PRN  oxyCODONE    IR 5 milliGRAM(s) Oral once PRN  oxyCODONE    IR 5 milliGRAM(s) Oral every 3 hours PRN  piperacillin/tazobactam IVPB.. 3.375 Gram(s) IV Intermittent every 8 hours  pramoxine 1%/zinc 5% Cream 1 Application(s) Topical every 4 hours PRN  prenatal multivitamin 1 Tablet(s) Oral daily  simethicone 80 milliGRAM(s) Chew every 4 hours PRN  sodium chloride 0.9% lock flush 3 milliLiter(s) IV Push every 8 hours  witch hazel Pads 1 Application(s) Topical every 4 hours PRN

## 2023-06-09 NOTE — DIETITIAN INITIAL EVALUATION ADULT - ADD RECOMMEND
1. Consider DASH/TLC diet  - monitor intake & tolerance  - consider ONS to assist in meeting needs if PO intake <50%  2. Hydration per team  - monitor fluid & volume status, consider fluid restriction of 1.2-1.5L daily  3. Vitamins & minerals per team  4. Monitor lytes & replenish prn  - risk for depletion on diuretic  5. Monitor labs, GI function, skin integrity

## 2023-06-09 NOTE — CONSULT NOTE ADULT - SUBJECTIVE AND OBJECTIVE BOX
HPI:      ROS: A 10-point review of systems was otherwise negative.    PAST MEDICAL & SURGICAL HISTORY:      SOCIAL HISTORY:  FAMILY HISTORY:      ALLERGIES: 	  No Known Allergies            MEDICATIONS:  acetaminophen     Tablet .. 975 milliGRAM(s) Oral <User Schedule>  benzocaine 20%/menthol 0.5% Spray 1 Spray(s) Topical every 6 hours PRN  dibucaine 1% Ointment 1 Application(s) Topical every 6 hours PRN  diphenhydrAMINE 25 milliGRAM(s) Oral every 6 hours PRN  diphtheria/tetanus/pertussis (acellular) Vaccine (Adacel) 0.5 milliLiter(s) IntraMuscular once  heparin   Injectable 7500 Unit(s) SubCutaneous every 8 hours  hydrocortisone 1% Cream 1 Application(s) Topical every 6 hours PRN  labetalol 200 milliGRAM(s) Oral every 12 hours  lanolin Ointment 1 Application(s) Topical every 6 hours PRN  magnesium hydroxide Suspension 30 milliLiter(s) Oral two times a day PRN  oxyCODONE    IR 5 milliGRAM(s) Oral once PRN  oxyCODONE    IR 5 milliGRAM(s) Oral every 3 hours PRN  piperacillin/tazobactam IVPB.. 3.375 Gram(s) IV Intermittent every 8 hours  polyethylene glycol 3350 17 Gram(s) Oral daily  pramoxine 1%/zinc 5% Cream 1 Application(s) Topical every 4 hours PRN  prenatal multivitamin 1 Tablet(s) Oral daily  senna 2 Tablet(s) Oral at bedtime  simethicone 80 milliGRAM(s) Chew every 4 hours PRN  sodium chloride 0.9% lock flush 3 milliLiter(s) IV Push every 8 hours  witch hazel Pads 1 Application(s) Topical every 4 hours PRN      HOME MEDICATIONS:      PHYSICAL EXAM:      I/O Summary 24H    IN: 450 mL / OUT: 7230 mL / NET: -6780 mL        T(F): 97.2 (06-09-23 @ 09:04), Max: 102.3 (06-08-23 @ 17:00)  HR: 87 (06-09-23 @ 13:00) (77 - 114)  BP: 134/83 (06-09-23 @ 13:00) (112/73 - 176/88)  BP(mean): 104 (06-09-23 @ 13:00) (77 - 121)  ABP: --  ABP(mean): --  RR: 28 (06-09-23 @ 13:00) (18 - 28)  SpO2: 98% (06-09-23 @ 13:00) (94% - 100%)    GEN: Awake, comfortable. NAD.   HEENT: NCAT, PERRL, EOMI. Mucosa moist. No JVD.   RESP: CTA b/l  CV: RRR, normal s1/s2. No m/r/g.  ABD: Soft, NTND. BS+  EXT: Warm. No edema, clubbing, or cyanosis.   NEURO: AAOx3. No focal deficits.      	  LABS:	 	    CARDIAC MARKERS:              CBC 06-09-23 @ 10:55                        8.4    16.89 )-----------( 101                   26.3       Hgb trend: 8.4 <-- , 8.6 <-- , 8.9 <-- , 10.2 <-- , 9.4 <-- , 9.7 <-- , 10.2 <-- , 10.5 <-- , 10.2 <-- , 9.5 <--   WBC trend: 16.89 <-- , 17.55 <-- , 18.69 <-- , 20.71 <-- , 25.71 <-- , 20.28 <-- , 17.09 <-- , 11.79 <-- , 9.64 <-- , 8.07 <--     CMP 06-09-23 @ 10:55    137  |  108  |  11  ----------------------------<  97  4.2   |  20<L>  |  1.27    Ca    7.9<L>      06-09-23 @ 10:55  Phos  4.2     06-09  Mg     2.0     06-09    TPro  5.1<L>  /  Alb  2.3<L>  /  TBili  0.5  /  DBili  x   /  AST  18  /  ALT  7<L>  /  AlkPhos  160<H>  06-09      Serum Cr trend: 1.27 <-- , 1.30 <-- , 1.38 <-- , 1.41 <-- , 1.23 <-- , 1.25 <-- , 1.07 <-- , 1.02 <-- , 0.95 <--   proBNP:   Lipid Profile:   HgA1c:   TSH:     TELEMETRY: 	    ECG:  	  RADIOLOGY:   ECHO:  STRESS:  CATH:   HPI:  31yo  woman w/ hx HTN initially admitted to OB L&D on  for induction of labor s/p vaginal delivery  c/b preeclampsia w/ severe features and sepsis in setting of chorioamnionitis and GNR bacteremia 2/2 E.coli. Patient's pre-delivery hospital course, notable for being treated for chorioamnionitis w/ IV abx gentamicin, clindamycin, and ampicillin and received approximately total 5L IVF cumulative since admission. Patient underwent vaginal delivery of full-term fetus at approx 10PM on  w/ post-delivery complication of heavy vaginal bleeding managed w/ TXA, 2u pRBC transfusion. Following pRBC transfusion post-delivery, patient reported mild to moderate symptoms of shortness of breath, noted for SpO2 92-94% on 2L NC, systolic BP stable b/t 150-160s, bibasilar crackles on auscultation. Transferred to MICU for acute hypoxic respiratory failure in setting of suspected acute pulmonary edema, received IV lasix 40mg x1. Cardiology consulted for concern for heart failure.    On evaluation, patient denies any previous dyspnea, orthopnea, or lower extremity edema outside of pregnancy. She denies any family history of cardiomyopathy or cardiac disease. She was diagnosed with preeclampsia without severe features in 3rd trimester, but then post partum she was diagnosed with preclampsia with severe features (Cr 1.28 and pulmonary edema). Otherwise, she denies any current symptoms.       ROS: A 10-point review of systems was otherwise negative.    PAST MEDICAL & SURGICAL HISTORY:      SOCIAL HISTORY:  FAMILY HISTORY:      ALLERGIES: 	  No Known Allergies            MEDICATIONS:  acetaminophen     Tablet .. 975 milliGRAM(s) Oral <User Schedule>  benzocaine 20%/menthol 0.5% Spray 1 Spray(s) Topical every 6 hours PRN  dibucaine 1% Ointment 1 Application(s) Topical every 6 hours PRN  diphenhydrAMINE 25 milliGRAM(s) Oral every 6 hours PRN  diphtheria/tetanus/pertussis (acellular) Vaccine (Adacel) 0.5 milliLiter(s) IntraMuscular once  heparin   Injectable 7500 Unit(s) SubCutaneous every 8 hours  hydrocortisone 1% Cream 1 Application(s) Topical every 6 hours PRN  labetalol 200 milliGRAM(s) Oral every 12 hours  lanolin Ointment 1 Application(s) Topical every 6 hours PRN  magnesium hydroxide Suspension 30 milliLiter(s) Oral two times a day PRN  oxyCODONE    IR 5 milliGRAM(s) Oral once PRN  oxyCODONE    IR 5 milliGRAM(s) Oral every 3 hours PRN  piperacillin/tazobactam IVPB.. 3.375 Gram(s) IV Intermittent every 8 hours  polyethylene glycol 3350 17 Gram(s) Oral daily  pramoxine 1%/zinc 5% Cream 1 Application(s) Topical every 4 hours PRN  prenatal multivitamin 1 Tablet(s) Oral daily  senna 2 Tablet(s) Oral at bedtime  simethicone 80 milliGRAM(s) Chew every 4 hours PRN  sodium chloride 0.9% lock flush 3 milliLiter(s) IV Push every 8 hours  witch hazel Pads 1 Application(s) Topical every 4 hours PRN      HOME MEDICATIONS:      PHYSICAL EXAM:      I/O Summary 24H    IN: 450 mL / OUT: 7230 mL / NET: -6780 mL        T(F): 97.2 (23 @ 09:04), Max: 102.3 (23 @ 17:00)  HR: 87 (23 @ 13:00) (77 - 114)  BP: 134/83 (23 @ 13:00) (112/73 - 176/88)  BP(mean): 104 (23 @ 13:00) (77 - 121)  ABP: --  ABP(mean): --  RR: 28 (23 @ 13:00) (18 - 28)  SpO2: 98% (23 @ 13:00) (94% - 100%)    GEN: Awake, comfortable. NAD.   HEENT: NCAT, PERRL, EOMI. Mucosa moist. No JVD.   RESP: CTA b/l  CV: RRR, normal s1/s2. No m/r/g.  ABD: Soft, NTND. BS+  EXT: Warm. No edema, clubbing, or cyanosis.   NEURO: AAOx3. No focal deficits.      	  LABS:	 	    CARDIAC MARKERS:              CBC 23 @ 10:55                        8.4    16.89 )-----------( 101                   26.3       Hgb trend: 8.4 <-- , 8.6 <-- , 8.9 <-- , 10.2 <-- , 9.4 <-- , 9.7 <-- , 10.2 <-- , 10.5 <-- , 10.2 <-- , 9.5 <--   WBC trend: 16.89 <-- , 17.55 <-- , 18.69 <-- , 20.71 <-- , 25.71 <-- , 20.28 <-- , 17.09 <-- , 11.79 <-- , 9.64 <-- , 8.07 <--     CMP 23 @ 10:55    137  |  108  |  11  ----------------------------<  97  4.2   |  20<L>  |  1.27    Ca    7.9<L>      23 @ 10:55  Phos  4.2       Mg     2.0         TPro  5.1<L>  /  Alb  2.3<L>  /  TBili  0.5  /  DBili  x   /  AST  18  /  ALT  7<L>  /  AlkPhos  160<H>        Serum Cr trend: 1.27 <-- , 1.30 <-- , 1.38 <-- , 1.41 <-- , 1.23 <-- , 1.25 <-- , 1.07 <-- , 1.02 <-- , 0.95 <--   proBNP:   Lipid Profile:   HgA1c:   TSH:     TELEMETRY: 	    ECG:  	  RADIOLOGY:   ECHO:  STRESS:  CATH:

## 2023-06-09 NOTE — CONSULT NOTE ADULT - ATTENDING COMMENTS
33yo  woman w/ gHTN admitted for IOL s/p vaginal delivery  c/b PEC with SF and chorioamnionitis. Admitted to MICU for acute hypoxic respiratory failure with acute pulmonary edema, s/p IV lasix 40mg x1 with improvement of SOB.      EKG 23: NSR  TTE 23: EF 50-55%, G2DD, dilated LA, moderate MR, PASP 44    O/E  Gen: NAD  CVS: S1S2, RRR, M  Lungs: CTA  Extrem: +2 b/l edema    #PEC s/p SF, HRpEF   - lasix 20mg PO QD   - daily weights, strict I/O   - Continue labetalol 200mg BID
31 yo F with gestational HTN, now , admitted  for induction (pre-eclampsia) s/p vaginal delivery  c/b chorioamnionitis, postpartum hemorrhage due to atony.  She was initially given amp and gent, remained febrile, then given cefazolin and clinda.  Overnight on , blood cultures from  grew GNR, BCID identified E coli.  She was transferred to MICU for acute hypoxic respiratory failure in setting of suspected pulmonary edema.  She is currently on pip-tazo and gent.  Has no localizing symptoms except SOB improved by O2.  On exam late this morning, she was nontoxic appearing, had NC in place, exam unremarkable.  Would f/u blood cultures for ID by growth and susceptibility, send surveillance blood culture, continue pip-tazo, d/c gent.  Will follow with you – team 1.
Assessment and Plan:  S/p vaginal delivery  Pre-eclampsia with severe features  Sepsis secondary to chorioamnionitis  GNR bacteremia  Bleeding - s/p 2u PRBC and intrauterine device to suction (failed attempted removal due to recurrent bleed), s/p TXA X 1  Volume overload (s/p about 5.5L fluid intake since admission)  -admit to ICU  -start HFNC  -CXR  -UA  -procalcitonin  -repeat blood cultures  -zosyn renal dose  -follow up with ID  -Echocardiogram  -if BP stable, we'll consider gentle diuresis  -CBC q6h  -repeat TXA; continue intrauterine device to suction  -OB follow up appreciated  -we'll consider CT abdomen/pelvis  -send CMP, Phos  -send LDH, ret count, haptoglobin, fibrinogen  -continue IV mag drip - goal Mag level 4.5-6.5; Mag level q4h  -send CPK, uric acid  -follow up lactate levels  DVT prophylaxis: mechanical prophylaxis only

## 2023-06-09 NOTE — PROGRESS NOTE ADULT - ASSESSMENT
Ms. Qureshi is a 31yo  woman w/ hx HTN initially admitted to OB L&D on  for induction of labor s/p vaginal delivery  c/b preeclampsia w/ severe features and sepsis in setting of chorioamnionitis and GNR bacteremia 2/2 E.coli, transferred to MICU for acute hypoxic respiratory failure in setting of acute pulmonary congestion 2/2 receiving large volume IVF resuscitation. Patient clinically stable on exam though febrile overnight, likely from balloon that has now been removed.     Recommendations:   - cont Zosyn 3.375G q8hrs   - follow repeat BCx  - await sensitivities for initial BCx     ID Team 1 will continue to follow

## 2023-06-09 NOTE — DIETITIAN INITIAL EVALUATION ADULT - OTHER CALCULATIONS
Ideal body weight (52 kg) used for calculations as pt >120% of IBW. Needs estimated for age and adjusted for current clinical status & increased needs iso current clinical status

## 2023-06-09 NOTE — PROVIDER CONTACT NOTE (OTHER) - SITUATION
SBP s/p Hydralazine administration
T 102.3 oral
Lasix 40 mg IVP administered at 10:11; UO elevated 400-650/hr since 10 AM
Pt complains of eye and mouth pain .

## 2023-06-09 NOTE — PROGRESS NOTE ADULT - SUBJECTIVE AND OBJECTIVE BOX
Hospital courseL Ms. Qureshi is a 31yo  woman w/ hx HTN initially admitted to OB L&D on  for induction of labor s/p vaginal delivery  c/b preeclampsia w/ severe features and sepsis in setting of chorioamnionitis and GNR bacteremia 2/2 E.coli. Patient's pre-delivery hospital course, notable for being treated for chorioamnionitis w/ IV abx gentamicin, clindamycin, and ampicillin and received approximately total 5L IVF cumulative since admission. Patient underwent vaginal delivery of full-term fetus at approx 10PM on  w/ post-delivery complication of heavy vaginal bleeding managed w/ TXA, 2u pRBC transfusion, and placement of intrauterine device connected to suction. Noted also to be having fevers up to 101-102F prior to delivery w/ blood cultures growing out E.coli and recommended by consulting ID team to initiate on IV CTX. Following pRBC transfusion post-delivery, patient reported mild to moderate symptoms of shortness of breath, noted for SpO2 92-94% on 2L NC, systolic BP stable b/t 150-160s, bibasilar crackles on auscultation. ICU team consulted for concern of acute hypoxic respiratory failure in setting of suspected acute pulmonary edema. She otherwise denies recent or active wheezing, chest pain, palpitations, extremity pain or swelling. Patient b/l B lines on POCUS, IV lasix 40mg x1 given. Discontinued gentamycin and clindamycin, continued zosyn. ID following. surveillance bcx sent. Patient currently afebrile, better bp control, currently on RA. Patient no longer requiring ICU level of care. ECHO  showed Normal left ventricular size, Mildly reduced left ventricular systolic function, Grade II left ventricular diastolic dysfunction, Dilated left atrium, Moderate mitral regurgitation, Pulmonary artery systolic pressure is 44 mmHg,  Trivial pericardial effusion. Cardiology consulted for peripartum cardiomyopathy.  Hospital courseL Ms. Qureshi is a 33yo  woman w/ hx HTN initially admitted to OB L&D on  for induction of labor s/p vaginal delivery  c/b preeclampsia w/ severe features and sepsis in setting of chorioamnionitis and GNR bacteremia 2/2 E.coli. Patient's pre-delivery hospital course, notable for being treated for chorioamnionitis w/ IV abx gentamicin, clindamycin, and ampicillin and received approximately total 5L IVF cumulative since admission. Patient underwent vaginal delivery of full-term fetus at approx 10PM on  w/ post-delivery complication of heavy vaginal bleeding managed w/ TXA, 2u pRBC transfusion, and placement of intrauterine device connected to suction. Noted also to be having fevers up to 101-102F prior to delivery w/ blood cultures growing out E.coli and recommended by consulting ID team to initiate on IV CTX. Following pRBC transfusion post-delivery, patient reported mild to moderate symptoms of shortness of breath, noted for SpO2 92-94% on 2L NC, systolic BP stable b/t 150-160s, bibasilar crackles on auscultation. ICU team consulted for concern of acute hypoxic respiratory failure in setting of suspected acute pulmonary edema. She otherwise denies recent or active wheezing, chest pain, palpitations, extremity pain or swelling. Patient b/l B lines on POCUS, IV lasix 40mg x1 given. Discontinued gentamycin and clindamycin, continued zosyn. ID following. surveillance bcx sent. Patient currently afebrile, better bp control, currently on RA. Patient no longer requiring ICU level of care. ECHO  showed Normal left ventricular size, Mildly reduced left ventricular systolic function, Grade II left ventricular diastolic dysfunction, Dilated left atrium, Moderate mitral regurgitation, Pulmonary artery systolic pressure is 44 mmHg,  Trivial pericardial effusion. Cardiology consulted for peripartum cardiomyopathy.   INTERVAL HPI/OVERNIGHT EVENTS:    SUBJECTIVE: Patient seen and examined at bedside. No acute complaints this morning.  denies recent or active wheezing, chest pain, palpitations, extremity pain or swelling.       VITAL SIGNS:  ICU Vital Signs Last 24 Hrs  T(C): 36.2 (2023 09:04), Max: 39.1 (2023 17:00)  T(F): 97.2 (2023 09:04), Max: 102.3 (2023 17:00)  HR: 87 (2023 13:00) (77 - 114)  BP: 134/83 (2023 13:00) (112/73 - 176/88)  BP(mean): 104 (2023 13:00) (77 - 121)  ABP: --  ABP(mean): --  RR: 28 (2023 13:00) (18 - 28)  SpO2: 98% (2023 13:00) (94% - 100%)    O2 Parameters below as of 2023 13:00  Patient On (Oxygen Delivery Method): room air               @ 07:01  -   @ 07:00  --------------------------------------------------------  IN: 450 mL / OUT: 7230 mL / NET: -6780 mL     @ 07:01  -  09 @ 13:32  --------------------------------------------------------  IN: 750 mL / OUT: 975 mL / NET: -225 mL      CAPILLARY BLOOD GLUCOSE          PHYSICAL EXAM:  General: NAD, laying in bed, speaking in full sentences  HEENT: head NC/AT, no conjunctival injection, EOMI, MMM  Neck: supple, no JVD  Cardio: RRR, +S1/S2, no M/R/G  Resp: lungs CTAB, no cough/wheezes/rales/rhonchi  Abdo: soft, NT, ND, +BS, no organomegaly or palpable mass    Extremities: WWP, 1+ non-pitting edema below the knees b/l  Vasc: 2+ radial and DP pulses b/l  Neuro: A&Ox3  Psych: speech non-pressured, thoughts goal-oriented  Skin: dry, intact, no visible jaundice   MSK: no joint swelling      MEDICATIONS:  MEDICATIONS  (STANDING):  acetaminophen     Tablet .. 975 milliGRAM(s) Oral <User Schedule>  diphtheria/tetanus/pertussis (acellular) Vaccine (Adacel) 0.5 milliLiter(s) IntraMuscular once  heparin   Injectable 7500 Unit(s) SubCutaneous every 8 hours  labetalol 200 milliGRAM(s) Oral every 12 hours  piperacillin/tazobactam IVPB.. 3.375 Gram(s) IV Intermittent every 8 hours  polyethylene glycol 3350 17 Gram(s) Oral daily  prenatal multivitamin 1 Tablet(s) Oral daily  senna 2 Tablet(s) Oral at bedtime  sodium chloride 0.9% lock flush 3 milliLiter(s) IV Push every 8 hours    MEDICATIONS  (PRN):  benzocaine 20%/menthol 0.5% Spray 1 Spray(s) Topical every 6 hours PRN for Perineal discomfort  dibucaine 1% Ointment 1 Application(s) Topical every 6 hours PRN Perineal discomfort  diphenhydrAMINE 25 milliGRAM(s) Oral every 6 hours PRN Pruritus  hydrocortisone 1% Cream 1 Application(s) Topical every 6 hours PRN Moderate Pain (4-6)  lanolin Ointment 1 Application(s) Topical every 6 hours PRN nipple soreness  magnesium hydroxide Suspension 30 milliLiter(s) Oral two times a day PRN Constipation  oxyCODONE    IR 5 milliGRAM(s) Oral once PRN Moderate to Severe Pain (4-10)  oxyCODONE    IR 5 milliGRAM(s) Oral every 3 hours PRN Moderate to Severe Pain (4-10)  pramoxine 1%/zinc 5% Cream 1 Application(s) Topical every 4 hours PRN Moderate Pain (4-6)  simethicone 80 milliGRAM(s) Chew every 4 hours PRN Gas  witch hazel Pads 1 Application(s) Topical every 4 hours PRN Perineal discomfort      ALLERGIES:  Allergies    No Known Allergies    Intolerances        LABS:                        8.4    16.89 )-----------( 101      ( 2023 10:55 )             26.3     06-09    137  |  108  |  11  ----------------------------<  97  4.2   |  20<L>  |  1.27    Ca    7.9<L>      2023 10:55  Phos  4.2     06-09  Mg     2.0     06-09    TPro  5.1<L>  /  Alb  2.3<L>  /  TBili  0.5  /  DBili  x   /  AST  18  /  ALT  7<L>  /  AlkPhos  160<H>      PT/INR - ( 2023 10:55 )   PT: 11.9 sec;   INR: 1.00          PTT - ( 2023 10:55 )  PTT:28.4 sec  Urinalysis Basic - ( 2023 22:16 )    Color: Yellow / Appearance: Clear / S.015 / pH: x  Gluc: x / Ketone: NEGATIVE  / Bili: Negative / Urobili: 0.2 E.U./dL   Blood: x / Protein: NEGATIVE mg/dL / Nitrite: NEGATIVE   Leuk Esterase: NEGATIVE / RBC: > 10 /HPF / WBC < 5 /HPF   Sq Epi: x / Non Sq Epi: x / Bacteria: Present /HPF        RADIOLOGY & ADDITIONAL TESTS: Reviewed.

## 2023-06-09 NOTE — PROGRESS NOTE ADULT - ASSESSMENT
Ms. Qureshi is a 31yo  woman w/ hx HTN initially admitted to OB L&D on  for induction of labor s/p vaginal delivery  c/b preeclampsia w/ severe features and sepsis in setting of chorioamnionitis and GNR bacteremia 2/2 E.coli, ICU team consulted for concern acute hypoxic respiratory failure in setting of acute pulmonary congestion 2/2 receiving large volume IVF resuscitation.    Plan (system-based):  Neuro  - AAOx3, SEBASTIAN, mentating at usual baseline    Pulmonology  #Acute hypoxic respiratory failure  Pt p/w acute dyspnea shortly following post-vaginal delivery prbc transfusion, had additionally received approx 4-5L IVF cumulatively over past 36hrs since admission on . CXR shows signs mild pulmonary vascular congestion otherwise negative for acute pleural effusions, consolidation, or signs of pneumothorax. ABG negative for acute hypercapnia.  -Pulmonary edema, b/l B lines on POCUS, IV lasix 40mg x1 given.   - currently on RA    Cardiology  #Preeclampsia w/ severe features, s/p IV Mg x10hrs for seizure prophylaxis (-)  Noted for -160s both pre and post-delivery, mild lactate 2.2, elevated Cr 1.2, signs mild pulmonary edema on CXR, otherwise denies headaches however reports visual scotomas.  - SBP goal <160/<110. Give IV labetalol or hydralazine if needed  c/w labetalol 200 TID (-) with hold parameters     Gastrointestinal  - SEBASTIAN    Nephrology  #CARYN in setting of Preeclampsia  BUN/Cr elevated from admission Cr  0.95 to 1.23 over past 2 days.  - caution additional fluid repletion as likely in mild fluid overload state  - trend BMP Q6hrs   - trend serum Mg Q6hrs, goal serum Mg 4-8 per OB team for management of preeclampsia w/ severe features. No more Mg per MFM  - strict I/O  - Avoid nephrotoxic medications    Infectious  # Sepsis 2/2 Chorioamnionitis and E.coli bacteremia  Patient w  23, complicated by chorioamniotis w Tmax to 39.6, and subsequent E.coli bacteremia. Received gentamycin, ampicillin, clindamycin for choriamionitis. On zosyn for management GNR bacteremia 2/2 e.coli  - DC gent and clindamycin  - continue with zosyn.   - send UA  - ID consulted, appreciate recs   - Ob/GYN following, appreciate recs if any changes to management    Hematology  #Postpartum hemorrhage  PPH 2/2 uterine atony, received tocolytics, TXA, intrauterine device connected to suction, transfused 2u pRBCs post-delivery  - Active T and S  - s/p NIKKI (-)8.  - Transfuse for HgB >7    Endocrine  No known history of DM2 or hypothyroidism  - Draw A1C and TSH  - FSG q6    Prophylaxis  Regular diet  Replete K>4, Mg>2  DVT PPX: continue SQH   No IVF  Dispo: MICU

## 2023-06-09 NOTE — PROVIDER CONTACT NOTE (OTHER) - ACTION/TREATMENT ORDERED:
No interventions at present per MD. Will continue to monitor.
No intervention needed.
pending orders at this time. No nurse intervention at this time.

## 2023-06-09 NOTE — PROVIDER CONTACT NOTE (OTHER) - ASSESSMENT
Clear yellow urine noted from indwelling kaba catheter. Pt denies dizziness/lightheadedness.
Pt c/o feeling warm.  /81 RR23 O2sat 97% on 2L NC
Pt eating lunch feeding assisted by spouse. pt stated eye pain and mouth with no headache. pt denies chest pain and groin pain.
Pt is AOx4, breathing spontaneously on RA, in stable condition and in no pain and/or distress.

## 2023-06-09 NOTE — CHART NOTE - NSCHARTNOTEFT_GEN_A_CORE
Patient assessed at bedside for passage of clot. Clot appeared well-organized, black, tennis ball-size with little accompanying liquid blood c/w with old blood, most likely congealed in the vaginal vault over a long period of time. Not concerning for ongoing hemorrhage. Patient does not endorse heavy vaginal bleeding, remains hemodynamically stable. She denies headache, dizziness, visual disturbances, nausea/vomiting, RUQ pain, epigastric pain, and SOB. Hgb levels most recently stable, labs @00:00 pending. Continue to monitor closely. OB to continue ongoing hemorrhage or preeclampsia assessment.      Miranda Rhodes MD PGY3

## 2023-06-09 NOTE — PROVIDER CONTACT NOTE (OTHER) - RECOMMENDATIONS
ice packs; CBC, CMP, coags
eye drops and pt reassessment.
Keep provider and team informed s/p Hydralazine administration

## 2023-06-09 NOTE — DIETITIAN INITIAL EVALUATION ADULT - PERTINENT LABORATORY DATA
06-09    137  |  108  |  11  ----------------------------<  97  4.2   |  20<L>  |  1.27    Ca    7.9<L>      09 Jun 2023 10:55  Phos  4.2     06-09  Mg     2.0     06-09    TPro  5.1<L>  /  Alb  2.3<L>  /  TBili  0.5  /  DBili  x   /  AST  18  /  ALT  7<L>  /  AlkPhos  160<H>  06-09

## 2023-06-09 NOTE — PROGRESS NOTE ADULT - ASSESSMENT
A/P 32y s/p  c/b E coli sepsis, postpartum hemorrhage, PEC w/ SF, and pulmonary edema. PPD#1, stable, meeting postpartum milestones  - Neuro: Tylenol 1000mg q6h, oxy 5/10 PRN. Hold NSAIDs for CARYN.   - Cards: Tachycardic. PEC w/SF (BP, etc.) s/p IV Mg x10hrs (-), labetalol 200 TID (-). Pulmonary HTN and possible peripartum cardiomyopathy. [ ] Cards/heart failure consult in AM, f/u recs   - Resp: Pulmonary edema, s/p lasix 40 x2 on , now ORA   - GI/FEN: Regular diet, no maintenance fluids. Strict I&Os.   - : Maki, UOP adequate. PEC w/ SF (Cr) vs CARYN from Sepsis   - OB/gyn: Chorioamnionitis. PEC w/ SF (BP, Cr, scotoma) s/p IV Mg x12h -. Scotoma resolved. No more Mg per MFM. S/p . s/p NIKKI (-)8. S/p lactation consult. Breast pump at bedside. Strict pad counts.   - ID: ID following, f/u recs. E. coli bacteremia. Sensitivities pending. Zosyn 3.375 q4, s/p Gent (-), s/p Clinda (), S/p amp 2g x1 and ancef 2g x1 . Surveillance blood cultures   - DVT PPX: SQH       A/P 32y s/p  c/b E coli sepsis, postpartum hemorrhage, PEC w/ SF, and pulmonary edema. PPD#1, stable, meeting postpartum milestones  - Neuro: Tylenol 1000mg q6h, oxy 5/10 PRN. Hold NSAIDs for CARYN.   - Cards: Tachycardic (resolved). PEC w/SF (BP, scotoma) s/p IV Mg x10hrs for seizure prophylaxis (-), labetalol 200 TID (-). Pulmonary HTN and possible peripartum cardiomyopathy. [ ] Cards/heart failure consult in AM, f/u recs   - Resp: Pulmonary edema, s/p lasix 40 x2 on , now ORA   - GI/FEN: Regular diet, no maintenance fluids. Strict I&Os.   - : Maki, UOP adequate. PEC w/ SF (Cr) vs CARYN from Sepsis   - OB/gyn: Chorioamnionitis. PEC w/ SF (BP, Cr, scotoma) s/p IV Mg x12h -, PPH. Scotoma resolved. No more Mg per MFM. S/p . s/p NIKKI (-)8. S/p lactation consult. Breast pump at bedside. Strict pad counts.   - ID: ID following, f/u recs. E. coli bacteremia. Sensitivities pending. On Zosyn 3.375 q4, s/p Gent (-), s/p Clinda (-), S/p amp 2g x1 and ancef 2g x1 . Surveillance blood cultures   - DVT PPX: continue SQH   - Please maintain active type and screen

## 2023-06-09 NOTE — DIETITIAN INITIAL EVALUATION ADULT - OTHER INFO
33yo  woman w/ hx HTN initially admitted to OB L&D on  for induction of labor s/p vaginal delivery  c/b preeclampsia w/ severe features and sepsis in setting of chorioamnionitis and GNR bacteremia 2/2 E.coli. Patient's pre-delivery hospital course, notable for being treated for chorioamnionitis w/ IV abx gentamicin, clindamycin, and ampicillin and received approximately total 5L IVF cumulative since admission. Patient underwent vaginal delivery of full-term fetus at approx 10PM on  w/ post-delivery complication of heavy vaginal bleeding managed w/ TXA, 2u pRBC transfusion. Following pRBC transfusion post-delivery, patient reported mild to moderate symptoms of shortness of breath, noted for SpO2 92-94% on 2L NC, systolic BP stable b/t 150-160s, bibasilar crackles on auscultation. Transferred to MICU for acute hypoxic respiratory failure in setting of suspected acute pulmonary edema, received IV lasix 40mg x1. Cardiology consulted for concern for heart failure.    Chart reviewed. Pt seen at bedside, admitted for acute hypoxic respiratory failure in setting of acute pulmonary congestion 2/2 receiving large volume IVF resuscitation. Currently on regular PO diet w/ good appetite, intake & tolerance per pt. NKFA, denies chew/swallow difficulty. UBW prior to pregnancy of ~167#. No cultural/ethnic/Orthodoxy food preferences provided. Labs reviewed & notable for elevated WBC, Albumin 2.3L [corrected Ca2+ WNL], bicarb 20L, improving. GFR 58L, BUN & Creat WNL [creat previously elevated, CARYN iso preeclampsia]- monitor renal function. Medications reviewed. on lasix [monitor lytes], prenatal vitamin, simethicone, bowel regimen. Education provided on general, healthful nutrition. No other nutritional concerns at this time. RDN will continue to monitor, reassess, and intervene as appropriate.     Pain: pt reported mild cramping  Skin: intact  GI: no n/v/c/d reported

## 2023-06-09 NOTE — PROGRESS NOTE ADULT - SUBJECTIVE AND OBJECTIVE BOX
INFECTIOUS DISEASES CONSULT FOLLOW-UP NOTE    INTERVAL HPI/OVERNIGHT EVENTS:    JESUS, feeling well this AM though did have fevers overnight, no new subjective concerns at this time       ROS:   Constitutional, eyes, ENT, cardiovascular, respiratory, gastrointestinal, genitourinary, integumentary, neurological, psychiatric and heme/lymph are otherwise negative other than noted above       ANTIBIOTICS/RELEVANT:    MEDICATIONS  (STANDING):  acetaminophen     Tablet .. 975 milliGRAM(s) Oral <User Schedule>  diphtheria/tetanus/pertussis (acellular) Vaccine (Adacel) 0.5 milliLiter(s) IntraMuscular once  heparin   Injectable 7500 Unit(s) SubCutaneous every 8 hours  labetalol 200 milliGRAM(s) Oral every 12 hours  piperacillin/tazobactam IVPB.. 3.375 Gram(s) IV Intermittent every 8 hours  prenatal multivitamin 1 Tablet(s) Oral daily  sodium chloride 0.9% lock flush 3 milliLiter(s) IV Push every 8 hours    MEDICATIONS  (PRN):  benzocaine 20%/menthol 0.5% Spray 1 Spray(s) Topical every 6 hours PRN for Perineal discomfort  dibucaine 1% Ointment 1 Application(s) Topical every 6 hours PRN Perineal discomfort  diphenhydrAMINE 25 milliGRAM(s) Oral every 6 hours PRN Pruritus  hydrocortisone 1% Cream 1 Application(s) Topical every 6 hours PRN Moderate Pain (4-6)  lanolin Ointment 1 Application(s) Topical every 6 hours PRN nipple soreness  magnesium hydroxide Suspension 30 milliLiter(s) Oral two times a day PRN Constipation  oxyCODONE    IR 5 milliGRAM(s) Oral once PRN Moderate to Severe Pain (4-10)  oxyCODONE    IR 5 milliGRAM(s) Oral every 3 hours PRN Moderate to Severe Pain (4-10)  pramoxine 1%/zinc 5% Cream 1 Application(s) Topical every 4 hours PRN Moderate Pain (4-6)  simethicone 80 milliGRAM(s) Chew every 4 hours PRN Gas  witch hazel Pads 1 Application(s) Topical every 4 hours PRN Perineal discomfort        Vital Signs Last 24 Hrs  T(C): 37.1 (2023 05:41), Max: 39.1 (2023 17:00)  T(F): 98.7 (2023 05:41), Max: 102.3 (2023 17:00)  HR: 86 (2023 08:00) (77 - 114)  BP: 137/73 (2023 08:00) (112/73 - 176/88)  BP(mean): 98 (2023 08:00) (77 - 121)  RR: 18 (2023 08:00) (18 - 27)  SpO2: 96% (2023 08:00) (94% - 100%)    Parameters below as of 2023 08:00  Patient On (Oxygen Delivery Method): room air        23 @ 07:01  -  23 @ 07:00  --------------------------------------------------------  IN: 450 mL / OUT: 7230 mL / NET: -6780 mL    23 @ 07:01  -  23 @ 08:24  --------------------------------------------------------  IN: 0 mL / OUT: 75 mL / NET: -75 mL      PHYSICAL EXAM:  General: NAD  HEENT: head NC/AT, no conjunctival injection, EOMI, MMM  Neck: supple, no JVD  Cardio: RRR, +S1/S2, no M/R/G  Resp: lungs CTAB, no cough/wheezes/rales/rhonchi  Abdo: soft, NT, ND, +BS, no organomegaly or palpable mass    Extremities: WWP, 1+ non-pitting edema below the knees b/l  Vasc: 2+ radial and DP pulses b/l  Neuro: A&Ox3  Psychiatric: the affect was normal        LABS:                        8.6    17.55 )-----------( 105      ( 2023 05:32 )             26.5     06-09    140  |  108  |  11  ----------------------------<  73  3.8   |  20<L>  |  1.30    Ca    7.7<L>      2023 05:32  Phos  4.5       Mg     2.2         TPro  4.5<L>  /  Alb  2.5<L>  /  TBili  0.4  /  DBili  x   /  AST  21  /  ALT  9<L>  /  AlkPhos  170<H>      PT/INR - ( 2023 05:32 )   PT: 11.5 sec;   INR: 0.97          PTT - ( 2023 05:32 )  PTT:27.9 sec  Urinalysis Basic - ( 2023 22:16 )    Color: Yellow / Appearance: Clear / S.015 / pH: x  Gluc: x / Ketone: NEGATIVE  / Bili: Negative / Urobili: 0.2 E.U./dL   Blood: x / Protein: NEGATIVE mg/dL / Nitrite: NEGATIVE   Leuk Esterase: NEGATIVE / RBC: > 10 /HPF / WBC < 5 /HPF   Sq Epi: x / Non Sq Epi: x / Bacteria: Present /HPF        MICROBIOLOGY:      RADIOLOGY & ADDITIONAL STUDIES:  Reviewed

## 2023-06-09 NOTE — DIETITIAN INITIAL EVALUATION ADULT - PERTINENT MEDS FT
MEDICATIONS  (STANDING):  acetaminophen     Tablet .. 975 milliGRAM(s) Oral <User Schedule>  diphtheria/tetanus/pertussis (acellular) Vaccine (Adacel) 0.5 milliLiter(s) IntraMuscular once  furosemide    Tablet 20 milliGRAM(s) Oral every 24 hours  heparin   Injectable 7500 Unit(s) SubCutaneous every 8 hours  labetalol 200 milliGRAM(s) Oral every 12 hours  piperacillin/tazobactam IVPB.. 3.375 Gram(s) IV Intermittent every 8 hours  polyethylene glycol 3350 17 Gram(s) Oral daily  prenatal multivitamin 1 Tablet(s) Oral daily  senna 2 Tablet(s) Oral at bedtime  sodium chloride 0.9% lock flush 3 milliLiter(s) IV Push every 8 hours    MEDICATIONS  (PRN):  benzocaine 20%/menthol 0.5% Spray 1 Spray(s) Topical every 6 hours PRN for Perineal discomfort  dibucaine 1% Ointment 1 Application(s) Topical every 6 hours PRN Perineal discomfort  diphenhydrAMINE 25 milliGRAM(s) Oral every 6 hours PRN Pruritus  hydrocortisone 1% Cream 1 Application(s) Topical every 6 hours PRN Moderate Pain (4-6)  lanolin Ointment 1 Application(s) Topical every 6 hours PRN nipple soreness  magnesium hydroxide Suspension 30 milliLiter(s) Oral two times a day PRN Constipation  oxyCODONE    IR 5 milliGRAM(s) Oral once PRN Moderate to Severe Pain (4-10)  oxyCODONE    IR 5 milliGRAM(s) Oral every 3 hours PRN Moderate to Severe Pain (4-10)  pramoxine 1%/zinc 5% Cream 1 Application(s) Topical every 4 hours PRN Moderate Pain (4-6)  simethicone 80 milliGRAM(s) Chew every 4 hours PRN Gas  witch hazel Pads 1 Application(s) Topical every 4 hours PRN Perineal discomfort

## 2023-06-09 NOTE — CONSULT NOTE ADULT - ASSESSMENT
31yo  woman w/ hx HTN initially admitted to OB L&D on  for induction of labor s/p vaginal delivery  c/b preeclampsia w/ severe features and sepsis in setting of chorioamnionitis and GNR bacteremia 2/2 E.coli. Cardiology consulted given abnormal echocardiogram.     #HFpEF  Patient with signs/symptoms of pulmonary edema, currently much improved. No history of cardiomyopathy or prior cardiac work up, or concerning family history  EKG 23: NSR  TTE 23: EF 50-55%, G2DD, dilated LA, moderate MR, PASP 44  Recommendations:    - Preeclampsia is known to be associated with development of diastolic dysfunction, which is likely the trigger here. Stress induced CM is also a possibility here, but no suspicion for peripartum cardiomyopathy as EF is generally <40-45%   - Would recommend lasix 20mg PO QD   - daily weights, strict I/O      HTN  BPs currently 130s/80, well controlled   - Continue labetalol 200mg BID       Case d/w Dr. Vasu Waddell [facility] Cardiology

## 2023-06-10 LAB
-  AMPICILLIN/SULBACTAM: SIGNIFICANT CHANGE UP
-  AMPICILLIN: SIGNIFICANT CHANGE UP
-  CEFAZOLIN: SIGNIFICANT CHANGE UP
-  CEFTRIAXONE: SIGNIFICANT CHANGE UP
-  CIPROFLOXACIN: SIGNIFICANT CHANGE UP
-  ERTAPENEM: SIGNIFICANT CHANGE UP
-  GENTAMICIN: SIGNIFICANT CHANGE UP
-  PIPERACILLIN/TAZOBACTAM: SIGNIFICANT CHANGE UP
-  TOBRAMYCIN: SIGNIFICANT CHANGE UP
-  TRIMETHOPRIM/SULFAMETHOXAZOLE: SIGNIFICANT CHANGE UP
ALBUMIN SERPL ELPH-MCNC: 2.4 G/DL — LOW (ref 3.3–5)
ALP SERPL-CCNC: 149 U/L — HIGH (ref 40–120)
ALT FLD-CCNC: 8 U/L — LOW (ref 10–45)
ANION GAP SERPL CALC-SCNC: 11 MMOL/L — SIGNIFICANT CHANGE UP (ref 5–17)
ANION GAP SERPL CALC-SCNC: 9 MMOL/L — SIGNIFICANT CHANGE UP (ref 5–17)
AST SERPL-CCNC: 16 U/L — SIGNIFICANT CHANGE UP (ref 10–40)
BASOPHILS # BLD AUTO: 0.04 K/UL — SIGNIFICANT CHANGE UP (ref 0–0.2)
BASOPHILS NFR BLD AUTO: 0.3 % — SIGNIFICANT CHANGE UP (ref 0–2)
BILIRUB SERPL-MCNC: 0.5 MG/DL — SIGNIFICANT CHANGE UP (ref 0.2–1.2)
BUN SERPL-MCNC: 9 MG/DL — SIGNIFICANT CHANGE UP (ref 7–23)
BUN SERPL-MCNC: 9 MG/DL — SIGNIFICANT CHANGE UP (ref 7–23)
CALCIUM SERPL-MCNC: 8.4 MG/DL — SIGNIFICANT CHANGE UP (ref 8.4–10.5)
CALCIUM SERPL-MCNC: 8.7 MG/DL — SIGNIFICANT CHANGE UP (ref 8.4–10.5)
CHLORIDE SERPL-SCNC: 111 MMOL/L — HIGH (ref 96–108)
CHLORIDE SERPL-SCNC: 112 MMOL/L — HIGH (ref 96–108)
CO2 SERPL-SCNC: 19 MMOL/L — LOW (ref 22–31)
CO2 SERPL-SCNC: 21 MMOL/L — LOW (ref 22–31)
CREAT SERPL-MCNC: 1.1 MG/DL — SIGNIFICANT CHANGE UP (ref 0.5–1.3)
CREAT SERPL-MCNC: 1.1 MG/DL — SIGNIFICANT CHANGE UP (ref 0.5–1.3)
EGFR: 68 ML/MIN/1.73M2 — SIGNIFICANT CHANGE UP
EGFR: 68 ML/MIN/1.73M2 — SIGNIFICANT CHANGE UP
EOSINOPHIL # BLD AUTO: 0.11 K/UL — SIGNIFICANT CHANGE UP (ref 0–0.5)
EOSINOPHIL NFR BLD AUTO: 0.9 % — SIGNIFICANT CHANGE UP (ref 0–6)
GLUCOSE SERPL-MCNC: 78 MG/DL — SIGNIFICANT CHANGE UP (ref 70–99)
GLUCOSE SERPL-MCNC: 85 MG/DL — SIGNIFICANT CHANGE UP (ref 70–99)
HCT VFR BLD CALC: 26.8 % — LOW (ref 34.5–45)
HGB BLD-MCNC: 8.3 G/DL — LOW (ref 11.5–15.5)
IMM GRANULOCYTES NFR BLD AUTO: 0.7 % — SIGNIFICANT CHANGE UP (ref 0–0.9)
LYMPHOCYTES # BLD AUTO: 2.85 K/UL — SIGNIFICANT CHANGE UP (ref 1–3.3)
LYMPHOCYTES # BLD AUTO: 23.7 % — SIGNIFICANT CHANGE UP (ref 13–44)
MAGNESIUM SERPL-MCNC: 1.9 MG/DL — SIGNIFICANT CHANGE UP (ref 1.6–2.6)
MCHC RBC-ENTMCNC: 26.9 PG — LOW (ref 27–34)
MCHC RBC-ENTMCNC: 31 GM/DL — LOW (ref 32–36)
MCV RBC AUTO: 87 FL — SIGNIFICANT CHANGE UP (ref 80–100)
METHOD TYPE: SIGNIFICANT CHANGE UP
MONOCYTES # BLD AUTO: 0.67 K/UL — SIGNIFICANT CHANGE UP (ref 0–0.9)
MONOCYTES NFR BLD AUTO: 5.6 % — SIGNIFICANT CHANGE UP (ref 2–14)
NEUTROPHILS # BLD AUTO: 8.27 K/UL — HIGH (ref 1.8–7.4)
NEUTROPHILS NFR BLD AUTO: 68.8 % — SIGNIFICANT CHANGE UP (ref 43–77)
NRBC # BLD: 0 /100 WBCS — SIGNIFICANT CHANGE UP (ref 0–0)
PHOSPHATE SERPL-MCNC: 4.1 MG/DL — SIGNIFICANT CHANGE UP (ref 2.5–4.5)
PLATELET # BLD AUTO: 123 K/UL — LOW (ref 150–400)
POTASSIUM SERPL-MCNC: 3.8 MMOL/L — SIGNIFICANT CHANGE UP (ref 3.5–5.3)
POTASSIUM SERPL-MCNC: 4.2 MMOL/L — SIGNIFICANT CHANGE UP (ref 3.5–5.3)
POTASSIUM SERPL-SCNC: 3.8 MMOL/L — SIGNIFICANT CHANGE UP (ref 3.5–5.3)
POTASSIUM SERPL-SCNC: 4.2 MMOL/L — SIGNIFICANT CHANGE UP (ref 3.5–5.3)
PROT SERPL-MCNC: 5.1 G/DL — LOW (ref 6–8.3)
RBC # BLD: 3.08 M/UL — LOW (ref 3.8–5.2)
RBC # FLD: 19.5 % — HIGH (ref 10.3–14.5)
SODIUM SERPL-SCNC: 140 MMOL/L — SIGNIFICANT CHANGE UP (ref 135–145)
SODIUM SERPL-SCNC: 143 MMOL/L — SIGNIFICANT CHANGE UP (ref 135–145)
WBC # BLD: 12.02 K/UL — HIGH (ref 3.8–10.5)
WBC # FLD AUTO: 12.02 K/UL — HIGH (ref 3.8–10.5)

## 2023-06-10 PROCEDURE — 99233 SBSQ HOSP IP/OBS HIGH 50: CPT

## 2023-06-10 PROCEDURE — 99232 SBSQ HOSP IP/OBS MODERATE 35: CPT

## 2023-06-10 RX ORDER — IRON SUCROSE 20 MG/ML
200 INJECTION, SOLUTION INTRAVENOUS EVERY 24 HOURS
Refills: 0 | Status: COMPLETED | OUTPATIENT
Start: 2023-06-10 | End: 2023-06-14

## 2023-06-10 RX ORDER — FUROSEMIDE 40 MG
10 TABLET ORAL ONCE
Refills: 0 | Status: COMPLETED | OUTPATIENT
Start: 2023-06-10 | End: 2023-06-10

## 2023-06-10 RX ORDER — LABETALOL HCL 100 MG
200 TABLET ORAL EVERY 8 HOURS
Refills: 0 | Status: DISCONTINUED | OUTPATIENT
Start: 2023-06-10 | End: 2023-06-12

## 2023-06-10 RX ORDER — POTASSIUM CHLORIDE 20 MEQ
40 PACKET (EA) ORAL ONCE
Refills: 0 | Status: COMPLETED | OUTPATIENT
Start: 2023-06-10 | End: 2023-06-10

## 2023-06-10 RX ORDER — CEFTRIAXONE 500 MG/1
2000 INJECTION, POWDER, FOR SOLUTION INTRAMUSCULAR; INTRAVENOUS EVERY 24 HOURS
Refills: 0 | Status: DISCONTINUED | OUTPATIENT
Start: 2023-06-10 | End: 2023-06-16

## 2023-06-10 RX ADMIN — Medication 975 MILLIGRAM(S): at 15:46

## 2023-06-10 RX ADMIN — SODIUM CHLORIDE 3 MILLILITER(S): 9 INJECTION INTRAMUSCULAR; INTRAVENOUS; SUBCUTANEOUS at 22:00

## 2023-06-10 RX ADMIN — Medication 40 MILLIEQUIVALENT(S): at 14:04

## 2023-06-10 RX ADMIN — Medication 1 TABLET(S): at 12:35

## 2023-06-10 RX ADMIN — Medication 1 SPRAY(S): at 03:44

## 2023-06-10 RX ADMIN — SIMETHICONE 80 MILLIGRAM(S): 80 TABLET, CHEWABLE ORAL at 05:57

## 2023-06-10 RX ADMIN — Medication 10 MILLIGRAM(S): at 14:05

## 2023-06-10 RX ADMIN — Medication 1 APPLICATION(S): at 03:45

## 2023-06-10 RX ADMIN — PIPERACILLIN AND TAZOBACTAM 25 GRAM(S): 4; .5 INJECTION, POWDER, LYOPHILIZED, FOR SOLUTION INTRAVENOUS at 11:01

## 2023-06-10 RX ADMIN — Medication 975 MILLIGRAM(S): at 21:07

## 2023-06-10 RX ADMIN — PIPERACILLIN AND TAZOBACTAM 25 GRAM(S): 4; .5 INJECTION, POWDER, LYOPHILIZED, FOR SOLUTION INTRAVENOUS at 03:57

## 2023-06-10 RX ADMIN — Medication 975 MILLIGRAM(S): at 03:46

## 2023-06-10 RX ADMIN — Medication 975 MILLIGRAM(S): at 09:14

## 2023-06-10 RX ADMIN — IRON SUCROSE 110 MILLIGRAM(S): 20 INJECTION, SOLUTION INTRAVENOUS at 15:45

## 2023-06-10 RX ADMIN — Medication 200 MILLIGRAM(S): at 05:45

## 2023-06-10 RX ADMIN — HEPARIN SODIUM 7500 UNIT(S): 5000 INJECTION INTRAVENOUS; SUBCUTANEOUS at 14:51

## 2023-06-10 RX ADMIN — HEPARIN SODIUM 7500 UNIT(S): 5000 INJECTION INTRAVENOUS; SUBCUTANEOUS at 22:32

## 2023-06-10 RX ADMIN — CEFTRIAXONE 100 MILLIGRAM(S): 500 INJECTION, POWDER, FOR SOLUTION INTRAMUSCULAR; INTRAVENOUS at 18:00

## 2023-06-10 RX ADMIN — HEPARIN SODIUM 7500 UNIT(S): 5000 INJECTION INTRAVENOUS; SUBCUTANEOUS at 05:46

## 2023-06-10 RX ADMIN — Medication 200 MILLIGRAM(S): at 22:31

## 2023-06-10 RX ADMIN — Medication 200 MILLIGRAM(S): at 13:07

## 2023-06-10 NOTE — PROGRESS NOTE ADULT - SUBJECTIVE AND OBJECTIVE BOX
INTERVAL EVENTS:    PAST MEDICAL & SURGICAL HISTORY:      MEDICATIONS  (STANDING):  acetaminophen     Tablet .. 975 milliGRAM(s) Oral <User Schedule>  diphtheria/tetanus/pertussis (acellular) Vaccine (Adacel) 0.5 milliLiter(s) IntraMuscular once  furosemide    Tablet 20 milliGRAM(s) Oral every 24 hours  heparin   Injectable 7500 Unit(s) SubCutaneous every 8 hours  labetalol 200 milliGRAM(s) Oral every 12 hours  piperacillin/tazobactam IVPB.. 3.375 Gram(s) IV Intermittent every 8 hours  polyethylene glycol 3350 17 Gram(s) Oral daily  prenatal multivitamin 1 Tablet(s) Oral daily  senna 2 Tablet(s) Oral at bedtime  sodium chloride 0.9% lock flush 3 milliLiter(s) IV Push every 8 hours    MEDICATIONS  (PRN):  benzocaine 20%/menthol 0.5% Spray 1 Spray(s) Topical every 6 hours PRN for Perineal discomfort  dibucaine 1% Ointment 1 Application(s) Topical every 6 hours PRN Perineal discomfort  diphenhydrAMINE 25 milliGRAM(s) Oral every 6 hours PRN Pruritus  hydrocortisone 1% Cream 1 Application(s) Topical every 6 hours PRN Moderate Pain (4-6)  lanolin Ointment 1 Application(s) Topical every 6 hours PRN nipple soreness  magnesium hydroxide Suspension 30 milliLiter(s) Oral two times a day PRN Constipation  oxyCODONE    IR 5 milliGRAM(s) Oral once PRN Moderate to Severe Pain (4-10)  oxyCODONE    IR 5 milliGRAM(s) Oral every 3 hours PRN Moderate to Severe Pain (4-10)  pramoxine 1%/zinc 5% Cream 1 Application(s) Topical every 4 hours PRN Moderate Pain (4-6)  simethicone 80 milliGRAM(s) Chew every 4 hours PRN Gas  witch hazel Pads 1 Application(s) Topical every 4 hours PRN Perineal discomfort    T(F): 97.9 (06-10-23 @ 06:00), Max: 98.5 (06-09-23 @ 14:18)  HR: 80 (06-10-23 @ 06:00) (80 - 96)  BP: 156/83 (06-10-23 @ 06:00) (120/68 - 156/83)  BP(mean): 100 (06-09-23 @ 17:00) (88 - 107)  ABP: --  ABP(mean): --  RR: 18 (06-10-23 @ 06:00) (16 - 29)  SpO2: 95% (06-10-23 @ 06:00) (95% - 100%)    I/O Detail 24H    08 Jun 2023 07:01  -  09 Jun 2023 07:00  --------------------------------------------------------  IN:    IV PiggyBack: 150 mL    Magnesium Sulfate: 100 mL    Oral Fluid: 200 mL  Total IN: 450 mL    OUT:    Drain (mL): 100 mL    Indwelling Catheter - Urethral (mL): 7130 mL  Total OUT: 7230 mL    Total NET: -6780 mL      09 Jun 2023 07:01  -  10 Vincent 2023 06:54  --------------------------------------------------------  IN:    IV PiggyBack: 100 mL    Oral Fluid: 1050 mL  Total IN: 1150 mL    OUT:    Indwelling Catheter - Urethral (mL): 3825 mL    Voided (mL): 300 mL  Total OUT: 4125 mL    Total NET: -2975 mL          PHYSICAL EXAM:  GEN: NAD  HEENT: EOMI   RESP: CTA b/l  CV: RRR. Normal S1/S2. No m/r/g.  ABD: soft, non-distended  EXT: No edema   NEURO: alert and attentive    LABS:  CBC 06-09-23 @ 10:55                        8.4    16.89 )-----------( 101                   26.3       Hgb trend: 8.4 <-- , 8.6 <-- , 8.9 <-- , 10.2 <-- , 9.4 <-- , 9.7 <-- , 10.2 <-- , 10.5 <-- , 10.2 <--   WBC trend: 16.89 <-- , 17.55 <-- , 18.69 <-- , 20.71 <-- , 25.71 <-- , 20.28 <-- , 17.09 <-- , 11.79 <-- , 9.64 <--       CMP 06-09-23 @ 10:55    137  |  108  |  11  ----------------------------<  97  4.2   |  20<L>  |  1.27    Ca    7.9<L>      06-09-23 @ 10:55  Phos  4.2     06-09  Mg     2.0     06-09    TPro  5.1<L>  /  Alb  2.3<L>  /  TBili  0.5  /  DBili  x   /  AST  18  /  ALT  7<L>  /  AlkPhos  160<H>  06-09      Serum Cr trend: 1.27 <-- , 1.30 <-- , 1.38 <-- , 1.41 <-- , 1.23 <-- , 1.25 <-- , 1.07 <-- , 1.02 <--     PT/INR - ( 09 Jun 2023 10:55 )   PT: 11.9 sec;   INR: 1.00          PTT - ( 09 Jun 2023 10:55 ):28.4 sec    Cardiac Markers           STUDIES:           INTERVAL EVENTS: no acute events, patient with some more increased swelling     PAST MEDICAL & SURGICAL HISTORY:      MEDICATIONS  (STANDING):  acetaminophen     Tablet .. 975 milliGRAM(s) Oral <User Schedule>  diphtheria/tetanus/pertussis (acellular) Vaccine (Adacel) 0.5 milliLiter(s) IntraMuscular once  furosemide    Tablet 20 milliGRAM(s) Oral every 24 hours  heparin   Injectable 7500 Unit(s) SubCutaneous every 8 hours  labetalol 200 milliGRAM(s) Oral every 12 hours  piperacillin/tazobactam IVPB.. 3.375 Gram(s) IV Intermittent every 8 hours  polyethylene glycol 3350 17 Gram(s) Oral daily  prenatal multivitamin 1 Tablet(s) Oral daily  senna 2 Tablet(s) Oral at bedtime  sodium chloride 0.9% lock flush 3 milliLiter(s) IV Push every 8 hours    MEDICATIONS  (PRN):  benzocaine 20%/menthol 0.5% Spray 1 Spray(s) Topical every 6 hours PRN for Perineal discomfort  dibucaine 1% Ointment 1 Application(s) Topical every 6 hours PRN Perineal discomfort  diphenhydrAMINE 25 milliGRAM(s) Oral every 6 hours PRN Pruritus  hydrocortisone 1% Cream 1 Application(s) Topical every 6 hours PRN Moderate Pain (4-6)  lanolin Ointment 1 Application(s) Topical every 6 hours PRN nipple soreness  magnesium hydroxide Suspension 30 milliLiter(s) Oral two times a day PRN Constipation  oxyCODONE    IR 5 milliGRAM(s) Oral once PRN Moderate to Severe Pain (4-10)  oxyCODONE    IR 5 milliGRAM(s) Oral every 3 hours PRN Moderate to Severe Pain (4-10)  pramoxine 1%/zinc 5% Cream 1 Application(s) Topical every 4 hours PRN Moderate Pain (4-6)  simethicone 80 milliGRAM(s) Chew every 4 hours PRN Gas  witch hazel Pads 1 Application(s) Topical every 4 hours PRN Perineal discomfort    T(F): 97.9 (06-10-23 @ 06:00), Max: 98.5 (06-09-23 @ 14:18)  HR: 80 (06-10-23 @ 06:00) (80 - 96)  BP: 156/83 (06-10-23 @ 06:00) (120/68 - 156/83)  BP(mean): 100 (06-09-23 @ 17:00) (88 - 107)  ABP: --  ABP(mean): --  RR: 18 (06-10-23 @ 06:00) (16 - 29)  SpO2: 95% (06-10-23 @ 06:00) (95% - 100%)    I/O Detail 24H    08 Jun 2023 07:01  -  09 Jun 2023 07:00  --------------------------------------------------------  IN:    IV PiggyBack: 150 mL    Magnesium Sulfate: 100 mL    Oral Fluid: 200 mL  Total IN: 450 mL    OUT:    Drain (mL): 100 mL    Indwelling Catheter - Urethral (mL): 7130 mL  Total OUT: 7230 mL    Total NET: -6780 mL      09 Jun 2023 07:01  -  10 Vincent 2023 06:54  --------------------------------------------------------  IN:    IV PiggyBack: 100 mL    Oral Fluid: 1050 mL  Total IN: 1150 mL    OUT:    Indwelling Catheter - Urethral (mL): 3825 mL    Voided (mL): 300 mL  Total OUT: 4125 mL    Total NET: -2975 mL          PHYSICAL EXAM:  GEN: NAD  HEENT: EOMI   RESP: CTA b/l  CV: RRR. Normal S1/S2. No m/r/g.  ABD: soft, non-distended  EXT: ankle edema  NEURO: alert and attentive    LABS:  CBC 06-09-23 @ 10:55                        8.4    16.89 )-----------( 101                   26.3       Hgb trend: 8.4 <-- , 8.6 <-- , 8.9 <-- , 10.2 <-- , 9.4 <-- , 9.7 <-- , 10.2 <-- , 10.5 <-- , 10.2 <--   WBC trend: 16.89 <-- , 17.55 <-- , 18.69 <-- , 20.71 <-- , 25.71 <-- , 20.28 <-- , 17.09 <-- , 11.79 <-- , 9.64 <--       CMP 06-09-23 @ 10:55    137  |  108  |  11  ----------------------------<  97  4.2   |  20<L>  |  1.27    Ca    7.9<L>      06-09-23 @ 10:55  Phos  4.2     06-09  Mg     2.0     06-09    TPro  5.1<L>  /  Alb  2.3<L>  /  TBili  0.5  /  DBili  x   /  AST  18  /  ALT  7<L>  /  AlkPhos  160<H>  06-09      Serum Cr trend: 1.27 <-- , 1.30 <-- , 1.38 <-- , 1.41 <-- , 1.23 <-- , 1.25 <-- , 1.07 <-- , 1.02 <--     PT/INR - ( 09 Jun 2023 10:55 )   PT: 11.9 sec;   INR: 1.00          PTT - ( 09 Jun 2023 10:55 ):28.4 sec    Cardiac Markers           STUDIES:

## 2023-06-10 NOTE — PROGRESS NOTE ADULT - ASSESSMENT
31yo  woman w/ hx HTN initially admitted to OB L&D on  for induction of labor s/p vaginal delivery  c/b preeclampsia w/ severe features and sepsis in setting of chorioamnionitis and GNR bacteremia 2/2 E.coli. Cardiology consulted given abnormal echocardiogram.     #HFpEF  Patient with signs/symptoms of pulmonary edema, currently much improved. No history of cardiomyopathy or prior cardiac work up, or concerning family history  EKG 23: NSR  TTE 23: EF 50-55%, G2DD, dilated LA, moderate MR, PASP 44  Recommendations:    - Preeclampsia is known to be associated with development of diastolic dysfunction, which is likely the trigger here. Stress induced CM is also a possibility here, but no suspicion for peripartum cardiomyopathy as EF is generally <40-45%   -    - daily weights, strict I/O      HTN  BPs currently 130s/80, well controlled   - Continue labetalol 200mg BID       Case d/w Dr. Vasu Waddell [facility] Cardiology 31yo  woman w/ hx HTN initially admitted to OB L&D on  for induction of labor s/p vaginal delivery  c/b preeclampsia w/ severe features and sepsis in setting of chorioamnionitis and GNR bacteremia 2/2 E.coli. Cardiology consulted given abnormal echocardiogram.     #HFpEF  Patient with signs/symptoms of pulmonary edema, currently much improved. No history of cardiomyopathy or prior cardiac work up, or concerning family history  EKG 23: NSR  TTE 23: EF 50-55%, G2DD, dilated LA, moderate MR, PASP 44  Recommendations:    - Preeclampsia is known to be associated with development of diastolic dysfunction, which is likely the trigger here. Stress induced CM is also a possibility here, but no suspicion for peripartum cardiomyopathy as EF is generally <40-45%   - Please give IV lasix 10mg x1 with 40meq PO potassium today; please repeat BMP in afternoon   - Agree with increasing PO labetalol to 200mg TID, goal BP <140/90   - daily weights, strict I/O          Case d/w attending  Nad[facility] Cardiology

## 2023-06-10 NOTE — PROGRESS NOTE ADULT - SUBJECTIVE AND OBJECTIVE BOX
Patient evaluated at bedside this morning, resting comfortable in bed, no acute events overnight.  She reports pain is well controlled with tylenol and motrin.  She denies headache, vision change,s dizziness, chest pain, palpitations, shortness of breath, nausea, vomiting, fever, chills, RUQ/epigastric pain, heavy vaginal bleeding. She has been ambulating without assistance.   Tolerating food well, without nausea/vomit.      Physical Exam:  T(C): 36.6 (06-10-23 @ 12:50), Max: 36.7 (06-10-23 @ 10:00)  HR: 87 (06-10-23 @ 12:50) (80 - 92)  BP: 147/90 (06-10-23 @ 12:50) (124/79 - 156/83)  RR: 18 (06-10-23 @ 12:50) (18 - 18)  SpO2: 96% (06-10-23 @ 12:50) (95% - 96%)    GA: NAD, A&O x 3  Pulm: no increased work of breathing  Abd: soft, nontender, nondistended, no rebound or guarding, uterus firm.  Extremities: no swelling or calf tenderness                          8.3    12.02 )-----------( 123      ( 10 Vincent 2023 07:45 )             26.8     06-10    140  |  112<H>  |  9   ----------------------------<  78  3.8   |  19<L>  |  1.10    Ca    8.4      10 Vincent 2023 07:45  Phos  4.1     06-10  Mg     1.9     06-10    TPro  5.1<L>  /  Alb  2.4<L>  /  TBili  0.5  /  DBili  x   /  AST  16  /  ALT  8<L>  /  AlkPhos  149<H>  06-10    acetaminophen     Tablet .. 975 milliGRAM(s) Oral <User Schedule>  benzocaine 20%/menthol 0.5% Spray 1 Spray(s) Topical every 6 hours PRN  dibucaine 1% Ointment 1 Application(s) Topical every 6 hours PRN  diphenhydrAMINE 25 milliGRAM(s) Oral every 6 hours PRN  diphtheria/tetanus/pertussis (acellular) Vaccine (Adacel) 0.5 milliLiter(s) IntraMuscular once  furosemide   Injectable 10 milliGRAM(s) IV Push once  heparin   Injectable 7500 Unit(s) SubCutaneous every 8 hours  hydrocortisone 1% Cream 1 Application(s) Topical every 6 hours PRN  iron sucrose IVPB 200 milliGRAM(s) IV Intermittent every 24 hours  labetalol 200 milliGRAM(s) Oral every 8 hours  lanolin Ointment 1 Application(s) Topical every 6 hours PRN  magnesium hydroxide Suspension 30 milliLiter(s) Oral two times a day PRN  oxyCODONE    IR 5 milliGRAM(s) Oral once PRN  oxyCODONE    IR 5 milliGRAM(s) Oral every 3 hours PRN  piperacillin/tazobactam IVPB.. 3.375 Gram(s) IV Intermittent every 8 hours  polyethylene glycol 3350 17 Gram(s) Oral daily  potassium chloride    Tablet ER 40 milliEquivalent(s) Oral once  pramoxine 1%/zinc 5% Cream 1 Application(s) Topical every 4 hours PRN  prenatal multivitamin 1 Tablet(s) Oral daily  senna 2 Tablet(s) Oral at bedtime  simethicone 80 milliGRAM(s) Chew every 4 hours PRN  sodium chloride 0.9% lock flush 3 milliLiter(s) IV Push every 8 hours  witch hazel Pads 1 Application(s) Topical every 4 hours PRN

## 2023-06-10 NOTE — PROGRESS NOTE ADULT - ASSESSMENT
A/P 32y s/p , PPD#2, c/b Sepsis, PPH, PEC w SF, Pulm HTN, Diastolic Dysfunction, CARYN, stable, meeting postpartum milestones   #Sepsis  - F/u BCx -  Ecoli, pending susceptibilities  NGx1 day  - Continue Zosyn   - Appreciate ID recs    # PPH  - s/p 2u pRBCs  - s/p NIKKI, double pit, TXA x2, hemabate, cytotec 1000 x 2  - Hb stable at 8.3 from 8.4 yesterday    #PEC w SF  - s/p IV Mg x 12 hr  - Denying toxic symptoms  - Mild range BPS --> labetalol increased from 200 BID to 200 TID today    #Pulm HTN/Diastolic Dysfunction  - asymptomatic  - likely 2/ PEC  - Give 1 dose IV lasix 10 mg w/ 40 mEq PO K+ today per Cardiology recs  - Cardiology following    #CARYN  - Cr improving  - Removed kaba this AM and patient voiding    #PP Care  - Pain: well controlled on tylenol/motrin  - GI: Tolerating regular diet  - : urinating without difficulty/pain  - DVT prophylaxis: ambulating frequently  - Dispo: PPD 2, unless otherwise specified

## 2023-06-10 NOTE — PROGRESS NOTE ADULT - ASSESSMENT
31 yo F s/p vaginal delivery 6/7 c/b chorioamnionitis with E coli bacteremia, post-partum hemorrhage, pulmonary edema.  Clinically continuing to improve, no further fever, still mild lower abd tenderness, WBC continuing to trend down (12K today <--16.9).  E coli is susceptible to ceftriaxone.  Would de-escalate.  Suggest:  - Continue to f/u blood culture from 6/8  - Start ceftriaxone 2 g IV q24h  - D/C pip-tazo  Recommendations discussed with primary team - will follow with you - team 1.

## 2023-06-10 NOTE — PROGRESS NOTE ADULT - SUBJECTIVE AND OBJECTIVE BOX
INTERVAL HPI/OVERNIGHT EVENTS:  Afebrile, continues to feel better.  Some dyspnea     CONSTITUTIONAL:  No fever, chills, night sweats  EYES:  No photophobia or visual changes  CARDIOVASCULAR:  No chest pain  RESPIRATORY:  No cough, wheezing, or SOB   GASTROINTESTINAL:  No nausea, vomiting, diarrhea, constipation, or abdominal pain  GENITOURINARY:  No frequency, urgency, dysuria or hematuria  NEUROLOGIC:  No headache, lightheadedness      ANTIBIOTICS/RELEVANT:          Vital Signs Last 24 Hrs  T(C): 36.6 (10 Vincent 2023 14:00), Max: 36.8 (2023 17:50)  T(F): 97.9 (10 Vincent 2023 14:00), Max: 98.2 (2023 17:50)  HR: 82 (10 Vincent 2023 14:00) (80 - 92)  BP: 138/88 (10 Vincent 2023 14:00) (124/79 - 156/83)  BP(mean): 105 (10 Vincent 2023 14:00) (100 - 107)  RR: 18 (10 Vincent 2023 14:00) (16 - 29)  SpO2: 97% (10 Vincent 2023 14:00) (95% - 99%)    Parameters below as of 10 Vincent 2023 14:00  Patient On (Oxygen Delivery Method): room air        PHYSICAL EXAM:  Constitutional:  Alert  Eyes:  Sclerae anicteric, conjunctivae clear, PERRL  Ear/Nose/Throat:  No nasal exudate or sinus tenderness;  No buccal mucosal lesions, no pharyngeal erythema or exudate	  Neck:  Supple, no adenopathy  Respiratory:  Clear bilaterally  Cardiovascular:  RRR, S1S2, no murmur appreciated  Gastrointestinal:  Symmetric, normoactive BS, soft, NT, no masses, guarding or rebound.  No HSM  Extremities:  No edema      LABS:                        8.3    12.02 )-----------( 123      ( 10 Vincent 2023 07:45 )             26.8         06-10    140  |  112<H>  |  9   ----------------------------<  78  3.8   |  19<L>  |  1.10    Ca    8.4      10 Vincent 2023 07:45  Phos  4.1     06-10  Mg     1.9     06-10    TPro  5.1<L>  /  Alb  2.4<L>  /  TBili  0.5  /  DBili  x   /  AST  16  /  ALT  8<L>  /  AlkPhos  149<H>  06-10      Urinalysis Basic - ( 2023 22:16 )    Color: Yellow / Appearance: Clear / S.015 / pH: x  Gluc: x / Ketone: NEGATIVE  / Bili: Negative / Urobili: 0.2 E.U./dL   Blood: x / Protein: NEGATIVE mg/dL / Nitrite: NEGATIVE   Leuk Esterase: NEGATIVE / RBC: > 10 /HPF / WBC < 5 /HPF   Sq Epi: x / Non Sq Epi: x / Bacteria: Present /HPF        MICROBIOLOGY:        RADIOLOGY & ADDITIONAL STUDIES: INTERVAL HPI/OVERNIGHT EVENTS:  Afebrile, continues to feel better.  Some dyspnea while breastfeeding, now resolved.  Lower abd cramping, changing vaginal pads q2-3 h.    CONSTITUTIONAL:  No fever, chills, night sweats  EYES:  No photophobia or visual changes  CARDIOVASCULAR:  No chest pain  RESPIRATORY:  No cough, wheezing, or SOB   GASTROINTESTINAL:  No nausea, vomiting, diarrhea, constipation.  Soft stool  GENITOURINARY:  No frequency, urgency, dysuria or hematuria  NEUROLOGIC:  No headache, lightheadedness      ANTIBIOTICS/RELEVANT:  Pip-tazo 3.375 g IV q8h via EI -present    Amp   Cefazolin   Ceftriaxone   Clinda -  Gent -    Vital Signs Last 24 Hrs  T(C): 36.6 (10 Vincent 2023 14:00), Max: 36.8 (2023 17:50)  T(F): 97.9 (10 Vincent 2023 14:00), Max: 98.2 (2023 17:50)  HR: 82 (10 Vincent 2023 14:00) (80 - 92)  BP: 138/88 (10 Vincent 2023 14:00) (124/79 - 156/83)  BP(mean): 105 (10 Vincent 2023 14:00) (100 - 107)  RR: 18 (10 Vincent 2023 14:00) (16 - 29)  SpO2: 97% (10 Vincent 2023 14:00) (95% - 99%)    Parameters below as of 10 Vincent 2023 14:00  Patient On (Oxygen Delivery Method): room air        PHYSICAL EXAM:  Constitutional:  Alert  Eyes:  Sclerae anicteric, conjunctivae clear, PERRL  Ear/Nose/Throat:  No nasal exudate or sinus tenderness;  No buccal mucosal lesions, no pharyngeal erythema or exudate	  Neck:  Supple, no adenopathy  Respiratory:  Clear bilaterally  Cardiovascular:  RRR, S1S2, no murmur appreciated  Gastrointestinal:  Symmetric, normoactive BS, soft, mild lower abd tenderness to palpation, no guarding or rebound.  No HSM  Extremities:  No edema      LABS:                        8.3    12.02 )-----------( 123      ( 10 Vincent 2023 07:45 )             26.8         06-10    140  |  112<H>  |  9   ----------------------------<  78  3.8   |  19<L>  |  1.10    Ca    8.4      10 Vincent 2023 07:45  Phos  4.1     06-10  Mg     1.9     06-10    TPro  5.1<L>  /  Alb  2.4<L>  /  TBili  0.5  /  DBili  x   /  AST  16  /  ALT  8<L>  /  AlkPhos  149<H>  06-10      Urinalysis Basic - ( 2023 22:16 )    Color: Yellow / Appearance: Clear / S.015 / pH: x  Gluc: x / Ketone: NEGATIVE  / Bili: Negative / Urobili: 0.2 E.U./dL   Blood: x / Protein: NEGATIVE mg/dL / Nitrite: NEGATIVE   Leuk Esterase: NEGATIVE / RBC: > 10 /HPF / WBC < 5 /HPF   Sq Epi: x / Non Sq Epi: x / Bacteria: Present /HPF        MICROBIOLOGY:    Blood cultures:  6/7 X 2 - E coli ();   - NGTD    RADIOLOGY & ADDITIONAL STUDIES:

## 2023-06-11 LAB
ALBUMIN SERPL ELPH-MCNC: 2.9 G/DL — LOW (ref 3.3–5)
ALP SERPL-CCNC: 196 U/L — HIGH (ref 40–120)
ALT FLD-CCNC: 12 U/L — SIGNIFICANT CHANGE UP (ref 10–45)
ANION GAP SERPL CALC-SCNC: 14 MMOL/L — SIGNIFICANT CHANGE UP (ref 5–17)
AST SERPL-CCNC: 28 U/L — SIGNIFICANT CHANGE UP (ref 10–40)
BASOPHILS # BLD AUTO: 0.04 K/UL — SIGNIFICANT CHANGE UP (ref 0–0.2)
BASOPHILS NFR BLD AUTO: 0.4 % — SIGNIFICANT CHANGE UP (ref 0–2)
BILIRUB SERPL-MCNC: 0.5 MG/DL — SIGNIFICANT CHANGE UP (ref 0.2–1.2)
BUN SERPL-MCNC: 8 MG/DL — SIGNIFICANT CHANGE UP (ref 7–23)
CALCIUM SERPL-MCNC: 8.7 MG/DL — SIGNIFICANT CHANGE UP (ref 8.4–10.5)
CHLORIDE SERPL-SCNC: 115 MMOL/L — HIGH (ref 96–108)
CO2 SERPL-SCNC: 17 MMOL/L — LOW (ref 22–31)
CREAT SERPL-MCNC: 1.02 MG/DL — SIGNIFICANT CHANGE UP (ref 0.5–1.3)
EGFR: 75 ML/MIN/1.73M2 — SIGNIFICANT CHANGE UP
EOSINOPHIL # BLD AUTO: 0.16 K/UL — SIGNIFICANT CHANGE UP (ref 0–0.5)
EOSINOPHIL NFR BLD AUTO: 1.7 % — SIGNIFICANT CHANGE UP (ref 0–6)
GLUCOSE SERPL-MCNC: 80 MG/DL — SIGNIFICANT CHANGE UP (ref 70–99)
HCT VFR BLD CALC: 29.9 % — LOW (ref 34.5–45)
HGB BLD-MCNC: 9 G/DL — LOW (ref 11.5–15.5)
IMM GRANULOCYTES NFR BLD AUTO: 0.9 % — SIGNIFICANT CHANGE UP (ref 0–0.9)
LYMPHOCYTES # BLD AUTO: 3.4 K/UL — HIGH (ref 1–3.3)
LYMPHOCYTES # BLD AUTO: 35.2 % — SIGNIFICANT CHANGE UP (ref 13–44)
MAGNESIUM SERPL-MCNC: 1.9 MG/DL — SIGNIFICANT CHANGE UP (ref 1.6–2.6)
MCHC RBC-ENTMCNC: 26.6 PG — LOW (ref 27–34)
MCHC RBC-ENTMCNC: 30.1 GM/DL — LOW (ref 32–36)
MCV RBC AUTO: 88.5 FL — SIGNIFICANT CHANGE UP (ref 80–100)
MONOCYTES # BLD AUTO: 0.53 K/UL — SIGNIFICANT CHANGE UP (ref 0–0.9)
MONOCYTES NFR BLD AUTO: 5.5 % — SIGNIFICANT CHANGE UP (ref 2–14)
NEUTROPHILS # BLD AUTO: 5.44 K/UL — SIGNIFICANT CHANGE UP (ref 1.8–7.4)
NEUTROPHILS NFR BLD AUTO: 56.3 % — SIGNIFICANT CHANGE UP (ref 43–77)
NRBC # BLD: 0 /100 WBCS — SIGNIFICANT CHANGE UP (ref 0–0)
PHOSPHATE SERPL-MCNC: 4.3 MG/DL — SIGNIFICANT CHANGE UP (ref 2.5–4.5)
PLATELET # BLD AUTO: 160 K/UL — SIGNIFICANT CHANGE UP (ref 150–400)
POTASSIUM SERPL-MCNC: 4.3 MMOL/L — SIGNIFICANT CHANGE UP (ref 3.5–5.3)
POTASSIUM SERPL-SCNC: 4.3 MMOL/L — SIGNIFICANT CHANGE UP (ref 3.5–5.3)
PROT SERPL-MCNC: 5.8 G/DL — LOW (ref 6–8.3)
RBC # BLD: 3.38 M/UL — LOW (ref 3.8–5.2)
RBC # FLD: 19.9 % — HIGH (ref 10.3–14.5)
SODIUM SERPL-SCNC: 146 MMOL/L — HIGH (ref 135–145)
WBC # BLD: 9.66 K/UL — SIGNIFICANT CHANGE UP (ref 3.8–10.5)
WBC # FLD AUTO: 9.66 K/UL — SIGNIFICANT CHANGE UP (ref 3.8–10.5)

## 2023-06-11 PROCEDURE — 99232 SBSQ HOSP IP/OBS MODERATE 35: CPT

## 2023-06-11 RX ADMIN — Medication 975 MILLIGRAM(S): at 08:39

## 2023-06-11 RX ADMIN — SODIUM CHLORIDE 3 MILLILITER(S): 9 INJECTION INTRAMUSCULAR; INTRAVENOUS; SUBCUTANEOUS at 16:04

## 2023-06-11 RX ADMIN — SODIUM CHLORIDE 3 MILLILITER(S): 9 INJECTION INTRAMUSCULAR; INTRAVENOUS; SUBCUTANEOUS at 06:07

## 2023-06-11 RX ADMIN — HEPARIN SODIUM 7500 UNIT(S): 5000 INJECTION INTRAVENOUS; SUBCUTANEOUS at 13:58

## 2023-06-11 RX ADMIN — Medication 975 MILLIGRAM(S): at 03:54

## 2023-06-11 RX ADMIN — HEPARIN SODIUM 7500 UNIT(S): 5000 INJECTION INTRAVENOUS; SUBCUTANEOUS at 05:39

## 2023-06-11 RX ADMIN — Medication 200 MILLIGRAM(S): at 20:31

## 2023-06-11 RX ADMIN — Medication 1 TABLET(S): at 11:16

## 2023-06-11 RX ADMIN — Medication 200 MILLIGRAM(S): at 13:09

## 2023-06-11 RX ADMIN — HEPARIN SODIUM 7500 UNIT(S): 5000 INJECTION INTRAVENOUS; SUBCUTANEOUS at 20:29

## 2023-06-11 RX ADMIN — Medication 975 MILLIGRAM(S): at 09:40

## 2023-06-11 RX ADMIN — CEFTRIAXONE 100 MILLIGRAM(S): 500 INJECTION, POWDER, FOR SOLUTION INTRAMUSCULAR; INTRAVENOUS at 14:50

## 2023-06-11 RX ADMIN — IRON SUCROSE 110 MILLIGRAM(S): 20 INJECTION, SOLUTION INTRAVENOUS at 11:16

## 2023-06-11 RX ADMIN — Medication 975 MILLIGRAM(S): at 14:51

## 2023-06-11 RX ADMIN — Medication 975 MILLIGRAM(S): at 20:30

## 2023-06-11 RX ADMIN — Medication 200 MILLIGRAM(S): at 05:39

## 2023-06-11 NOTE — PROGRESS NOTE ADULT - ASSESSMENT
31 yo F s/p vaginal delivery 6/7 c/b chorioamnionitis with E coli bacteremia, post-partum hemorrhage, pulmonary edema.  Clinically continuing to improve, no further fever, still mild lower abd tenderness, leukocytosis has resolved.  E coli is susceptible to ceftriaxone, which she is tolerating.    Suggest:  - Continue to f/u blood culture from 6/8  - Continue ceftriaxone 2 g IV q24h.  Okay to place midline with plan for 14 d course (6/7-6/20)  - Weekly labs while on IV antibiotics - CBC with diff, CMP.  Fax to Dr. Capps at [phone]  - We will arrange for f/u with Dr Capps in ~2 weeks  Recommendations discussed with primary team - please recall if further ID input is desired, team 1.

## 2023-06-11 NOTE — PROGRESS NOTE ADULT - SUBJECTIVE AND OBJECTIVE BOX
INTERVAL HPI/OVERNIGHT EVENTS:  Afebrile, continues to feel     CONSTITUTIONAL:  No fever, chills, night sweats  EYES:  No photophobia or visual changes  CARDIOVASCULAR:  No chest pain  RESPIRATORY:  No cough, wheezing, or SOB   GASTROINTESTINAL:  No nausea, vomiting, diarrhea, constipation, or abdominal pain  GENITOURINARY:  No frequency, urgency, dysuria or hematuria  NEUROLOGIC:  No headache, lightheadedness      ANTIBIOTICS/RELEVANT:          Vital Signs Last 24 Hrs  T(C): 36.6 (11 Jun 2023 13:00), Max: 36.8 (10 Vincent 2023 18:00)  T(F): 97.9 (11 Jun 2023 13:00), Max: 98.3 (10 Vincent 2023 22:00)  HR: 80 (11 Jun 2023 13:00) (80 - 96)  BP: 138/87 (11 Jun 2023 13:00) (133/85 - 146/93)  BP(mean): 101 (11 Jun 2023 10:00) (101 - 101)  RR: 18 (11 Jun 2023 13:00) (16 - 18)  SpO2: 96% (11 Jun 2023 13:00) (95% - 97%)    Parameters below as of 11 Jun 2023 13:00  Patient On (Oxygen Delivery Method): room air        PHYSICAL EXAM:  Constitutional:  Alert  Eyes:  Sclerae anicteric, conjunctivae clear, PERRL  Ear/Nose/Throat:  No nasal exudate or sinus tenderness;  No buccal mucosal lesions, no pharyngeal erythema or exudate	  Neck:  Supple, no adenopathy  Respiratory:  Clear bilaterally  Cardiovascular:  RRR, S1S2, no murmur appreciated  Gastrointestinal:  Symmetric, normoactive BS, soft, NT, no masses, guarding or rebound.  No HSM  Extremities:  No edema      LABS:                        9.0    9.66  )-----------( 160      ( 11 Jun 2023 05:30 )             29.9         06-11    146<H>  |  115<H>  |  8   ----------------------------<  80  4.3   |  17<L>  |  1.02    Ca    8.7      11 Jun 2023 05:30  Phos  4.3     06-11  Mg     1.9     06-11    TPro  5.8<L>  /  Alb  2.9<L>  /  TBili  0.5  /  DBili  x   /  AST  28  /  ALT  12  /  AlkPhos  196<H>  06-11          MICROBIOLOGY:        RADIOLOGY & ADDITIONAL STUDIES: INTERVAL HPI/OVERNIGHT EVENTS:  Afebrile, continues to feel better, still some crampy lower abd pain    CONSTITUTIONAL:  No fever, chills, night sweats  EYES:  No photophobia or visual changes  CARDIOVASCULAR:  No chest pain  RESPIRATORY:  No cough, wheezing, or SOB   GASTROINTESTINAL:  No nausea, vomiting, diarrhea, constipation, or abdominal pain  GENITOURINARY:  No frequency, urgency, dysuria or hematuria  NEUROLOGIC:  No headache, lightheadedness      ANTIBIOTICS/RELEVANT:  Ceftriaxone 2 g IV q24h (6/8;  6/10-present)    Pip-tazo 6/8-6/10  Amp 6/7  Cefazolin 6/7  Ceftriaxone 6/8  Clinda 6/7-6/8  Gent 6/7-6/8      Vital Signs Last 24 Hrs  T(C): 36.6 (11 Jun 2023 13:00), Max: 36.8 (10 Vincent 2023 18:00)  T(F): 97.9 (11 Jun 2023 13:00), Max: 98.3 (10 Vincent 2023 22:00)  HR: 80 (11 Jun 2023 13:00) (80 - 96)  BP: 138/87 (11 Jun 2023 13:00) (133/85 - 146/93)  BP(mean): 101 (11 Jun 2023 10:00) (101 - 101)  RR: 18 (11 Jun 2023 13:00) (16 - 18)  SpO2: 96% (11 Jun 2023 13:00) (95% - 97%)    Parameters below as of 11 Jun 2023 13:00  Patient On (Oxygen Delivery Method): room air        PHYSICAL EXAM:  Constitutional:  Alert  Eyes:  Sclerae anicteric, conjunctivae clear, PERRL  Ear/Nose/Throat:  No nasal exudate or sinus tenderness;  No buccal mucosal lesions, no pharyngeal erythema or exudate	  Neck:  Supple, no adenopathy  Respiratory:  Clear bilaterally  Cardiovascular:  RRR, S1S2, no murmur appreciated  Gastrointestinal:  Symmetric, normoactive BS, soft, mild lower abd tenderness to palpation, no masses, guarding or rebound.  No HSM  Extremities:  No edema      LABS:                        9.0    9.66  )-----------( 160      ( 11 Jun 2023 05:30 )             29.9         06-11    146<H>  |  115<H>  |  8   ----------------------------<  80  4.3   |  17<L>  |  1.02    Ca    8.7      11 Jun 2023 05:30  Phos  4.3     06-11  Mg     1.9     06-11    TPro  5.8<L>  /  Alb  2.9<L>  /  TBili  0.5  /  DBili  x   /  AST  28  /  ALT  12  /  AlkPhos  196<H>  06-11    MICROBIOLOGY:    Blood cultures:  6/7 X 2 - E coli (1/4);  6/8 - NGTD    RADIOLOGY & ADDITIONAL STUDIES:

## 2023-06-11 NOTE — PROGRESS NOTE ADULT - ASSESSMENT
A/P 32y s/p , PPD#2, c/b Sepsis, PPH, PEC w SF, Pulm HTN, Diastolic Dysfunction, CARYN, stable, meeting postpartum milestones     #Sepsis  - BCx -  Ecoli bacteremia  - Continue ceftriaxone 2g q 24 hrs  - Appreciate ID recs    # PPH  - s/p 2u pRBCs  - s/p NIKKI, double pit, TXA x2, hemabate, cytotec 1000 x 2  - Hb stable    #PEC w SF  - s/p IV Mg x 12 hr  - Denying toxic symptoms  - Continue labetalol 300 TID    #Pulm HTN/Diastolic Dysfunction  - asymptomatic  - likely / PEC  - Cardiology following, will f/u need for lasix today    #CARYN  - Cr improving  - Voiding    #PP Care  - Pain: well controlled on tylenol/motrin  - GI: Tolerating regular diet  - : urinating without difficulty/pain  - DVT prophylaxis: ambulating frequently  - Dispo: PPD 2, unless otherwise specified

## 2023-06-11 NOTE — PROGRESS NOTE ADULT - SUBJECTIVE AND OBJECTIVE BOX
Patient evaluated at bedside this morning, resting comfortable in bed, no acute events overnight.  She reports pain is well controlled with tylenol and motrin.  She denies headache, vision change,s dizziness, chest pain, palpitations, shortness of breath, nausea, vomiting, fever, chills, RUQ/epigastric pain, heavy vaginal bleeding. She has been ambulating without assistance, voiding spontaneously.  Tolerating food well, without nausea/vomit.      Physical Exam:  T(C): 36.8 (06-11-23 @ 06:00), Max: 36.8 (06-10-23 @ 22:00)  HR: 88 (06-11-23 @ 06:00) (88 - 88)  BP: 142/89 (06-11-23 @ 06:00) (135/80 - 142/89)  RR: 16 (06-11-23 @ 06:00) (16 - 18)  SpO2: 95% (06-11-23 @ 06:00) (95% - 97%)    GA: NAD, A&O x 3  Pulm: no increased work of breathing  Abd: soft, nontender, nondistended, no rebound or guarding, uterus firm.  Extremities: no swelling or calf tenderness                          9.0    9.66  )-----------( 160      ( 11 Jun 2023 05:30 )             29.9     06-11    146<H>  |  115<H>  |  8   ----------------------------<  80  4.3   |  17<L>  |  1.02    Ca    8.7      11 Jun 2023 05:30  Phos  4.3     06-11  Mg     1.9     06-11    TPro  5.8<L>  /  Alb  2.9<L>  /  TBili  0.5  /  DBili  x   /  AST  28  /  ALT  12  /  AlkPhos  196<H>  06-11    acetaminophen     Tablet .. 975 milliGRAM(s) Oral <User Schedule>  benzocaine 20%/menthol 0.5% Spray 1 Spray(s) Topical every 6 hours PRN  cefTRIAXone   IVPB 2000 milliGRAM(s) IV Intermittent every 24 hours  dibucaine 1% Ointment 1 Application(s) Topical every 6 hours PRN  diphenhydrAMINE 25 milliGRAM(s) Oral every 6 hours PRN  diphtheria/tetanus/pertussis (acellular) Vaccine (Adacel) 0.5 milliLiter(s) IntraMuscular once  heparin   Injectable 7500 Unit(s) SubCutaneous every 8 hours  hydrocortisone 1% Cream 1 Application(s) Topical every 6 hours PRN  iron sucrose IVPB 200 milliGRAM(s) IV Intermittent every 24 hours  labetalol 200 milliGRAM(s) Oral every 8 hours  lanolin Ointment 1 Application(s) Topical every 6 hours PRN  magnesium hydroxide Suspension 30 milliLiter(s) Oral two times a day PRN  oxyCODONE    IR 5 milliGRAM(s) Oral once PRN  oxyCODONE    IR 5 milliGRAM(s) Oral every 3 hours PRN  polyethylene glycol 3350 17 Gram(s) Oral daily  pramoxine 1%/zinc 5% Cream 1 Application(s) Topical every 4 hours PRN  prenatal multivitamin 1 Tablet(s) Oral daily  senna 2 Tablet(s) Oral at bedtime  simethicone 80 milliGRAM(s) Chew every 4 hours PRN  sodium chloride 0.9% lock flush 3 milliLiter(s) IV Push every 8 hours  witch hazel Pads 1 Application(s) Topical every 4 hours PRN

## 2023-06-12 LAB
ALBUMIN SERPL ELPH-MCNC: 2.9 G/DL — LOW (ref 3.3–5)
ALP SERPL-CCNC: 171 U/L — HIGH (ref 40–120)
ALT FLD-CCNC: 12 U/L — SIGNIFICANT CHANGE UP (ref 10–45)
ANION GAP SERPL CALC-SCNC: 10 MMOL/L — SIGNIFICANT CHANGE UP (ref 5–17)
ANION GAP SERPL CALC-SCNC: 13 MMOL/L — SIGNIFICANT CHANGE UP (ref 5–17)
ANISOCYTOSIS BLD QL: SIGNIFICANT CHANGE UP
APTT BLD: 31.3 SEC — SIGNIFICANT CHANGE UP (ref 27.5–35.5)
AST SERPL-CCNC: 18 U/L — SIGNIFICANT CHANGE UP (ref 10–40)
BASOPHILS # BLD AUTO: 0 K/UL — SIGNIFICANT CHANGE UP (ref 0–0.2)
BASOPHILS NFR BLD AUTO: 0 % — SIGNIFICANT CHANGE UP (ref 0–2)
BILIRUB SERPL-MCNC: 0.4 MG/DL — SIGNIFICANT CHANGE UP (ref 0.2–1.2)
BLD GP AB SCN SERPL QL: NEGATIVE — SIGNIFICANT CHANGE UP
BUN SERPL-MCNC: 6 MG/DL — LOW (ref 7–23)
BUN SERPL-MCNC: 6 MG/DL — LOW (ref 7–23)
BURR CELLS BLD QL SMEAR: PRESENT — SIGNIFICANT CHANGE UP
CALCIUM SERPL-MCNC: 8.3 MG/DL — LOW (ref 8.4–10.5)
CALCIUM SERPL-MCNC: 8.7 MG/DL — SIGNIFICANT CHANGE UP (ref 8.4–10.5)
CHLORIDE SERPL-SCNC: 114 MMOL/L — HIGH (ref 96–108)
CHLORIDE SERPL-SCNC: 116 MMOL/L — HIGH (ref 96–108)
CO2 SERPL-SCNC: 17 MMOL/L — LOW (ref 22–31)
CO2 SERPL-SCNC: 19 MMOL/L — LOW (ref 22–31)
CREAT SERPL-MCNC: 0.94 MG/DL — SIGNIFICANT CHANGE UP (ref 0.5–1.3)
CREAT SERPL-MCNC: 0.99 MG/DL — SIGNIFICANT CHANGE UP (ref 0.5–1.3)
CULTURE RESULTS: SIGNIFICANT CHANGE UP
DACRYOCYTES BLD QL SMEAR: SLIGHT — SIGNIFICANT CHANGE UP
EGFR: 78 ML/MIN/1.73M2 — SIGNIFICANT CHANGE UP
EGFR: 83 ML/MIN/1.73M2 — SIGNIFICANT CHANGE UP
EOSINOPHIL # BLD AUTO: 0.13 K/UL — SIGNIFICANT CHANGE UP (ref 0–0.5)
EOSINOPHIL NFR BLD AUTO: 1.7 % — SIGNIFICANT CHANGE UP (ref 0–6)
FIBRINOGEN PPP-MCNC: 418 MG/DL — SIGNIFICANT CHANGE UP (ref 200–445)
GIANT PLATELETS BLD QL SMEAR: PRESENT — SIGNIFICANT CHANGE UP
GLUCOSE SERPL-MCNC: 80 MG/DL — SIGNIFICANT CHANGE UP (ref 70–99)
GLUCOSE SERPL-MCNC: 91 MG/DL — SIGNIFICANT CHANGE UP (ref 70–99)
HCT VFR BLD CALC: 28.9 % — LOW (ref 34.5–45)
HGB BLD-MCNC: 8.9 G/DL — LOW (ref 11.5–15.5)
HYPOCHROMIA BLD QL: SLIGHT — SIGNIFICANT CHANGE UP
INR BLD: 0.94 — SIGNIFICANT CHANGE UP (ref 0.88–1.16)
LYMPHOCYTES # BLD AUTO: 2.35 K/UL — SIGNIFICANT CHANGE UP (ref 1–3.3)
LYMPHOCYTES # BLD AUTO: 29.8 % — SIGNIFICANT CHANGE UP (ref 13–44)
MACROCYTES BLD QL: SLIGHT — SIGNIFICANT CHANGE UP
MAGNESIUM SERPL-MCNC: 1.9 MG/DL — SIGNIFICANT CHANGE UP (ref 1.6–2.6)
MAGNESIUM SERPL-MCNC: 2.1 MG/DL — SIGNIFICANT CHANGE UP (ref 1.6–2.6)
MANUAL SMEAR VERIFICATION: SIGNIFICANT CHANGE UP
MCHC RBC-ENTMCNC: 27.2 PG — SIGNIFICANT CHANGE UP (ref 27–34)
MCHC RBC-ENTMCNC: 30.8 GM/DL — LOW (ref 32–36)
MCV RBC AUTO: 88.4 FL — SIGNIFICANT CHANGE UP (ref 80–100)
METAMYELOCYTES # FLD: 0.9 % — HIGH (ref 0–0)
MICROCYTES BLD QL: SIGNIFICANT CHANGE UP
MONOCYTES # BLD AUTO: 0.35 K/UL — SIGNIFICANT CHANGE UP (ref 0–0.9)
MONOCYTES NFR BLD AUTO: 4.4 % — SIGNIFICANT CHANGE UP (ref 2–14)
NEUTROPHILS # BLD AUTO: 4.99 K/UL — SIGNIFICANT CHANGE UP (ref 1.8–7.4)
NEUTROPHILS NFR BLD AUTO: 63.2 % — SIGNIFICANT CHANGE UP (ref 43–77)
NRBC # BLD: 3 /100 — HIGH (ref 0–0)
NRBC # BLD: SIGNIFICANT CHANGE UP /100 WBCS (ref 0–0)
OVALOCYTES BLD QL SMEAR: SLIGHT — SIGNIFICANT CHANGE UP
PHOSPHATE SERPL-MCNC: 3.8 MG/DL — SIGNIFICANT CHANGE UP (ref 2.5–4.5)
PHOSPHATE SERPL-MCNC: 4 MG/DL — SIGNIFICANT CHANGE UP (ref 2.5–4.5)
PLAT MORPH BLD: ABNORMAL
PLATELET # BLD AUTO: 165 K/UL — SIGNIFICANT CHANGE UP (ref 150–400)
POIKILOCYTOSIS BLD QL AUTO: SLIGHT — SIGNIFICANT CHANGE UP
POLYCHROMASIA BLD QL SMEAR: SLIGHT — SIGNIFICANT CHANGE UP
POTASSIUM SERPL-MCNC: 3.7 MMOL/L — SIGNIFICANT CHANGE UP (ref 3.5–5.3)
POTASSIUM SERPL-MCNC: 4.1 MMOL/L — SIGNIFICANT CHANGE UP (ref 3.5–5.3)
POTASSIUM SERPL-SCNC: 3.7 MMOL/L — SIGNIFICANT CHANGE UP (ref 3.5–5.3)
POTASSIUM SERPL-SCNC: 4.1 MMOL/L — SIGNIFICANT CHANGE UP (ref 3.5–5.3)
PROT SERPL-MCNC: 5.5 G/DL — LOW (ref 6–8.3)
PROTHROM AB SERPL-ACNC: 11.2 SEC — SIGNIFICANT CHANGE UP (ref 10.5–13.4)
RBC # BLD: 3.27 M/UL — LOW (ref 3.8–5.2)
RBC # FLD: 20.1 % — HIGH (ref 10.3–14.5)
RBC BLD AUTO: ABNORMAL
RH IG SCN BLD-IMP: POSITIVE — SIGNIFICANT CHANGE UP
SCHISTOCYTES BLD QL AUTO: SLIGHT — SIGNIFICANT CHANGE UP
SMUDGE CELLS # BLD: PRESENT — SIGNIFICANT CHANGE UP
SODIUM SERPL-SCNC: 144 MMOL/L — SIGNIFICANT CHANGE UP (ref 135–145)
SODIUM SERPL-SCNC: 145 MMOL/L — SIGNIFICANT CHANGE UP (ref 135–145)
SPECIMEN SOURCE: SIGNIFICANT CHANGE UP
WBC # BLD: 7.9 K/UL — SIGNIFICANT CHANGE UP (ref 3.8–10.5)
WBC # FLD AUTO: 7.9 K/UL — SIGNIFICANT CHANGE UP (ref 3.8–10.5)

## 2023-06-12 PROCEDURE — 99232 SBSQ HOSP IP/OBS MODERATE 35: CPT

## 2023-06-12 PROCEDURE — 71045 X-RAY EXAM CHEST 1 VIEW: CPT | Mod: 26

## 2023-06-12 RX ORDER — FUROSEMIDE 40 MG
20 TABLET ORAL ONCE
Refills: 0 | Status: COMPLETED | OUTPATIENT
Start: 2023-06-12 | End: 2023-06-12

## 2023-06-12 RX ORDER — HEPARIN SODIUM 5000 [USP'U]/ML
7500 INJECTION INTRAVENOUS; SUBCUTANEOUS EVERY 8 HOURS
Refills: 0 | Status: DISCONTINUED | OUTPATIENT
Start: 2023-06-12 | End: 2023-06-18

## 2023-06-12 RX ORDER — LABETALOL HCL 100 MG
300 TABLET ORAL EVERY 8 HOURS
Refills: 0 | Status: DISCONTINUED | OUTPATIENT
Start: 2023-06-13 | End: 2023-06-18

## 2023-06-12 RX ADMIN — Medication 200 MILLIGRAM(S): at 12:34

## 2023-06-12 RX ADMIN — Medication 975 MILLIGRAM(S): at 05:42

## 2023-06-12 RX ADMIN — Medication 200 MILLIGRAM(S): at 05:43

## 2023-06-12 RX ADMIN — CEFTRIAXONE 100 MILLIGRAM(S): 500 INJECTION, POWDER, FOR SOLUTION INTRAMUSCULAR; INTRAVENOUS at 15:07

## 2023-06-12 RX ADMIN — Medication 975 MILLIGRAM(S): at 23:06

## 2023-06-12 RX ADMIN — Medication 200 MILLIGRAM(S): at 22:21

## 2023-06-12 RX ADMIN — SODIUM CHLORIDE 3 MILLILITER(S): 9 INJECTION INTRAMUSCULAR; INTRAVENOUS; SUBCUTANEOUS at 23:06

## 2023-06-12 RX ADMIN — Medication 975 MILLIGRAM(S): at 15:07

## 2023-06-12 RX ADMIN — Medication 1 TABLET(S): at 12:24

## 2023-06-12 RX ADMIN — Medication 975 MILLIGRAM(S): at 16:13

## 2023-06-12 RX ADMIN — SODIUM CHLORIDE 3 MILLILITER(S): 9 INJECTION INTRAMUSCULAR; INTRAVENOUS; SUBCUTANEOUS at 16:53

## 2023-06-12 RX ADMIN — Medication 20 MILLIGRAM(S): at 17:13

## 2023-06-12 RX ADMIN — POLYETHYLENE GLYCOL 3350 17 GRAM(S): 17 POWDER, FOR SOLUTION ORAL at 12:24

## 2023-06-12 RX ADMIN — Medication 975 MILLIGRAM(S): at 22:21

## 2023-06-12 RX ADMIN — IRON SUCROSE 110 MILLIGRAM(S): 20 INJECTION, SOLUTION INTRAVENOUS at 13:08

## 2023-06-12 NOTE — PROGRESS NOTE ADULT - SUBJECTIVE AND OBJECTIVE BOX
Feeling better. Mild SOB with exertion. Leg edema improved.   Denies CP, palpitations or dizziness.    Breast feeding

## 2023-06-12 NOTE — PROGRESS NOTE ADULT - SUBJECTIVE AND OBJECTIVE BOX
Patient evaluated at bedside this morning, resting comfortable in bed, no acute events overnight.  She reports pain is well controlled with tylenol and motrin.  She denies headache, scotoma, RUQ/epigastric pain dizziness, chest pain, palpitations, shortness of breath, nausea, vomiting, heavy vaginal bleeding or perineal discomfort. Reports decrease in amount of vaginal bleeding and denies clots.  She has been ambulating without assistance, voiding spontaneously.  Tolerating food well, without nausea/vomit.      Physical Exam:  T(C): 36.6 (06-12-23 @ 05:43), Max: 37.1 (06-11-23 @ 20:31)  HR: 89 (06-12-23 @ 05:43) (84 - 93)  BP: 137/84 (06-12-23 @ 05:43) (136/84 - 153/97)  RR: 18 (06-12-23 @ 05:43) (18 - 19)  SpO2: 97% (06-12-23 @ 05:43) (95% - 98%)    GA: NAD, comfortable, conversational  Abd: soft, nontender, nondistended, no rebound or guarding, uterus firm.  Extremities: no swelling or calf tenderness  Perineum: lochia wnl                          8.9    7.90  )-----------( 165      ( 12 Jun 2023 05:30 )             28.9     06-12    145  |  116<H>  |  6<L>  ----------------------------<  80  3.7   |  19<L>  |  0.99    Ca    8.7      12 Jun 2023 05:30  Phos  4.0     06-12  Mg     1.9     06-12    TPro  5.5<L>  /  Alb  2.9<L>  /  TBili  0.4  /  DBili  x   /  AST  18  /  ALT  12  /  AlkPhos  171<H>  06-12    acetaminophen     Tablet .. 975 milliGRAM(s) Oral <User Schedule>  benzocaine 20%/menthol 0.5% Spray 1 Spray(s) Topical every 6 hours PRN  cefTRIAXone   IVPB 2000 milliGRAM(s) IV Intermittent every 24 hours  dibucaine 1% Ointment 1 Application(s) Topical every 6 hours PRN  diphenhydrAMINE 25 milliGRAM(s) Oral every 6 hours PRN  diphtheria/tetanus/pertussis (acellular) Vaccine (Adacel) 0.5 milliLiter(s) IntraMuscular once  hydrocortisone 1% Cream 1 Application(s) Topical every 6 hours PRN  iron sucrose IVPB 200 milliGRAM(s) IV Intermittent every 24 hours  labetalol 200 milliGRAM(s) Oral every 8 hours  lanolin Ointment 1 Application(s) Topical every 6 hours PRN  magnesium hydroxide Suspension 30 milliLiter(s) Oral two times a day PRN  oxyCODONE    IR 5 milliGRAM(s) Oral once PRN  oxyCODONE    IR 5 milliGRAM(s) Oral every 3 hours PRN  polyethylene glycol 3350 17 Gram(s) Oral daily  pramoxine 1%/zinc 5% Cream 1 Application(s) Topical every 4 hours PRN  prenatal multivitamin 1 Tablet(s) Oral daily  senna 2 Tablet(s) Oral at bedtime  simethicone 80 milliGRAM(s) Chew every 4 hours PRN  sodium chloride 0.9% lock flush 3 milliLiter(s) IV Push every 8 hours  witch hazel Pads 1 Application(s) Topical every 4 hours PRN

## 2023-06-12 NOTE — PROGRESS NOTE ADULT - ASSESSMENT
A/P 32y s/p , PPD#4, c/b Sepsis, PPH, PEC w SF, Pulm HTN, Diastolic Dysfunction, CARYN, stable, meeting postpartum milestones     #Sepsis  - BCx - 6/7 Ecoli bacteremia  - Continue ceftriaxone 2g q 24 hrs  - Appreciate ID recs  - Consider PIC placement today and setup with [facility] for outpt setting    # PPH  - s/p 2u pRBCs  - s/p NIKKI, double pit, TXA x2, hemabate, cytotec 1000 x 2  - Hb stable and pt hemodynamically stable and asymtpomatic    #PEC w SF  - s/p IV Mg x 12 hr  - Denying toxic symptoms  - Continue labetalol 300 TID    #Pulm HTN/Diastolic Dysfunction  - asymptomatic  - likely 2/2 PEC  - Cardiology following, will f/u need for lasix today    #CARYN  - Cr improving  - Voiding    #PP Care  - Pain: well controlled on tylenol/motrin  - GI: Tolerating regular diet  - : urinating without difficulty/pain  - DVT prophylaxis: ambulating frequently  - Dispo: PPD 5/6, unless otherwise specified

## 2023-06-12 NOTE — PROGRESS NOTE ADULT - ASSESSMENT
32F w/  s/p vaginal delivery on  now w/ e/o pre-eclampsia w/ severe features: CARYN and pulmonary edema, now improving  -HFpEF - Lasix 20 PO today  - BMP to monitor electrolytes, replete as needed  -HTN - currently on Labetalol 200 PO TID w/ goal BP <140/90

## 2023-06-13 LAB
ALBUMIN SERPL ELPH-MCNC: 3.5 G/DL — SIGNIFICANT CHANGE UP (ref 3.3–5)
ALP SERPL-CCNC: 184 U/L — HIGH (ref 40–120)
ALT FLD-CCNC: 15 U/L — SIGNIFICANT CHANGE UP (ref 10–45)
ANION GAP SERPL CALC-SCNC: 10 MMOL/L — SIGNIFICANT CHANGE UP (ref 5–17)
AST SERPL-CCNC: 21 U/L — SIGNIFICANT CHANGE UP (ref 10–40)
BASOPHILS # BLD AUTO: 0.02 K/UL — SIGNIFICANT CHANGE UP (ref 0–0.2)
BASOPHILS NFR BLD AUTO: 0.3 % — SIGNIFICANT CHANGE UP (ref 0–2)
BILIRUB SERPL-MCNC: 0.5 MG/DL — SIGNIFICANT CHANGE UP (ref 0.2–1.2)
BUN SERPL-MCNC: 6 MG/DL — LOW (ref 7–23)
CALCIUM SERPL-MCNC: 9 MG/DL — SIGNIFICANT CHANGE UP (ref 8.4–10.5)
CHLORIDE SERPL-SCNC: 111 MMOL/L — HIGH (ref 96–108)
CO2 SERPL-SCNC: 21 MMOL/L — LOW (ref 22–31)
CREAT SERPL-MCNC: 1.08 MG/DL — SIGNIFICANT CHANGE UP (ref 0.5–1.3)
CULTURE RESULTS: SIGNIFICANT CHANGE UP
EGFR: 70 ML/MIN/1.73M2 — SIGNIFICANT CHANGE UP
EOSINOPHIL # BLD AUTO: 0.09 K/UL — SIGNIFICANT CHANGE UP (ref 0–0.5)
EOSINOPHIL NFR BLD AUTO: 1.2 % — SIGNIFICANT CHANGE UP (ref 0–6)
GLUCOSE SERPL-MCNC: 88 MG/DL — SIGNIFICANT CHANGE UP (ref 70–99)
HCT VFR BLD CALC: 33.3 % — LOW (ref 34.5–45)
HGB BLD-MCNC: 10.1 G/DL — LOW (ref 11.5–15.5)
IMM GRANULOCYTES NFR BLD AUTO: 0.8 % — SIGNIFICANT CHANGE UP (ref 0–0.9)
LYMPHOCYTES # BLD AUTO: 1.85 K/UL — SIGNIFICANT CHANGE UP (ref 1–3.3)
LYMPHOCYTES # BLD AUTO: 24.5 % — SIGNIFICANT CHANGE UP (ref 13–44)
MAGNESIUM SERPL-MCNC: 1.8 MG/DL — SIGNIFICANT CHANGE UP (ref 1.6–2.6)
MCHC RBC-ENTMCNC: 26.6 PG — LOW (ref 27–34)
MCHC RBC-ENTMCNC: 30.3 GM/DL — LOW (ref 32–36)
MCV RBC AUTO: 87.9 FL — SIGNIFICANT CHANGE UP (ref 80–100)
MONOCYTES # BLD AUTO: 0.59 K/UL — SIGNIFICANT CHANGE UP (ref 0–0.9)
MONOCYTES NFR BLD AUTO: 7.8 % — SIGNIFICANT CHANGE UP (ref 2–14)
NEUTROPHILS # BLD AUTO: 4.94 K/UL — SIGNIFICANT CHANGE UP (ref 1.8–7.4)
NEUTROPHILS NFR BLD AUTO: 65.4 % — SIGNIFICANT CHANGE UP (ref 43–77)
NRBC # BLD: 0 /100 WBCS — SIGNIFICANT CHANGE UP (ref 0–0)
NT-PROBNP SERPL-SCNC: 2366 PG/ML — HIGH (ref 0–300)
PHOSPHATE SERPL-MCNC: 4.5 MG/DL — SIGNIFICANT CHANGE UP (ref 2.5–4.5)
PLATELET # BLD AUTO: 212 K/UL — SIGNIFICANT CHANGE UP (ref 150–400)
POTASSIUM SERPL-MCNC: 4.1 MMOL/L — SIGNIFICANT CHANGE UP (ref 3.5–5.3)
POTASSIUM SERPL-SCNC: 4.1 MMOL/L — SIGNIFICANT CHANGE UP (ref 3.5–5.3)
PROT SERPL-MCNC: 6.6 G/DL — SIGNIFICANT CHANGE UP (ref 6–8.3)
RBC # BLD: 3.79 M/UL — LOW (ref 3.8–5.2)
RBC # FLD: 21.2 % — HIGH (ref 10.3–14.5)
SODIUM SERPL-SCNC: 142 MMOL/L — SIGNIFICANT CHANGE UP (ref 135–145)
SPECIMEN SOURCE: SIGNIFICANT CHANGE UP
TROPONIN T, HIGH SENSITIVITY RESULT: 23 NG/L — SIGNIFICANT CHANGE UP (ref 0–51)
WBC # BLD: 7.55 K/UL — SIGNIFICANT CHANGE UP (ref 3.8–10.5)
WBC # FLD AUTO: 7.55 K/UL — SIGNIFICANT CHANGE UP (ref 3.8–10.5)

## 2023-06-13 PROCEDURE — 99232 SBSQ HOSP IP/OBS MODERATE 35: CPT

## 2023-06-13 PROCEDURE — 93970 EXTREMITY STUDY: CPT | Mod: 26

## 2023-06-13 PROCEDURE — 93306 TTE W/DOPPLER COMPLETE: CPT | Mod: 26

## 2023-06-13 PROCEDURE — 71275 CT ANGIOGRAPHY CHEST: CPT | Mod: 26

## 2023-06-13 PROCEDURE — 36569 INSJ PICC 5 YR+ W/O IMAGING: CPT

## 2023-06-13 PROCEDURE — 76937 US GUIDE VASCULAR ACCESS: CPT | Mod: 26,59

## 2023-06-13 RX ORDER — SODIUM CHLORIDE 9 MG/ML
10 INJECTION INTRAMUSCULAR; INTRAVENOUS; SUBCUTANEOUS
Refills: 0 | Status: DISCONTINUED | OUTPATIENT
Start: 2023-06-13 | End: 2023-06-18

## 2023-06-13 RX ORDER — CHLORHEXIDINE GLUCONATE 213 G/1000ML
1 SOLUTION TOPICAL
Refills: 0 | Status: DISCONTINUED | OUTPATIENT
Start: 2023-06-13 | End: 2023-06-13

## 2023-06-13 RX ORDER — FUROSEMIDE 40 MG
20 TABLET ORAL ONCE
Refills: 0 | Status: COMPLETED | OUTPATIENT
Start: 2023-06-13 | End: 2023-06-13

## 2023-06-13 RX ADMIN — HEPARIN SODIUM 7500 UNIT(S): 5000 INJECTION INTRAVENOUS; SUBCUTANEOUS at 14:28

## 2023-06-13 RX ADMIN — Medication 20 MILLIGRAM(S): at 00:14

## 2023-06-13 RX ADMIN — Medication 975 MILLIGRAM(S): at 02:14

## 2023-06-13 RX ADMIN — IRON SUCROSE 110 MILLIGRAM(S): 20 INJECTION, SOLUTION INTRAVENOUS at 13:14

## 2023-06-13 RX ADMIN — Medication 300 MILLIGRAM(S): at 06:32

## 2023-06-13 RX ADMIN — Medication 300 MILLIGRAM(S): at 22:05

## 2023-06-13 RX ADMIN — Medication 5 MILLIGRAM(S): at 18:15

## 2023-06-13 RX ADMIN — HEPARIN SODIUM 7500 UNIT(S): 5000 INJECTION INTRAVENOUS; SUBCUTANEOUS at 22:05

## 2023-06-13 RX ADMIN — SODIUM CHLORIDE 3 MILLILITER(S): 9 INJECTION INTRAMUSCULAR; INTRAVENOUS; SUBCUTANEOUS at 06:25

## 2023-06-13 RX ADMIN — Medication 975 MILLIGRAM(S): at 22:06

## 2023-06-13 RX ADMIN — CEFTRIAXONE 100 MILLIGRAM(S): 500 INJECTION, POWDER, FOR SOLUTION INTRAMUSCULAR; INTRAVENOUS at 15:29

## 2023-06-13 RX ADMIN — Medication 20 MILLIGRAM(S): at 10:07

## 2023-06-13 RX ADMIN — Medication 975 MILLIGRAM(S): at 03:03

## 2023-06-13 RX ADMIN — Medication 1 TABLET(S): at 13:14

## 2023-06-13 RX ADMIN — Medication 975 MILLIGRAM(S): at 15:29

## 2023-06-13 RX ADMIN — Medication 300 MILLIGRAM(S): at 14:28

## 2023-06-13 RX ADMIN — Medication 975 MILLIGRAM(S): at 10:07

## 2023-06-13 NOTE — PROGRESS NOTE ADULT - SUBJECTIVE AND OBJECTIVE BOX
Patient evaluated at bedside this morning, resting comfortably in bed, no acute events overnight.  She reports pain is well controlled with tylenol and motrin.  Reports decrease in amount of vaginal bleeding.  She has been ambulating without assistance, voiding spontaneously.  Tolerating food well, without nausea/vomit.    Reports her symptoms of shortness of breath overnight have improved, on 6L nasal cannula.     Physical Exam:  T(C): 37.1 (06-13-23 @ 06:00), Max: 37.1 (06-13-23 @ 06:00)  HR: 84 (06-13-23 @ 06:00) (84 - 95)  BP: 139/87 (06-13-23 @ 06:00) (139/87 - 148/96)  RR: 17 (06-13-23 @ 06:00) (17 - 18)  SpO2: 97% (06-13-23 @ 06:00) (95% - 100%)    GA: NAD, patient is alert and oriented  Resp: No increased work of breathing, breathing comfortably on RA  Abd: Soft, nontender, nondistended, no rebound or guarding, uterus firm.  Extremities: no swelling or calf tenderness                          8.9    7.90  )-----------( 165      ( 12 Jun 2023 05:30 )             28.9     06-12    144  |  114<H>  |  6<L>  ----------------------------<  91  4.1   |  17<L>  |  0.94    Ca    8.3<L>      12 Jun 2023 16:39  Phos  3.8     06-12  Mg     2.1     06-12    TPro  5.5<L>  /  Alb  2.9<L>  /  TBili  0.4  /  DBili  x   /  AST  18  /  ALT  12  /  AlkPhos  171<H>  06-12    acetaminophen     Tablet .. 975 milliGRAM(s) Oral <User Schedule>  benzocaine 20%/menthol 0.5% Spray 1 Spray(s) Topical every 6 hours PRN  cefTRIAXone   IVPB 2000 milliGRAM(s) IV Intermittent every 24 hours  dibucaine 1% Ointment 1 Application(s) Topical every 6 hours PRN  diphenhydrAMINE 25 milliGRAM(s) Oral every 6 hours PRN  diphtheria/tetanus/pertussis (acellular) Vaccine (Adacel) 0.5 milliLiter(s) IntraMuscular once  heparin   Injectable 7500 Unit(s) SubCutaneous every 8 hours  hydrocortisone 1% Cream 1 Application(s) Topical every 6 hours PRN  iron sucrose IVPB 200 milliGRAM(s) IV Intermittent every 24 hours  labetalol 300 milliGRAM(s) Oral every 8 hours  lanolin Ointment 1 Application(s) Topical every 6 hours PRN  magnesium hydroxide Suspension 30 milliLiter(s) Oral two times a day PRN  oxyCODONE    IR 5 milliGRAM(s) Oral once PRN  oxyCODONE    IR 5 milliGRAM(s) Oral every 3 hours PRN  polyethylene glycol 3350 17 Gram(s) Oral daily  pramoxine 1%/zinc 5% Cream 1 Application(s) Topical every 4 hours PRN  prenatal multivitamin 1 Tablet(s) Oral daily  senna 2 Tablet(s) Oral at bedtime  simethicone 80 milliGRAM(s) Chew every 4 hours PRN  sodium chloride 0.9% lock flush 3 milliLiter(s) IV Push every 8 hours  witch hazel Pads 1 Application(s) Topical every 4 hours PRN

## 2023-06-13 NOTE — CONSULT NOTE ADULT - SUBJECTIVE AND OBJECTIVE BOX
Vascular Access Service Consult Note    32yFemaleHEALTH ISSUES - PROBLEM Dx:             Diagnosis:    Indications for Vascular Access (Check all that apply)  [ x ]  Antibiotic Therapy     Antibiotic Prescribed:    ceftriaxone      Expected Duration of Therapy:             6/20/23  [  ]  IV Hydration  [  ]  Total Parenteral Nutrition  [  ]  Chemotherapy  [  ]  Difficult Venous Access  [  ]  CVP monitoring  [  ]  Medications with high potential for tissue necrosis on extravasation  [  ]  Other    Screening (Check all that apply)  Previous Radiation to chest  [  ] Yes      [ x ]  No  Breast Cancer                          [  ] Left     [  ]  Right    [ x ]  No  Lymph Node Dissection         [  ] Left     [  ]  Right    [x  ]  No  Pacemaker or ICD                   [  ] Left     [  ]  Right    [ x ]  No  Upper Extremity DVT             [  ] Left     [  ]  Right    [ x ]  No  Chronic Kidney Disease         [  ]  Yes     [ x]  No  Hemodialysis                           [  ]  Yes     [ x]  No  AV Fistula/ Graft                     [  ]  Left    [  ]  Right    [x  ]  No  Temp>100.3 F in past 24 H       [  ]  Yes     [x ]  No  H/O PICC/Midline                   [  ]  Yes     [x  ]  No    Lab data:                        8.9    7.90  )-----------( 165      ( 12 Jun 2023 05:30 )             28.9     06-12    144  |  114<H>  |  6<L>  ----------------------------<  91  4.1   |  17<L>  |  0.94    Ca    8.3<L>      12 Jun 2023 16:39  Phos  3.8     06-12  Mg     2.1     06-12    TPro  5.5<L>  /  Alb  2.9<L>  /  TBili  0.4  /  DBili  x   /  AST  18  /  ALT  12  /  AlkPhos  171<H>  06-12    PT/INR - ( 12 Jun 2023 05:30 )   PT: 11.2 sec;   INR: 0.94          PTT - ( 12 Jun 2023 05:30 )  PTT:31.3 sec      I have reviewed the chart, interviewed and examined the patient and determined that this patient:  [ x ] Is a candidate for a PICC line  [  ] Is a candidate for a Midline  [  ] Is not a candidate for vascular access device (reason)    Lumens:    [ x ] Single  [  ] Double

## 2023-06-13 NOTE — PROGRESS NOTE ADULT - SUBJECTIVE AND OBJECTIVE BOX
Overnight episodes of sudden episode of SOB while lying flat.    Given lasix with some improvement  Denies CP, palpitations, dizziness.  Edema improved.

## 2023-06-13 NOTE — PROGRESS NOTE ADULT - ASSESSMENT
A/P 32y s/p , PPD#5, c/b Sepsis, PPH, PEC w SF, Pulm HTN, Diastolic Dysfunction, CARYN, stable, meeting postpartum milestones     #Sepsis  - Afebrile  - BCx - 6/7 Ecoli bacteremia  - Continue ceftriaxone 2g q 24 hrs  - Appreciate ID recs  - Consider PICC placement today and setup with VNS for outpt setting    # PPH  - s/p 2u pRBCs  - s/p NIKKI, double pit, TXA x2, hemabate, cytotec 1000 x 2  - Hb stable and pt hemodynamically stable and asymtpomatic    #PEC w SF  - s/p IV Mg x 12 hr  - Denying toxic symptoms  - Continue labetalol 300 TID    #Pulm HTN/Diastolic Dysfunction  - Acutely worsened SOB and evidence of pulmonary edema / effusions overnight, s/p 20mg IV lasix overnight  - F/u cardiology recommendations  - Labetalol increased from 200 TID to 300 TID given cardiology recommendations for tighter BP control  - Consider repeat imaging    #CARYN  - Cr improving  - Voiding    #PP Care  - Pain: well controlled on tylenol/motrin  - GI: Tolerating regular diet  - : urinating without difficulty/pain  - DVT prophylaxis: ambulating frequently, SQH

## 2023-06-13 NOTE — PROGRESS NOTE ADULT - ASSESSMENT
#  6/ c/b PEC with severe features improving with lasix with acute episode of pulm edema yesterday.     - lasix IV 20 mg now  - echo   - BNP, trop  - consider CTA to r/o PE  - replete micheal     #  6/ c/b PEC with severe features improving with lasix with acute episode of pulm edema yesterday.     - lasix IV 20 mg now  - echo   - BNP, trop  - consider CTA to r/o PE  - replete lytes      Addendum: BNP elevated, trop indeterminate  Echo LVEF mildly reduced  - c/w lasix 20 mg IV   - start enalapril 5mg daily for reduced LVEF

## 2023-06-14 LAB
ALBUMIN SERPL ELPH-MCNC: 3.1 G/DL — LOW (ref 3.3–5)
ALP SERPL-CCNC: 161 U/L — HIGH (ref 40–120)
ALT FLD-CCNC: 13 U/L — SIGNIFICANT CHANGE UP (ref 10–45)
ANION GAP SERPL CALC-SCNC: 13 MMOL/L — SIGNIFICANT CHANGE UP (ref 5–17)
AST SERPL-CCNC: 19 U/L — SIGNIFICANT CHANGE UP (ref 10–40)
BASOPHILS # BLD AUTO: 0.03 K/UL — SIGNIFICANT CHANGE UP (ref 0–0.2)
BASOPHILS NFR BLD AUTO: 0.5 % — SIGNIFICANT CHANGE UP (ref 0–2)
BILIRUB SERPL-MCNC: 0.4 MG/DL — SIGNIFICANT CHANGE UP (ref 0.2–1.2)
BUN SERPL-MCNC: 8 MG/DL — SIGNIFICANT CHANGE UP (ref 7–23)
CALCIUM SERPL-MCNC: 8.6 MG/DL — SIGNIFICANT CHANGE UP (ref 8.4–10.5)
CHLORIDE SERPL-SCNC: 112 MMOL/L — HIGH (ref 96–108)
CO2 SERPL-SCNC: 18 MMOL/L — LOW (ref 22–31)
CREAT SERPL-MCNC: 0.95 MG/DL — SIGNIFICANT CHANGE UP (ref 0.5–1.3)
EGFR: 82 ML/MIN/1.73M2 — SIGNIFICANT CHANGE UP
EOSINOPHIL # BLD AUTO: 0.09 K/UL — SIGNIFICANT CHANGE UP (ref 0–0.5)
EOSINOPHIL NFR BLD AUTO: 1.4 % — SIGNIFICANT CHANGE UP (ref 0–6)
GLUCOSE SERPL-MCNC: 83 MG/DL — SIGNIFICANT CHANGE UP (ref 70–99)
HCT VFR BLD CALC: 32.4 % — LOW (ref 34.5–45)
HGB BLD-MCNC: 10.1 G/DL — LOW (ref 11.5–15.5)
IMM GRANULOCYTES NFR BLD AUTO: 1.1 % — HIGH (ref 0–0.9)
LYMPHOCYTES # BLD AUTO: 2.38 K/UL — SIGNIFICANT CHANGE UP (ref 1–3.3)
LYMPHOCYTES # BLD AUTO: 36.1 % — SIGNIFICANT CHANGE UP (ref 13–44)
MAGNESIUM SERPL-MCNC: 1.9 MG/DL — SIGNIFICANT CHANGE UP (ref 1.6–2.6)
MCHC RBC-ENTMCNC: 27.6 PG — SIGNIFICANT CHANGE UP (ref 27–34)
MCHC RBC-ENTMCNC: 31.2 GM/DL — LOW (ref 32–36)
MCV RBC AUTO: 88.5 FL — SIGNIFICANT CHANGE UP (ref 80–100)
MONOCYTES # BLD AUTO: 0.56 K/UL — SIGNIFICANT CHANGE UP (ref 0–0.9)
MONOCYTES NFR BLD AUTO: 8.5 % — SIGNIFICANT CHANGE UP (ref 2–14)
NEUTROPHILS # BLD AUTO: 3.46 K/UL — SIGNIFICANT CHANGE UP (ref 1.8–7.4)
NEUTROPHILS NFR BLD AUTO: 52.4 % — SIGNIFICANT CHANGE UP (ref 43–77)
NRBC # BLD: 0 /100 WBCS — SIGNIFICANT CHANGE UP (ref 0–0)
PHOSPHATE SERPL-MCNC: 4.2 MG/DL — SIGNIFICANT CHANGE UP (ref 2.5–4.5)
PLATELET # BLD AUTO: 233 K/UL — SIGNIFICANT CHANGE UP (ref 150–400)
POTASSIUM SERPL-MCNC: 3.9 MMOL/L — SIGNIFICANT CHANGE UP (ref 3.5–5.3)
POTASSIUM SERPL-SCNC: 3.9 MMOL/L — SIGNIFICANT CHANGE UP (ref 3.5–5.3)
PROT SERPL-MCNC: 5.9 G/DL — LOW (ref 6–8.3)
RBC # BLD: 3.66 M/UL — LOW (ref 3.8–5.2)
RBC # FLD: 21.2 % — HIGH (ref 10.3–14.5)
SODIUM SERPL-SCNC: 143 MMOL/L — SIGNIFICANT CHANGE UP (ref 135–145)
WBC # BLD: 6.59 K/UL — SIGNIFICANT CHANGE UP (ref 3.8–10.5)
WBC # FLD AUTO: 6.59 K/UL — SIGNIFICANT CHANGE UP (ref 3.8–10.5)

## 2023-06-14 PROCEDURE — 99233 SBSQ HOSP IP/OBS HIGH 50: CPT

## 2023-06-14 RX ORDER — FUROSEMIDE 40 MG
20 TABLET ORAL ONCE
Refills: 0 | Status: COMPLETED | OUTPATIENT
Start: 2023-06-14 | End: 2023-06-14

## 2023-06-14 RX ORDER — CEFTRIAXONE 500 MG/1
2 INJECTION, POWDER, FOR SOLUTION INTRAMUSCULAR; INTRAVENOUS
Qty: 2 | Refills: 10
Start: 2023-06-14 | End: 2023-10-01

## 2023-06-14 RX ADMIN — Medication 975 MILLIGRAM(S): at 02:08

## 2023-06-14 RX ADMIN — HEPARIN SODIUM 7500 UNIT(S): 5000 INJECTION INTRAVENOUS; SUBCUTANEOUS at 14:12

## 2023-06-14 RX ADMIN — Medication 300 MILLIGRAM(S): at 05:47

## 2023-06-14 RX ADMIN — Medication 975 MILLIGRAM(S): at 18:01

## 2023-06-14 RX ADMIN — Medication 975 MILLIGRAM(S): at 09:46

## 2023-06-14 RX ADMIN — Medication 975 MILLIGRAM(S): at 22:18

## 2023-06-14 RX ADMIN — Medication 20 MILLIGRAM(S): at 00:25

## 2023-06-14 RX ADMIN — Medication 5 MILLIGRAM(S): at 14:41

## 2023-06-14 RX ADMIN — SODIUM CHLORIDE 3 MILLILITER(S): 9 INJECTION INTRAMUSCULAR; INTRAVENOUS; SUBCUTANEOUS at 22:02

## 2023-06-14 RX ADMIN — HEPARIN SODIUM 7500 UNIT(S): 5000 INJECTION INTRAVENOUS; SUBCUTANEOUS at 05:46

## 2023-06-14 RX ADMIN — Medication 20 MILLIGRAM(S): at 22:18

## 2023-06-14 RX ADMIN — CEFTRIAXONE 100 MILLIGRAM(S): 500 INJECTION, POWDER, FOR SOLUTION INTRAMUSCULAR; INTRAVENOUS at 15:05

## 2023-06-14 RX ADMIN — Medication 300 MILLIGRAM(S): at 14:12

## 2023-06-14 RX ADMIN — IRON SUCROSE 110 MILLIGRAM(S): 20 INJECTION, SOLUTION INTRAVENOUS at 12:11

## 2023-06-14 RX ADMIN — SODIUM CHLORIDE 3 MILLILITER(S): 9 INJECTION INTRAMUSCULAR; INTRAVENOUS; SUBCUTANEOUS at 18:01

## 2023-06-14 RX ADMIN — HEPARIN SODIUM 7500 UNIT(S): 5000 INJECTION INTRAVENOUS; SUBCUTANEOUS at 22:18

## 2023-06-14 RX ADMIN — Medication 975 MILLIGRAM(S): at 14:41

## 2023-06-14 RX ADMIN — Medication 300 MILLIGRAM(S): at 22:18

## 2023-06-14 RX ADMIN — Medication 975 MILLIGRAM(S): at 04:39

## 2023-06-14 RX ADMIN — Medication 1 TABLET(S): at 12:12

## 2023-06-14 RX ADMIN — Medication 20 MILLIGRAM(S): at 10:30

## 2023-06-14 RX ADMIN — SODIUM CHLORIDE 3 MILLILITER(S): 9 INJECTION INTRAMUSCULAR; INTRAVENOUS; SUBCUTANEOUS at 00:20

## 2023-06-14 RX ADMIN — Medication 975 MILLIGRAM(S): at 00:47

## 2023-06-14 NOTE — PROGRESS NOTE ADULT - ASSESSMENT
32F  PMH HTN initially admitted to OB L&D on  for induction of labor s/p vaginal delivery  c/b preeclampsia w/ severe features and sepsis in setting of chorioamnionitis and GNR bacteremia 2/2 E.coli on Ceftriaxone. Cardiology consulted given abnormal echocardiogram.     Review of Studies:     EKG 23: NSR    TTE 23:  LVIDd 5.9cm  LVEF 45-50%   Normal right ventricular size and systolic function.  Biatrial enlargement. Moderate MR. PASP  51 mmHg. Small pericardial effusion. Left pleural effusion. Compared to the previous TTE performed on 2023, the LVEF is slightly decreased, there is a new pleural effusion.     TTE 23: LVIDd 5.4cm Normal left ventricular size. Mildly reduced left ventricular systolic function. Grade II DD. Dilated left atrium. Moderate MR. PASP 44 mmHg. Trivial pericardial effusion.      CT PE 23: No evidence of pulmonary artery embolism. Pulmonary venous congestion with small bibasilar pleural effusions and centrilobular septal thickening. No consolidation/ pneumothorax.     #Heart failure in the setting of pre-eclampsia with severe features  Patient with signs/symptoms of pulmonary edema, currently much improved. No history of cardiomyopathy or prior cardiac work up, or concerning family history. Has required IV diuresis for episodes of flash pulmonary edema with significant pulmonary vascular congestion noted on CT PE. Respiratory status appears to have improved with diuresis and initiation of ACEi.  - Appears grossly euvolemic after PO and IV diuresis with lasix - recommend holding off on additional diuresis for now.  - Please increase Enalapril to 5mg BID.  - Labetalol 300mg TID (Hold HR<60 SBP <100)  - Daily weights  - Strict ins and outs    Plan discussed with cardiology consult attending.

## 2023-06-14 NOTE — PROGRESS NOTE ADULT - ATTENDING COMMENTS
Chorioamnionitis/E coli bacteremia.  Febrile last night - Tm 102.3, NIKKI device was removed, afebrile today, WBC starting to trend down.  Would f/u blood cultures from 6/7 for susceptibilities and additional growth, f/u blood culture from 6/8, continue pip-tazo for now.  Will follow with you team 1.
32F w/  s/p vaginal delivery on  now w/ e/o pre-eclampsia w/ severe features: CARYN and pulmonary edema, now improving  -HFpEF - Responded well to Lasix 40 IV x 1 yesterday (net neg ~3L/24hrs), currently on Lasix 20 PO, will give additional Lasix 10 IV x 1 today. Recommend Potassium 40meq PO first dose in anticipation of K+ loss w/ diuresis, f/u repeat evening BMP to monitor electrolytes  -HTN - currently on Labetalol 200 PO BID, agree w/ increase this AM to 200mg TID w/ goal BP <140/90  -PEC w/ severe features - Pulmonary edema responding well to diuresis as noted above; CARYN improving on today's labs 1.27->1.10  -Will continue to follow    Trell Padilla MD  Cardiology Attending
no jvd today  clear lungs  no edema    no need for lasix today  ambulate off oxygen  would increase vasotec to 5 mg bid

## 2023-06-14 NOTE — PROVIDER CONTACT NOTE (CHANGE IN STATUS NOTIFICATION) - ASSESSMENT
Pt O2 sat drops when the patient goes to sleep, lungs were clear upon assessment.
I went into the room and the pt stated they felt SOB. I placed the o2 sat on and was at 90-92%. Upon auscultation lungs sounded clear anterior and posterior. Pt denied any chest pain. OB resident notified.

## 2023-06-14 NOTE — PROGRESS NOTE ADULT - SUBJECTIVE AND OBJECTIVE BOX
Patient evaluated at bedside this morning, resting comfortable in bed. Pt had one episode of desaturation overnight to O2 of 90 during sleep, pt received Lasix 20 PO and is on Labetalol 300 TID at this time as well as Enalapril 5 QD; CTA returned negative for PE and significant for pleural effusions consistent with Echo and CXR;  pt currently comfortable and asymptomatic on 3LNC ; continue to wean and monitor at this timeShe reports pain is well controlled with tylenol and motrin.  She denies headache, dizziness, chest pain, palpitations, shortness of breath, nausea, vomiting, heavy vaginal bleeding or perineal discomfort. Reports decrease in amount of vaginal bleeding and denies clots.  She has been ambulating without assistance, voiding spontaneously.  Tolerating food well, without nausea/vomit.      Physical Exam:  T(C): 36.9 (06-14-23 @ 06:00), Max: 36.9 (06-14-23 @ 06:00)  HR: 88 (06-14-23 @ 06:00) (82 - 93)  BP: 137/85 (06-14-23 @ 06:00) (127/84 - 137/85)  RR: 16 (06-14-23 @ 06:00) (16 - 18)  SpO2: 97% (06-14-23 @ 06:00) (95% - 97%)    GA: NAD, comfortable, conversational  Abd: soft, nontender, nondistended, no rebound or guarding, uterus firm.  Extremities: no swelling or calf tenderness  Perineum: lochia wnl                          10.1   7.55  )-----------( 212      ( 13 Jun 2023 12:30 )             33.3     06-13    142  |  111<H>  |  6<L>  ----------------------------<  88  4.1   |  21<L>  |  1.08    Ca    9.0      13 Jun 2023 12:30  Phos  4.5     06-13  Mg     1.8     06-13    TPro  6.6  /  Alb  3.5  /  TBili  0.5  /  DBili  x   /  AST  21  /  ALT  15  /  AlkPhos  184<H>  06-13    acetaminophen     Tablet .. 975 milliGRAM(s) Oral <User Schedule>  benzocaine 20%/menthol 0.5% Spray 1 Spray(s) Topical every 6 hours PRN  cefTRIAXone   IVPB 2000 milliGRAM(s) IV Intermittent every 24 hours  dibucaine 1% Ointment 1 Application(s) Topical every 6 hours PRN  diphenhydrAMINE 25 milliGRAM(s) Oral every 6 hours PRN  diphtheria/tetanus/pertussis (acellular) Vaccine (Adacel) 0.5 milliLiter(s) IntraMuscular once  enalapril 5 milliGRAM(s) Oral every 24 hours  heparin   Injectable 7500 Unit(s) SubCutaneous every 8 hours  hydrocortisone 1% Cream 1 Application(s) Topical every 6 hours PRN  iron sucrose IVPB 200 milliGRAM(s) IV Intermittent every 24 hours  labetalol 300 milliGRAM(s) Oral every 8 hours  lanolin Ointment 1 Application(s) Topical every 6 hours PRN  magnesium hydroxide Suspension 30 milliLiter(s) Oral two times a day PRN  oxyCODONE    IR 5 milliGRAM(s) Oral once PRN  oxyCODONE    IR 5 milliGRAM(s) Oral every 3 hours PRN  polyethylene glycol 3350 17 Gram(s) Oral daily  pramoxine 1%/zinc 5% Cream 1 Application(s) Topical every 4 hours PRN  prenatal multivitamin 1 Tablet(s) Oral daily  senna 2 Tablet(s) Oral at bedtime  simethicone 80 milliGRAM(s) Chew every 4 hours PRN  sodium chloride 0.9% lock flush 3 milliLiter(s) IV Push every 8 hours  sodium chloride 0.9% lock flush 10 milliLiter(s) IV Push every 1 hour PRN  witch hazel Pads 1 Application(s) Topical every 4 hours PRN

## 2023-06-14 NOTE — PROVIDER CONTACT NOTE (CHANGE IN STATUS NOTIFICATION) - RECOMMENDATIONS
6L of O2 nasal canula, X-ray stat, and EKG.
Ob resident Wilbur ordered Lasix PO and o2 Nasal Canula at 4L for the night.

## 2023-06-14 NOTE — PROGRESS NOTE ADULT - ASSESSMENT
A/P 32y s/p , PPD#6, c/b Sepsis, PPH, PEC w SF, Pulm HTN, Diastolic Dysfunction, CARYN, stable, meeting postpartum milestones ; episode of desaturation overnight, pt received Lasix 20 PO and is on Labetalol 300 TID at this time as well as Enalapril 5 QD; CTA returned negative for PE and significant for pleural effusions consistent with Echo and CXR;  pt currently comfortable and asymptomatic on 3LNC ; continue to wean and monitor at this time    #Sepsis  - Afebrile  - BCx - 6/7 Ecoli bacteremia  - Continue ceftriaxone 2g q 24 hrs  - Appreciate ID recs  - PICC in place; continue to set up for outpt treatment wiht VNS    # PPH  - s/p 2u pRBCs  - s/p NIKKI, double pit, TXA x2, hemabate, cytotec 1000 x 2  - Hb stable and pt hemodynamically stable and asymtpomatic    #PEC w SF  - s/p IV Mg x 12 hr  - Denying toxic symptoms  - Continue labetalol 300 TID and Enalapril 5 QD    #Pulm HTN/Diastolic Dysfunction  - Acutely worsened SOB and evidence of pulmonary edema / effusions overnight, s/p 20mg IV lasix overnight  - F/u cardiology recommendations  - Labetalol increased from 200 TID to 300 TID given cardiology recommendations for tighter BP control  - Consider repeat imaging    #CARYN  - Cr at 1.08; continue to trend today  - Voiding    #PP Care  - Pain: well controlled on tylenol/motrin  - GI: Tolerating regular diet  - : urinating without difficulty/pain  - DVT prophylaxis: ambulating frequently, SQH

## 2023-06-14 NOTE — PROVIDER CONTACT NOTE (CHANGE IN STATUS NOTIFICATION) - BACKGROUND
Pt is post partum day 7, s/p sepsis, s/p mag, PPH, s/p pulmonary embolism. Pt was transferred from ICU  a few days ago.
S/P sepsis protocol, PPH, PRBC transfusion, PE, and Mag.

## 2023-06-14 NOTE — PROGRESS NOTE ADULT - SUBJECTIVE AND OBJECTIVE BOX
INTERVAL HPI/OVERNIGHT EVENTS:    SUBJECTIVE: Patient seen and examined at bedside.    OBJECTIVE:    VITAL SIGNS:  ICU Vital Signs Last 24 Hrs  T(C): 36.7 (14 Jun 2023 10:00), Max: 36.9 (13 Jun 2023 14:00)  T(F): 98.1 (14 Jun 2023 10:00), Max: 98.5 (13 Jun 2023 14:00)  HR: 90 (14 Jun 2023 12:15) (82 - 97)  BP: 135/82 (14 Jun 2023 12:15) (127/84 - 142/91)  BP(mean): --  ABP: --  ABP(mean): --  RR: 18 (14 Jun 2023 12:15) (16 - 18)  SpO2: 99% (14 Jun 2023 12:15) (95% - 99%)    O2 Parameters below as of 14 Jun 2023 06:00  Patient On (Oxygen Delivery Method): nasal cannula              06-13 @ 07:01  -  06-14 @ 07:00  --------------------------------------------------------  IN: 0 mL / OUT: 2800 mL / NET: -2800 mL    06-14 @ 07:01  -  06-14 @ 13:00  --------------------------------------------------------  IN: 0 mL / OUT: 400 mL / NET: -400 mL      CAPILLARY BLOOD GLUCOSE          PHYSICAL EXAM:    General: WDWN ; NAD  HEENT: NC/AT; PERRL, anicteric sclera  Neck: supple, no JVD  Respiratory: CTA B/L; no W/R/R  Cardiovascular: +S1/S2; RRR; no M/R/G  Gastrointestinal: soft, NT/ND; +BS x4  Extremities: WWP; 2+ peripheral pulses B/L; no LE edema  Skin: normal color and turgor; no rash  Neurological:     MEDICATIONS:  MEDICATIONS  (STANDING):  acetaminophen     Tablet .. 975 milliGRAM(s) Oral <User Schedule>  cefTRIAXone   IVPB 2000 milliGRAM(s) IV Intermittent every 24 hours  diphtheria/tetanus/pertussis (acellular) Vaccine (Adacel) 0.5 milliLiter(s) IntraMuscular once  enalapril 5 milliGRAM(s) Oral every 24 hours  heparin   Injectable 7500 Unit(s) SubCutaneous every 8 hours  labetalol 300 milliGRAM(s) Oral every 8 hours  polyethylene glycol 3350 17 Gram(s) Oral daily  prenatal multivitamin 1 Tablet(s) Oral daily  senna 2 Tablet(s) Oral at bedtime  sodium chloride 0.9% lock flush 3 milliLiter(s) IV Push every 8 hours    MEDICATIONS  (PRN):  benzocaine 20%/menthol 0.5% Spray 1 Spray(s) Topical every 6 hours PRN for Perineal discomfort  dibucaine 1% Ointment 1 Application(s) Topical every 6 hours PRN Perineal discomfort  diphenhydrAMINE 25 milliGRAM(s) Oral every 6 hours PRN Pruritus  hydrocortisone 1% Cream 1 Application(s) Topical every 6 hours PRN Moderate Pain (4-6)  lanolin Ointment 1 Application(s) Topical every 6 hours PRN nipple soreness  magnesium hydroxide Suspension 30 milliLiter(s) Oral two times a day PRN Constipation  oxyCODONE    IR 5 milliGRAM(s) Oral once PRN Moderate to Severe Pain (4-10)  oxyCODONE    IR 5 milliGRAM(s) Oral every 3 hours PRN Moderate to Severe Pain (4-10)  pramoxine 1%/zinc 5% Cream 1 Application(s) Topical every 4 hours PRN Moderate Pain (4-6)  simethicone 80 milliGRAM(s) Chew every 4 hours PRN Gas  sodium chloride 0.9% lock flush 10 milliLiter(s) IV Push every 1 hour PRN Pre/post blood products, medications, blood draw, and to maintain line patency  witch hazel Pads 1 Application(s) Topical every 4 hours PRN Perineal discomfort      ALLERGIES:  Allergies    No Known Allergies    Intolerances        LABS:                        10.1   6.59  )-----------( 233      ( 14 Jun 2023 05:30 )             32.4     06-14    143  |  112<H>  |  8   ----------------------------<  83  3.9   |  18<L>  |  0.95    Ca    8.6      14 Jun 2023 05:30  Phos  4.2     06-14  Mg     1.9     06-14    TPro  5.9<L>  /  Alb  3.1<L>  /  TBili  0.4  /  DBili  x   /  AST  19  /  ALT  13  /  AlkPhos  161<H>  06-14    LIVER FUNCTIONS - ( 14 Jun 2023 05:30 )  Alb: 3.1 g/dL / Pro: 5.9 g/dL / ALK PHOS: 161 U/L / ALT: 13 U/L / AST: 19 U/L / GGT: x                     RADIOLOGY & ADDITIONAL TESTS: Reviewed.   INTERVAL HPI/OVERNIGHT EVENTS: One episode of desaturation overnight to O2 of 90 during sleep, pt received Lasix 20 PO.    SUBJECTIVE: Patient seen and examined at bedside. Reports some improvement in her respiratory status. Was able to walk to the bathroom without any shortness of breath off oxygen. 98% on 2LNC.    OBJECTIVE:    VITAL SIGNS:  ICU Vital Signs Last 24 Hrs  T(C): 36.7 (14 Jun 2023 10:00), Max: 36.9 (13 Jun 2023 14:00)  T(F): 98.1 (14 Jun 2023 10:00), Max: 98.5 (13 Jun 2023 14:00)  HR: 90 (14 Jun 2023 12:15) (82 - 97)  BP: 135/82 (14 Jun 2023 12:15) (127/84 - 142/91)  RR: 18 (14 Jun 2023 12:15) (16 - 18)  SpO2: 99% (14 Jun 2023 12:15) (95% - 99%)    O2 Parameters below as of 14 Jun 2023 06:00  Patient On (Oxygen Delivery Method): nasal cannula      06-13 @ 07:01  -  06-14 @ 07:00  --------------------------------------------------------  IN: 0 mL / OUT: 2800 mL / NET: -2800 mL    06-14 @ 07:01  -  06-14 @ 13:00  --------------------------------------------------------  IN: 0 mL / OUT: 400 mL / NET: -400 mL      CAPILLARY BLOOD GLUCOSE      PHYSICAL EXAM:    General: Young AA female in NAD, sitting up in a chair.  HEENT: NC/AT; PERRL, anicteric sclera  Neck: supple, no JVD  Respiratory: Fine bibasilar crackles  Cardiovascular: +S1/S2; RRR; no M/R/G  Gastrointestinal: soft, NT/ND; +BS x4  Extremities: WWP; 2+ peripheral pulses B/L; no LE edema  Skin: normal color and turgor; no rash  Neurological: AAOx3, no focal deficits.    MEDICATIONS:  MEDICATIONS  (STANDING):  acetaminophen     Tablet .. 975 milliGRAM(s) Oral <User Schedule>  cefTRIAXone   IVPB 2000 milliGRAM(s) IV Intermittent every 24 hours  diphtheria/tetanus/pertussis (acellular) Vaccine (Adacel) 0.5 milliLiter(s) IntraMuscular once  enalapril 5 milliGRAM(s) Oral every 24 hours  heparin   Injectable 7500 Unit(s) SubCutaneous every 8 hours  labetalol 300 milliGRAM(s) Oral every 8 hours  polyethylene glycol 3350 17 Gram(s) Oral daily  prenatal multivitamin 1 Tablet(s) Oral daily  senna 2 Tablet(s) Oral at bedtime  sodium chloride 0.9% lock flush 3 milliLiter(s) IV Push every 8 hours    MEDICATIONS  (PRN):  benzocaine 20%/menthol 0.5% Spray 1 Spray(s) Topical every 6 hours PRN for Perineal discomfort  dibucaine 1% Ointment 1 Application(s) Topical every 6 hours PRN Perineal discomfort  diphenhydrAMINE 25 milliGRAM(s) Oral every 6 hours PRN Pruritus  hydrocortisone 1% Cream 1 Application(s) Topical every 6 hours PRN Moderate Pain (4-6)  lanolin Ointment 1 Application(s) Topical every 6 hours PRN nipple soreness  magnesium hydroxide Suspension 30 milliLiter(s) Oral two times a day PRN Constipation  oxyCODONE    IR 5 milliGRAM(s) Oral once PRN Moderate to Severe Pain (4-10)  oxyCODONE    IR 5 milliGRAM(s) Oral every 3 hours PRN Moderate to Severe Pain (4-10)  pramoxine 1%/zinc 5% Cream 1 Application(s) Topical every 4 hours PRN Moderate Pain (4-6)  simethicone 80 milliGRAM(s) Chew every 4 hours PRN Gas  sodium chloride 0.9% lock flush 10 milliLiter(s) IV Push every 1 hour PRN Pre/post blood products, medications, blood draw, and to maintain line patency  witch hazel Pads 1 Application(s) Topical every 4 hours PRN Perineal discomfort      ALLERGIES:  Allergies    No Known Allergies    Intolerances        LABS:                        10.1   6.59  )-----------( 233      ( 14 Jun 2023 05:30 )             32.4     06-14    143  |  112<H>  |  8   ----------------------------<  83  3.9   |  18<L>  |  0.95    Ca    8.6      14 Jun 2023 05:30  Phos  4.2     06-14  Mg     1.9     06-14    TPro  5.9<L>  /  Alb  3.1<L>  /  TBili  0.4  /  DBili  x   /  AST  19  /  ALT  13  /  AlkPhos  161<H>  06-14    LIVER FUNCTIONS - ( 14 Jun 2023 05:30 )  Alb: 3.1 g/dL / Pro: 5.9 g/dL / ALK PHOS: 161 U/L / ALT: 13 U/L / AST: 19 U/L / GGT: x                     RADIOLOGY & ADDITIONAL TESTS: Reviewed.

## 2023-06-14 NOTE — PROVIDER CONTACT NOTE (CHANGE IN STATUS NOTIFICATION) - SITUATION
Pt is pp day 7, pt complained about some SOB and slight chest pain. Pt o2 sat went down to 90% when the pt starts to fall asleep. Pt stated once the O2 sat went back up to 97% the chest pain was resolved.
Pt is post partum day 5 with complications.

## 2023-06-14 NOTE — PROVIDER CONTACT NOTE (CHANGE IN STATUS NOTIFICATION) - ACTION/TREATMENT ORDERED:
Pt is now sating at 97% via nasal cannula, and denied any chest pain. X-ray results pending.
Lasix 20mg, and O2.

## 2023-06-15 LAB
ALBUMIN SERPL ELPH-MCNC: 3.3 G/DL — SIGNIFICANT CHANGE UP (ref 3.3–5)
ALP SERPL-CCNC: 149 U/L — HIGH (ref 40–120)
ALT FLD-CCNC: 12 U/L — SIGNIFICANT CHANGE UP (ref 10–45)
ANION GAP SERPL CALC-SCNC: 12 MMOL/L — SIGNIFICANT CHANGE UP (ref 5–17)
AST SERPL-CCNC: 16 U/L — SIGNIFICANT CHANGE UP (ref 10–40)
BILIRUB SERPL-MCNC: 0.4 MG/DL — SIGNIFICANT CHANGE UP (ref 0.2–1.2)
BUN SERPL-MCNC: 9 MG/DL — SIGNIFICANT CHANGE UP (ref 7–23)
CALCIUM SERPL-MCNC: 8.8 MG/DL — SIGNIFICANT CHANGE UP (ref 8.4–10.5)
CHLORIDE SERPL-SCNC: 110 MMOL/L — HIGH (ref 96–108)
CO2 SERPL-SCNC: 19 MMOL/L — LOW (ref 22–31)
CREAT SERPL-MCNC: 0.94 MG/DL — SIGNIFICANT CHANGE UP (ref 0.5–1.3)
EGFR: 83 ML/MIN/1.73M2 — SIGNIFICANT CHANGE UP
GLUCOSE SERPL-MCNC: 84 MG/DL — SIGNIFICANT CHANGE UP (ref 70–99)
HCT VFR BLD CALC: 33.8 % — LOW (ref 34.5–45)
HGB BLD-MCNC: 10.3 G/DL — LOW (ref 11.5–15.5)
MAGNESIUM SERPL-MCNC: 1.8 MG/DL — SIGNIFICANT CHANGE UP (ref 1.6–2.6)
MCHC RBC-ENTMCNC: 27 PG — SIGNIFICANT CHANGE UP (ref 27–34)
MCHC RBC-ENTMCNC: 30.5 GM/DL — LOW (ref 32–36)
MCV RBC AUTO: 88.5 FL — SIGNIFICANT CHANGE UP (ref 80–100)
NRBC # BLD: 0 /100 WBCS — SIGNIFICANT CHANGE UP (ref 0–0)
PHOSPHATE SERPL-MCNC: 3.9 MG/DL — SIGNIFICANT CHANGE UP (ref 2.5–4.5)
PLATELET # BLD AUTO: 262 K/UL — SIGNIFICANT CHANGE UP (ref 150–400)
POTASSIUM SERPL-MCNC: 3.8 MMOL/L — SIGNIFICANT CHANGE UP (ref 3.5–5.3)
POTASSIUM SERPL-SCNC: 3.8 MMOL/L — SIGNIFICANT CHANGE UP (ref 3.5–5.3)
PROT SERPL-MCNC: 6.3 G/DL — SIGNIFICANT CHANGE UP (ref 6–8.3)
RBC # BLD: 3.82 M/UL — SIGNIFICANT CHANGE UP (ref 3.8–5.2)
RBC # FLD: 22 % — HIGH (ref 10.3–14.5)
SODIUM SERPL-SCNC: 141 MMOL/L — SIGNIFICANT CHANGE UP (ref 135–145)
WBC # BLD: 7.51 K/UL — SIGNIFICANT CHANGE UP (ref 3.8–10.5)
WBC # FLD AUTO: 7.51 K/UL — SIGNIFICANT CHANGE UP (ref 3.8–10.5)

## 2023-06-15 PROCEDURE — 71045 X-RAY EXAM CHEST 1 VIEW: CPT | Mod: 26

## 2023-06-15 RX ORDER — FUROSEMIDE 40 MG
40 TABLET ORAL ONCE
Refills: 0 | Status: COMPLETED | OUTPATIENT
Start: 2023-06-15 | End: 2023-06-15

## 2023-06-15 RX ADMIN — CEFTRIAXONE 100 MILLIGRAM(S): 500 INJECTION, POWDER, FOR SOLUTION INTRAMUSCULAR; INTRAVENOUS at 15:03

## 2023-06-15 RX ADMIN — SODIUM CHLORIDE 3 MILLILITER(S): 9 INJECTION INTRAMUSCULAR; INTRAVENOUS; SUBCUTANEOUS at 06:06

## 2023-06-15 RX ADMIN — HEPARIN SODIUM 7500 UNIT(S): 5000 INJECTION INTRAVENOUS; SUBCUTANEOUS at 13:45

## 2023-06-15 RX ADMIN — Medication 975 MILLIGRAM(S): at 01:12

## 2023-06-15 RX ADMIN — HEPARIN SODIUM 7500 UNIT(S): 5000 INJECTION INTRAVENOUS; SUBCUTANEOUS at 06:22

## 2023-06-15 RX ADMIN — Medication 300 MILLIGRAM(S): at 06:22

## 2023-06-15 RX ADMIN — Medication 1 TABLET(S): at 12:10

## 2023-06-15 RX ADMIN — Medication 40 MILLIGRAM(S): at 16:59

## 2023-06-15 RX ADMIN — Medication 975 MILLIGRAM(S): at 08:47

## 2023-06-15 RX ADMIN — Medication 975 MILLIGRAM(S): at 15:04

## 2023-06-15 RX ADMIN — SODIUM CHLORIDE 3 MILLILITER(S): 9 INJECTION INTRAMUSCULAR; INTRAVENOUS; SUBCUTANEOUS at 22:00

## 2023-06-15 RX ADMIN — Medication 975 MILLIGRAM(S): at 20:58

## 2023-06-15 RX ADMIN — Medication 5 MILLIGRAM(S): at 04:43

## 2023-06-15 RX ADMIN — HEPARIN SODIUM 7500 UNIT(S): 5000 INJECTION INTRAVENOUS; SUBCUTANEOUS at 22:25

## 2023-06-15 RX ADMIN — Medication 5 MILLIGRAM(S): at 17:15

## 2023-06-15 RX ADMIN — Medication 300 MILLIGRAM(S): at 14:02

## 2023-06-15 RX ADMIN — Medication 300 MILLIGRAM(S): at 22:25

## 2023-06-15 NOTE — PROGRESS NOTE ADULT - ASSESSMENT
32F  PMH HTN initially admitted to OB L&D on  for IOL s/p vaginal delivery  c/b preeclampsia w/ severe features and sepsis in setting of chorioamnionitis and GNR bacteremia 2/2 E.coli on Ceftriaxone.     Review of Studies:     EKG 23: NSR    TTE 23:  LVIDd 5.9cm  LVEF 45-50%   Normal right ventricular size and systolic function.  Biatrial enlargement. Moderate MR. PASP  51 mmHg. Small pericardial effusion. Left pleural effusion. Compared to the previous TTE performed on 2023, the LVEF is slightly decreased, there is a new pleural effusion.     TTE 23: LVIDd 5.4cm Normal left ventricular size. Mildly reduced left ventricular systolic function. Grade II DD. Dilated left atrium. Moderate MR. PASP 44 mmHg. Trivial pericardial effusion.      CT PE 23: No evidence of pulmonary artery embolism. Pulmonary venous congestion with small bibasilar pleural effusions and centrilobular septal thickening. No consolidation/ pneumothorax.     #Heart failure in the setting of pre-eclampsia with severe features    - lasix 40 mg po, replete lytes  - enalapril 5 mg bid   - Labetalol 300mg TID (Hold HR<60 SBP <100)

## 2023-06-15 NOTE — PROGRESS NOTE ADULT - SUBJECTIVE AND OBJECTIVE BOX
Overnight, she had an episode of hypoxia s/p lasix with improvement of symptoms.   Today feeling better.  Denies CP, palpitations, SOB, dizziness.

## 2023-06-15 NOTE — PROGRESS NOTE ADULT - SUBJECTIVE AND OBJECTIVE BOX
Patient evaluated at bedside this morning, resting comfortable in bed, on 2LNC  She reports pain is well controlled with tylenol and motrin.  She denies headache, dizziness, chest pain, palpitations, shortness of breath, nausea, vomiting, heavy vaginal bleeding or perineal discomfort. Reports decrease in amount of vaginal bleeding and denies clots.  She has been ambulating without assistance, voiding spontaneously.  Tolerating food well, without nausea/vomit.      Physical Exam:  T(C): 37 (06-15-23 @ 06:00), Max: 37 (06-15-23 @ 06:00)  HR: 90 (06-15-23 @ 06:00) (83 - 91)  BP: 133/84 (06-15-23 @ 06:00) (119/74 - 133/84)  RR: 18 (06-15-23 @ 06:00) (17 - 19)  SpO2: 97% (06-15-23 @ 06:00) (96% - 100%)    GA: NAD, comfortable, conversational  Abd: soft, nontender, nondistended, no rebound or guarding, uterus firm.  Extremities: no swelling or calf tenderness  Perineum: lochia wnl                          10.3   7.51  )-----------( 262      ( 15 Vincent 2023 05:30 )             33.8     06-15    141  |  110<H>  |  9   ----------------------------<  84  3.8   |  19<L>  |  0.94    Ca    8.8      15 Vincent 2023 05:30  Phos  3.9     06-15  Mg     1.8     06-15    TPro  6.3  /  Alb  3.3  /  TBili  0.4  /  DBili  x   /  AST  16  /  ALT  12  /  AlkPhos  149<H>  06-15    acetaminophen     Tablet .. 975 milliGRAM(s) Oral <User Schedule>  benzocaine 20%/menthol 0.5% Spray 1 Spray(s) Topical every 6 hours PRN  cefTRIAXone   IVPB 2000 milliGRAM(s) IV Intermittent every 24 hours  dibucaine 1% Ointment 1 Application(s) Topical every 6 hours PRN  diphenhydrAMINE 25 milliGRAM(s) Oral every 6 hours PRN  diphtheria/tetanus/pertussis (acellular) Vaccine (Adacel) 0.5 milliLiter(s) IntraMuscular once  enalapril 5 milliGRAM(s) Oral every 12 hours  heparin   Injectable 7500 Unit(s) SubCutaneous every 8 hours  hydrocortisone 1% Cream 1 Application(s) Topical every 6 hours PRN  labetalol 300 milliGRAM(s) Oral every 8 hours  lanolin Ointment 1 Application(s) Topical every 6 hours PRN  magnesium hydroxide Suspension 30 milliLiter(s) Oral two times a day PRN  polyethylene glycol 3350 17 Gram(s) Oral daily  pramoxine 1%/zinc 5% Cream 1 Application(s) Topical every 4 hours PRN  prenatal multivitamin 1 Tablet(s) Oral daily  senna 2 Tablet(s) Oral at bedtime  simethicone 80 milliGRAM(s) Chew every 4 hours PRN  sodium chloride 0.9% lock flush 3 milliLiter(s) IV Push every 8 hours  sodium chloride 0.9% lock flush 10 milliLiter(s) IV Push every 1 hour PRN  witch hazel Pads 1 Application(s) Topical every 4 hours PRN

## 2023-06-15 NOTE — PROGRESS NOTE ADULT - ASSESSMENT
A/P 32y s/p , PPD#7, c/b Sepsis, PPH, PEC w SF, Pulm HTN, Diastolic Dysfunction, CARYN, stable, meeting postpartum milestones ; episode of desaturation overnight, pt received IV Lasix 20 overnight and was on 2L NC. PT well controlled on Labetalol 300 TId, Enalapril 5mg BID.  CTA returned negative for PE and significant for pleural effusions consistent with Echo and CXR;  pt currently comfortable  continue to wean and monitor at this time    #Sepsis  - Afebrile  - BCx - 6/ Ecoli bacteremia  - Continue ceftriaxone 2g q 24 hrs  - Appreciate ID recs  - PICC in place; continue to set up for outpt treatment wiht VNS    # PPH  - s/p 2u pRBCs  - s/p NIKKI, double pit, TXA x2, hemabate, cytotec 1000 x 2  - Hb stable and pt hemodynamically stable and asymtpomatic    #PEC w SF  - s/p IV Mg x 12 hr  - Denying toxic symptoms  - Continue labetalol 300 TID and Enalapril 5 BID    #Pulm HTN/Diastolic Dysfunction  - Acutely worsened SOB and evidence of pulmonary edema / effusions overnight, s/p 20mg IV lasix overnight  - F/u cardiology recommendations  - Labetalol increased from 200 TID to 300 TID given cardiology recommendations for tighter BP control, increased Enalapril to 5 BID from QD overnight  - Consider repeat imaging    #CARYN  - Cr at 0.95; continue to trend today  - Voiding    #PP Care  - Pain: well controlled on tylenol/motrin  - GI: Tolerating regular diet  - : urinating without difficulty/pain  - DVT prophylaxis: ambulating frequently, SQH

## 2023-06-16 LAB
ALBUMIN SERPL ELPH-MCNC: 3.4 G/DL — SIGNIFICANT CHANGE UP (ref 3.3–5)
ALP SERPL-CCNC: 147 U/L — HIGH (ref 40–120)
ALT FLD-CCNC: 11 U/L — SIGNIFICANT CHANGE UP (ref 10–45)
ANION GAP SERPL CALC-SCNC: 13 MMOL/L — SIGNIFICANT CHANGE UP (ref 5–17)
AST SERPL-CCNC: 16 U/L — SIGNIFICANT CHANGE UP (ref 10–40)
BASOPHILS # BLD AUTO: 0.04 K/UL — SIGNIFICANT CHANGE UP (ref 0–0.2)
BASOPHILS NFR BLD AUTO: 0.5 % — SIGNIFICANT CHANGE UP (ref 0–2)
BILIRUB SERPL-MCNC: 0.5 MG/DL — SIGNIFICANT CHANGE UP (ref 0.2–1.2)
BUN SERPL-MCNC: 11 MG/DL — SIGNIFICANT CHANGE UP (ref 7–23)
CALCIUM SERPL-MCNC: 9.2 MG/DL — SIGNIFICANT CHANGE UP (ref 8.4–10.5)
CHLORIDE SERPL-SCNC: 112 MMOL/L — HIGH (ref 96–108)
CO2 SERPL-SCNC: 18 MMOL/L — LOW (ref 22–31)
CREAT SERPL-MCNC: 0.91 MG/DL — SIGNIFICANT CHANGE UP (ref 0.5–1.3)
CULTURE RESULTS: SIGNIFICANT CHANGE UP
EGFR: 86 ML/MIN/1.73M2 — SIGNIFICANT CHANGE UP
EOSINOPHIL # BLD AUTO: 0.09 K/UL — SIGNIFICANT CHANGE UP (ref 0–0.5)
EOSINOPHIL NFR BLD AUTO: 1.2 % — SIGNIFICANT CHANGE UP (ref 0–6)
GLUCOSE SERPL-MCNC: 88 MG/DL — SIGNIFICANT CHANGE UP (ref 70–99)
HCT VFR BLD CALC: 34.9 % — SIGNIFICANT CHANGE UP (ref 34.5–45)
HGB BLD-MCNC: 10.5 G/DL — LOW (ref 11.5–15.5)
IMM GRANULOCYTES NFR BLD AUTO: 0.3 % — SIGNIFICANT CHANGE UP (ref 0–0.9)
LYMPHOCYTES # BLD AUTO: 2.2 K/UL — SIGNIFICANT CHANGE UP (ref 1–3.3)
LYMPHOCYTES # BLD AUTO: 30.2 % — SIGNIFICANT CHANGE UP (ref 13–44)
MAGNESIUM SERPL-MCNC: 2 MG/DL — SIGNIFICANT CHANGE UP (ref 1.6–2.6)
MCHC RBC-ENTMCNC: 26.9 PG — LOW (ref 27–34)
MCHC RBC-ENTMCNC: 30.1 GM/DL — LOW (ref 32–36)
MCV RBC AUTO: 89.5 FL — SIGNIFICANT CHANGE UP (ref 80–100)
MONOCYTES # BLD AUTO: 0.76 K/UL — SIGNIFICANT CHANGE UP (ref 0–0.9)
MONOCYTES NFR BLD AUTO: 10.4 % — SIGNIFICANT CHANGE UP (ref 2–14)
NEUTROPHILS # BLD AUTO: 4.18 K/UL — SIGNIFICANT CHANGE UP (ref 1.8–7.4)
NEUTROPHILS NFR BLD AUTO: 57.4 % — SIGNIFICANT CHANGE UP (ref 43–77)
NRBC # BLD: 0 /100 WBCS — SIGNIFICANT CHANGE UP (ref 0–0)
ORGANISM # SPEC MICROSCOPIC CNT: SIGNIFICANT CHANGE UP
PHOSPHATE SERPL-MCNC: 3.9 MG/DL — SIGNIFICANT CHANGE UP (ref 2.5–4.5)
PLATELET # BLD AUTO: 282 K/UL — SIGNIFICANT CHANGE UP (ref 150–400)
POTASSIUM SERPL-MCNC: 4.2 MMOL/L — SIGNIFICANT CHANGE UP (ref 3.5–5.3)
POTASSIUM SERPL-SCNC: 4.2 MMOL/L — SIGNIFICANT CHANGE UP (ref 3.5–5.3)
PROT SERPL-MCNC: 6.7 G/DL — SIGNIFICANT CHANGE UP (ref 6–8.3)
RBC # BLD: 3.9 M/UL — SIGNIFICANT CHANGE UP (ref 3.8–5.2)
RBC # FLD: 21.9 % — HIGH (ref 10.3–14.5)
SODIUM SERPL-SCNC: 143 MMOL/L — SIGNIFICANT CHANGE UP (ref 135–145)
SPECIMEN SOURCE: SIGNIFICANT CHANGE UP
SURGICAL PATHOLOGY STUDY: SIGNIFICANT CHANGE UP
WBC # BLD: 7.29 K/UL — SIGNIFICANT CHANGE UP (ref 3.8–10.5)
WBC # FLD AUTO: 7.29 K/UL — SIGNIFICANT CHANGE UP (ref 3.8–10.5)

## 2023-06-16 RX ORDER — FUROSEMIDE 40 MG
40 TABLET ORAL DAILY
Refills: 0 | Status: DISCONTINUED | OUTPATIENT
Start: 2023-06-16 | End: 2023-06-18

## 2023-06-16 RX ORDER — FUROSEMIDE 40 MG
40 TABLET ORAL ONCE
Refills: 0 | Status: COMPLETED | OUTPATIENT
Start: 2023-06-16 | End: 2023-06-16

## 2023-06-16 RX ORDER — CEFTRIAXONE 500 MG/1
2000 INJECTION, POWDER, FOR SOLUTION INTRAMUSCULAR; INTRAVENOUS EVERY 24 HOURS
Refills: 0 | Status: DISCONTINUED | OUTPATIENT
Start: 2023-06-16 | End: 2023-06-18

## 2023-06-16 RX ADMIN — HEPARIN SODIUM 7500 UNIT(S): 5000 INJECTION INTRAVENOUS; SUBCUTANEOUS at 22:18

## 2023-06-16 RX ADMIN — Medication 975 MILLIGRAM(S): at 20:52

## 2023-06-16 RX ADMIN — Medication 5 MILLIGRAM(S): at 17:03

## 2023-06-16 RX ADMIN — Medication 975 MILLIGRAM(S): at 03:41

## 2023-06-16 RX ADMIN — Medication 975 MILLIGRAM(S): at 09:16

## 2023-06-16 RX ADMIN — HEPARIN SODIUM 7500 UNIT(S): 5000 INJECTION INTRAVENOUS; SUBCUTANEOUS at 06:17

## 2023-06-16 RX ADMIN — HEPARIN SODIUM 7500 UNIT(S): 5000 INJECTION INTRAVENOUS; SUBCUTANEOUS at 15:14

## 2023-06-16 RX ADMIN — CEFTRIAXONE 100 MILLIGRAM(S): 500 INJECTION, POWDER, FOR SOLUTION INTRAMUSCULAR; INTRAVENOUS at 15:14

## 2023-06-16 RX ADMIN — Medication 40 MILLIGRAM(S): at 17:02

## 2023-06-16 RX ADMIN — Medication 300 MILLIGRAM(S): at 22:17

## 2023-06-16 RX ADMIN — Medication 5 MILLIGRAM(S): at 05:21

## 2023-06-16 RX ADMIN — Medication 300 MILLIGRAM(S): at 06:15

## 2023-06-16 RX ADMIN — Medication 300 MILLIGRAM(S): at 15:14

## 2023-06-16 RX ADMIN — Medication 1 TABLET(S): at 12:00

## 2023-06-16 RX ADMIN — SODIUM CHLORIDE 3 MILLILITER(S): 9 INJECTION INTRAMUSCULAR; INTRAVENOUS; SUBCUTANEOUS at 15:00

## 2023-06-16 RX ADMIN — Medication 975 MILLIGRAM(S): at 15:14

## 2023-06-16 NOTE — PROGRESS NOTE ADULT - ASSESSMENT
32F  PMH HTN initially admitted to OB L&D on  for IOL s/p vaginal delivery  c/b preeclampsia w/ severe features and sepsis in setting of chorioamnionitis and GNR bacteremia 2/2 E.coli on Ceftriaxone.     EKG 23: NSR    TTE 23:  LVIDd 5.9cm  LVEF 45-50%   Normal right ventricular size and systolic function.  Biatrial enlargement. Moderate MR. PASP  51 mmHg. Small pericardial effusion. Left pleural effusion. Compared to the previous TTE performed on 2023, the LVEF is slightly decreased, there is a new pleural effusion.     TTE 23: LVIDd 5.4cm Normal left ventricular size. Mildly reduced left ventricular systolic function. Grade II DD. Dilated left atrium. Moderate MR. PASP 44 mmHg. Trivial pericardial effusion.      CT PE 23: No evidence of pulmonary artery embolism. Pulmonary venous congestion with small bibasilar pleural effusions and centrilobular septal thickening. No consolidation/ pneumothorax.     CXR 6/15/23: clear lungs    #Heart failure in the setting of pre-eclampsia with severe features  - lasix 40 mg po, replete lytes prn  - enalapril 5 mg bid   - Labetalol 300mg TID (Hold HR<60 SBP <100)

## 2023-06-16 NOTE — PROGRESS NOTE ADULT - RESPIRATORY
clear to auscultation bilaterally
mild crackles at bases
clear to auscultation bilaterally
decreased breath sound

## 2023-06-16 NOTE — PROGRESS NOTE ADULT - SUBJECTIVE AND OBJECTIVE BOX
Patient evaluated at bedside this morning, resting comfortable in bed, no acute events overnight.  She reports pain is well controlled with tylenol and motrin.  She denies headache, dizziness, chest pain, palpitations, shortness of breath, nausea, vomiting, heavy vaginal bleeding or perineal discomfort. Reports decrease in amount of vaginal bleeding and denies clots.  She has been ambulating without assistance, voiding spontaneously.  Tolerating food well, without nausea/vomit.      Physical Exam:  T(C): 36.7 (06-16-23 @ 05:38), Max: 37.1 (06-15-23 @ 22:00)  HR: 68 (06-16-23 @ 06:00) (68 - 86)  BP: 128/78 (06-16-23 @ 06:00) (124/80 - 144/77)  RR: 18 (06-16-23 @ 05:38) (18 - 18)  SpO2: 99% (06-16-23 @ 05:38) (99% - 100%)    GA: NAD, comfortable, conversational  Abd: soft, nontender, nondistended, no rebound or guarding, uterus firm.  Extremities: no swelling or calf tenderness  Perineum: lochia wnl                          10.5   7.29  )-----------( 282      ( 16 Jun 2023 07:25 )             34.9     06-16    143  |  112<H>  |  11  ----------------------------<  88  4.2   |  18<L>  |  0.91    Ca    9.2      16 Jun 2023 07:25  Phos  3.9     06-16  Mg     2.0     06-16    TPro  6.7  /  Alb  3.4  /  TBili  0.5  /  DBili  x   /  AST  16  /  ALT  11  /  AlkPhos  147<H>  06-16    acetaminophen     Tablet .. 975 milliGRAM(s) Oral <User Schedule>  benzocaine 20%/menthol 0.5% Spray 1 Spray(s) Topical every 6 hours PRN  cefTRIAXone   IVPB 2000 milliGRAM(s) IV Intermittent every 24 hours  dibucaine 1% Ointment 1 Application(s) Topical every 6 hours PRN  diphenhydrAMINE 25 milliGRAM(s) Oral every 6 hours PRN  diphtheria/tetanus/pertussis (acellular) Vaccine (Adacel) 0.5 milliLiter(s) IntraMuscular once  enalapril 5 milliGRAM(s) Oral every 12 hours  furosemide    Tablet 40 milliGRAM(s) Oral daily  heparin   Injectable 7500 Unit(s) SubCutaneous every 8 hours  hydrocortisone 1% Cream 1 Application(s) Topical every 6 hours PRN  labetalol 300 milliGRAM(s) Oral every 8 hours  lanolin Ointment 1 Application(s) Topical every 6 hours PRN  magnesium hydroxide Suspension 30 milliLiter(s) Oral two times a day PRN  polyethylene glycol 3350 17 Gram(s) Oral daily  pramoxine 1%/zinc 5% Cream 1 Application(s) Topical every 4 hours PRN  prenatal multivitamin 1 Tablet(s) Oral daily  senna 2 Tablet(s) Oral at bedtime  simethicone 80 milliGRAM(s) Chew every 4 hours PRN  sodium chloride 0.9% lock flush 3 milliLiter(s) IV Push every 8 hours  sodium chloride 0.9% lock flush 10 milliLiter(s) IV Push every 1 hour PRN  witch hazel Pads 1 Application(s) Topical every 4 hours PRN

## 2023-06-16 NOTE — PROGRESS NOTE ADULT - SUBJECTIVE AND OBJECTIVE BOX
Overnight, she had an episode of hypoxia, asymptomatic  Placed on 2LNC with improvement in sats   Today feeling better.  Denies CP, palpitations, SOB, dizziness.

## 2023-06-16 NOTE — PROGRESS NOTE ADULT - CONSTITUTIONAL
normal/well-groomed/no distress

## 2023-06-16 NOTE — PROGRESS NOTE ADULT - ASSESSMENT
A/P 32y s/p , PPD#7, c/b Sepsis, PPH, PEC w SF, Pulm HTN, Diastolic Dysfunction, CARYN, stable, meeting postpartum milestones ; episode of desaturation overnight, pt received PO Lasix 40 overnight and was on 2L NC. PT well controlled on Labetalol 300 TId, Enalapril 5mg BID.  CTA returned negative for PE and significant for pleural effusions consistent with Echo and CXR;  pt currently comfortable  continue to wean and monitor at this time    #Sepsis  - Afebrile  - BCx - 6/7 Ecoli bacteremia  - Continue ceftriaxone 2g q 24 hrs  - Appreciate ID recs  - PICC in place; continue to set up for outpt treatment wiht VNS    # PPH  - s/p 2u pRBCs  - s/p NIKKI, double pit, TXA x2, hemabate, cytotec 1000 x 2  - Hb stable and pt hemodynamically stable and asymtpomatic    #PEC w SF  - s/p IV Mg x 12 hr  - Denying toxic symptoms  - Continue labetalol 300 TID and Enalapril 5 BID    #Pulm HTN/Diastolic Dysfunction  - Acutely worsened SOB and evidence of pulmonary edema / effusions overnight, s/p 20mg IV lasix overnight  - F/u cardiology recommendations  - Labetalol 300 TID, Enalapril 5 BID fr  - Repeat Chest X-ray shows clear lung bilaterally    #CARYN  - Cr at 0.95; continue to trend today  - Voiding    #PP Care  - Pain: well controlled on tylenol/motrin  - GI: Tolerating regular diet  - : urinating without difficulty/pain  - DVT prophylaxis: ambulating frequently, SQH

## 2023-06-16 NOTE — PROGRESS NOTE ADULT - CARDIOVASCULAR
regular rate and rhythm/S1 S2 present
regular rate and rhythm/pedal edema
regular rate and rhythm/pedal edema
trace edema/regular rate and rhythm

## 2023-06-17 LAB
ALBUMIN SERPL ELPH-MCNC: 3.7 G/DL — SIGNIFICANT CHANGE UP (ref 3.3–5)
ALP SERPL-CCNC: 137 U/L — HIGH (ref 40–120)
ALT FLD-CCNC: 10 U/L — SIGNIFICANT CHANGE UP (ref 10–45)
ANION GAP SERPL CALC-SCNC: 12 MMOL/L — SIGNIFICANT CHANGE UP (ref 5–17)
AST SERPL-CCNC: 16 U/L — SIGNIFICANT CHANGE UP (ref 10–40)
BASOPHILS # BLD AUTO: 0.05 K/UL — SIGNIFICANT CHANGE UP (ref 0–0.2)
BASOPHILS NFR BLD AUTO: 0.8 % — SIGNIFICANT CHANGE UP (ref 0–2)
BILIRUB SERPL-MCNC: 0.4 MG/DL — SIGNIFICANT CHANGE UP (ref 0.2–1.2)
BUN SERPL-MCNC: 14 MG/DL — SIGNIFICANT CHANGE UP (ref 7–23)
CALCIUM SERPL-MCNC: 9.1 MG/DL — SIGNIFICANT CHANGE UP (ref 8.4–10.5)
CHLORIDE SERPL-SCNC: 109 MMOL/L — HIGH (ref 96–108)
CO2 SERPL-SCNC: 19 MMOL/L — LOW (ref 22–31)
CREAT SERPL-MCNC: 0.89 MG/DL — SIGNIFICANT CHANGE UP (ref 0.5–1.3)
EGFR: 88 ML/MIN/1.73M2 — SIGNIFICANT CHANGE UP
EOSINOPHIL # BLD AUTO: 0.09 K/UL — SIGNIFICANT CHANGE UP (ref 0–0.5)
EOSINOPHIL NFR BLD AUTO: 1.4 % — SIGNIFICANT CHANGE UP (ref 0–6)
GLUCOSE SERPL-MCNC: 87 MG/DL — SIGNIFICANT CHANGE UP (ref 70–99)
HCT VFR BLD CALC: 37.5 % — SIGNIFICANT CHANGE UP (ref 34.5–45)
HGB BLD-MCNC: 11.4 G/DL — LOW (ref 11.5–15.5)
IMM GRANULOCYTES NFR BLD AUTO: 0.2 % — SIGNIFICANT CHANGE UP (ref 0–0.9)
LYMPHOCYTES # BLD AUTO: 2.13 K/UL — SIGNIFICANT CHANGE UP (ref 1–3.3)
LYMPHOCYTES # BLD AUTO: 33.9 % — SIGNIFICANT CHANGE UP (ref 13–44)
MAGNESIUM SERPL-MCNC: 1.9 MG/DL — SIGNIFICANT CHANGE UP (ref 1.6–2.6)
MCHC RBC-ENTMCNC: 27 PG — SIGNIFICANT CHANGE UP (ref 27–34)
MCHC RBC-ENTMCNC: 30.4 GM/DL — LOW (ref 32–36)
MCV RBC AUTO: 88.9 FL — SIGNIFICANT CHANGE UP (ref 80–100)
MONOCYTES # BLD AUTO: 0.56 K/UL — SIGNIFICANT CHANGE UP (ref 0–0.9)
MONOCYTES NFR BLD AUTO: 8.9 % — SIGNIFICANT CHANGE UP (ref 2–14)
NEUTROPHILS # BLD AUTO: 3.45 K/UL — SIGNIFICANT CHANGE UP (ref 1.8–7.4)
NEUTROPHILS NFR BLD AUTO: 54.8 % — SIGNIFICANT CHANGE UP (ref 43–77)
NRBC # BLD: 0 /100 WBCS — SIGNIFICANT CHANGE UP (ref 0–0)
PHOSPHATE SERPL-MCNC: 4 MG/DL — SIGNIFICANT CHANGE UP (ref 2.5–4.5)
PLATELET # BLD AUTO: 289 K/UL — SIGNIFICANT CHANGE UP (ref 150–400)
POTASSIUM SERPL-MCNC: 4.2 MMOL/L — SIGNIFICANT CHANGE UP (ref 3.5–5.3)
POTASSIUM SERPL-SCNC: 4.2 MMOL/L — SIGNIFICANT CHANGE UP (ref 3.5–5.3)
PROT SERPL-MCNC: 7.2 G/DL — SIGNIFICANT CHANGE UP (ref 6–8.3)
RBC # BLD: 4.22 M/UL — SIGNIFICANT CHANGE UP (ref 3.8–5.2)
RBC # FLD: 21.6 % — HIGH (ref 10.3–14.5)
SODIUM SERPL-SCNC: 140 MMOL/L — SIGNIFICANT CHANGE UP (ref 135–145)
WBC # BLD: 6.29 K/UL — SIGNIFICANT CHANGE UP (ref 3.8–10.5)
WBC # FLD AUTO: 6.29 K/UL — SIGNIFICANT CHANGE UP (ref 3.8–10.5)

## 2023-06-17 RX ADMIN — HEPARIN SODIUM 7500 UNIT(S): 5000 INJECTION INTRAVENOUS; SUBCUTANEOUS at 20:53

## 2023-06-17 RX ADMIN — SODIUM CHLORIDE 3 MILLILITER(S): 9 INJECTION INTRAMUSCULAR; INTRAVENOUS; SUBCUTANEOUS at 14:30

## 2023-06-17 RX ADMIN — Medication 40 MILLIGRAM(S): at 05:53

## 2023-06-17 RX ADMIN — SODIUM CHLORIDE 3 MILLILITER(S): 9 INJECTION INTRAMUSCULAR; INTRAVENOUS; SUBCUTANEOUS at 06:00

## 2023-06-17 RX ADMIN — Medication 975 MILLIGRAM(S): at 15:33

## 2023-06-17 RX ADMIN — Medication 5 MILLIGRAM(S): at 17:18

## 2023-06-17 RX ADMIN — CEFTRIAXONE 100 MILLIGRAM(S): 500 INJECTION, POWDER, FOR SOLUTION INTRAMUSCULAR; INTRAVENOUS at 15:34

## 2023-06-17 RX ADMIN — Medication 300 MILLIGRAM(S): at 07:20

## 2023-06-17 RX ADMIN — Medication 975 MILLIGRAM(S): at 20:54

## 2023-06-17 RX ADMIN — HEPARIN SODIUM 7500 UNIT(S): 5000 INJECTION INTRAVENOUS; SUBCUTANEOUS at 14:28

## 2023-06-17 RX ADMIN — Medication 975 MILLIGRAM(S): at 03:10

## 2023-06-17 RX ADMIN — Medication 975 MILLIGRAM(S): at 09:27

## 2023-06-17 RX ADMIN — Medication 1 TABLET(S): at 11:54

## 2023-06-17 RX ADMIN — Medication 5 MILLIGRAM(S): at 05:53

## 2023-06-17 RX ADMIN — Medication 300 MILLIGRAM(S): at 14:29

## 2023-06-17 RX ADMIN — Medication 300 MILLIGRAM(S): at 20:53

## 2023-06-17 RX ADMIN — HEPARIN SODIUM 7500 UNIT(S): 5000 INJECTION INTRAVENOUS; SUBCUTANEOUS at 05:53

## 2023-06-17 NOTE — PROGRESS NOTE ADULT - SUBJECTIVE AND OBJECTIVE BOX
Patient evaluated at bedside this morning, resting comfortable in bed, no acute events overnight.  She reports pain is well controlled with tylenol and motrin.  She denies headache, dizziness, chest pain, palpitations, shortness of breath, nausea, vomiting, heavy vaginal bleeding or perineal discomfort. Reports decrease in amount of vaginal bleeding and denies clots.  She has been ambulating without assistance, voiding spontaneously.  Tolerating food well, without nausea/vomit.      Physical Exam:  T(C): 36.9 (06-17-23 @ 06:00), Max: 36.9 (06-16-23 @ 22:00)  HR: 79 (06-17-23 @ 06:00) (70 - 79)  BP: 113/75 (06-17-23 @ 06:00) (105/69 - 118/82)  RR: 18 (06-17-23 @ 06:00) (18 - 18)  SpO2: 99% (06-17-23 @ 06:00) (98% - 100%)    GA: NAD, comfortable, conversational  Abd: soft, nontender, nondistended, no rebound or guarding, uterus firm.  Extremities: no swelling or calf tenderness  Perineum: lochia wnl                          10.5   7.29  )-----------( 282      ( 16 Jun 2023 07:25 )             34.9     06-16    143  |  112<H>  |  11  ----------------------------<  88  4.2   |  18<L>  |  0.91    Ca    9.2      16 Jun 2023 07:25  Phos  3.9     06-16  Mg     2.0     06-16    TPro  6.7  /  Alb  3.4  /  TBili  0.5  /  DBili  x   /  AST  16  /  ALT  11  /  AlkPhos  147<H>  06-16    acetaminophen     Tablet .. 975 milliGRAM(s) Oral <User Schedule>  benzocaine 20%/menthol 0.5% Spray 1 Spray(s) Topical every 6 hours PRN  cefTRIAXone   IVPB 2000 milliGRAM(s) IV Intermittent every 24 hours  dibucaine 1% Ointment 1 Application(s) Topical every 6 hours PRN  diphenhydrAMINE 25 milliGRAM(s) Oral every 6 hours PRN  diphtheria/tetanus/pertussis (acellular) Vaccine (Adacel) 0.5 milliLiter(s) IntraMuscular once  enalapril 5 milliGRAM(s) Oral every 12 hours  furosemide    Tablet 40 milliGRAM(s) Oral daily  heparin   Injectable 7500 Unit(s) SubCutaneous every 8 hours  hydrocortisone 1% Cream 1 Application(s) Topical every 6 hours PRN  labetalol 300 milliGRAM(s) Oral every 8 hours  lanolin Ointment 1 Application(s) Topical every 6 hours PRN  magnesium hydroxide Suspension 30 milliLiter(s) Oral two times a day PRN  polyethylene glycol 3350 17 Gram(s) Oral daily  pramoxine 1%/zinc 5% Cream 1 Application(s) Topical every 4 hours PRN  prenatal multivitamin 1 Tablet(s) Oral daily  senna 2 Tablet(s) Oral at bedtime  simethicone 80 milliGRAM(s) Chew every 4 hours PRN  sodium chloride 0.9% lock flush 3 milliLiter(s) IV Push every 8 hours  sodium chloride 0.9% lock flush 10 milliLiter(s) IV Push every 1 hour PRN  witch hazel Pads 1 Application(s) Topical every 4 hours PRN

## 2023-06-17 NOTE — PROGRESS NOTE ADULT - ASSESSMENT
A/P 32y s/p , PPD#8, c/b Sepsis, PPH, PEC w SF, Pulm HTN, Diastolic Dysfunction, CARYN, stable, meeting postpartum milestones ; episode of desaturation overnight, pt received PO Lasix 40 overnight and was on room air yesterday and overnight. PT well controlled on Labetalol 300 TId, Enalapril 5mg BID.  CTA returned negative for PE and significant for pleural effusions consistent with Echo and CXR;repeat CXR showed pleural effusions resolved and clear lung field bilaterally  pt currently comfortable     #Sepsis  - Afebrile  - BCx -  Ecoli bacteremia  - Continue ceftriaxone 2g q 24 hrs  - Appreciate ID recs  - PICC in place; continue to set up for outpt treatment wiht VNS    # PPH  - s/p 2u pRBCs  - s/p NIKKI, double pit, TXA x2, hemabate, cytotec 1000 x 2  - Hb stable and pt hemodynamically stable and asymtpomatic    #PEC w SF  - s/p IV Mg x 12 hr  - Denying toxic symptoms  - Continue labetalol 300 TID and Enalapril 5 BID    #Pulm HTN/Diastolic Dysfunction  - SOb improved and pt has been asymptomatic for 24 hrs on room air  - F/u cardiology recommendations  - Labetalol 300 TID, Enalapril 5 BID fr      #CARYN  - Cr at 0.91; continue to trend today  - Voiding    #PP Care  - Pain: well controlled on tylenol/motrin  - GI: Tolerating regular diet  - : urinating without difficulty/pain  - DVT prophylaxis: ambulating frequently, SQH

## 2023-06-18 ENCOUNTER — TRANSCRIPTION ENCOUNTER (OUTPATIENT)
Age: 33
End: 2023-06-18

## 2023-06-18 VITALS
HEART RATE: 79 BPM | DIASTOLIC BLOOD PRESSURE: 75 MMHG | SYSTOLIC BLOOD PRESSURE: 118 MMHG | OXYGEN SATURATION: 99 % | RESPIRATION RATE: 18 BRPM | TEMPERATURE: 98 F

## 2023-06-18 DIAGNOSIS — R78.81 BACTEREMIA: ICD-10-CM

## 2023-06-18 DIAGNOSIS — I51.9 HEART DISEASE, UNSPECIFIED: ICD-10-CM

## 2023-06-18 LAB
ANION GAP SERPL CALC-SCNC: 13 MMOL/L — SIGNIFICANT CHANGE UP (ref 5–17)
BUN SERPL-MCNC: 14 MG/DL — SIGNIFICANT CHANGE UP (ref 7–23)
CALCIUM SERPL-MCNC: 9.6 MG/DL — SIGNIFICANT CHANGE UP (ref 8.4–10.5)
CHLORIDE SERPL-SCNC: 111 MMOL/L — HIGH (ref 96–108)
CO2 SERPL-SCNC: 20 MMOL/L — LOW (ref 22–31)
CREAT SERPL-MCNC: 0.97 MG/DL — SIGNIFICANT CHANGE UP (ref 0.5–1.3)
EGFR: 80 ML/MIN/1.73M2 — SIGNIFICANT CHANGE UP
GLUCOSE SERPL-MCNC: 103 MG/DL — HIGH (ref 70–99)
POTASSIUM SERPL-MCNC: 4.4 MMOL/L — SIGNIFICANT CHANGE UP (ref 3.5–5.3)
POTASSIUM SERPL-SCNC: 4.4 MMOL/L — SIGNIFICANT CHANGE UP (ref 3.5–5.3)
SODIUM SERPL-SCNC: 144 MMOL/L — SIGNIFICANT CHANGE UP (ref 135–145)

## 2023-06-18 PROCEDURE — 87150 DNA/RNA AMPLIFIED PROBE: CPT

## 2023-06-18 PROCEDURE — 71045 X-RAY EXAM CHEST 1 VIEW: CPT

## 2023-06-18 PROCEDURE — 83880 ASSAY OF NATRIURETIC PEPTIDE: CPT

## 2023-06-18 PROCEDURE — 84100 ASSAY OF PHOSPHORUS: CPT

## 2023-06-18 PROCEDURE — 83615 LACTATE (LD) (LDH) ENZYME: CPT

## 2023-06-18 PROCEDURE — 71275 CT ANGIOGRAPHY CHEST: CPT

## 2023-06-18 PROCEDURE — 85027 COMPLETE CBC AUTOMATED: CPT

## 2023-06-18 PROCEDURE — 85025 COMPLETE CBC W/AUTO DIFF WBC: CPT

## 2023-06-18 PROCEDURE — 93970 EXTREMITY STUDY: CPT

## 2023-06-18 PROCEDURE — 80053 COMPREHEN METABOLIC PANEL: CPT

## 2023-06-18 PROCEDURE — 86923 COMPATIBILITY TEST ELECTRIC: CPT

## 2023-06-18 PROCEDURE — 84156 ASSAY OF PROTEIN URINE: CPT

## 2023-06-18 PROCEDURE — 86900 BLOOD TYPING SEROLOGIC ABO: CPT

## 2023-06-18 PROCEDURE — 81001 URINALYSIS AUTO W/SCOPE: CPT

## 2023-06-18 PROCEDURE — P9016: CPT

## 2023-06-18 PROCEDURE — 88307 TISSUE EXAM BY PATHOLOGIST: CPT

## 2023-06-18 PROCEDURE — 86901 BLOOD TYPING SEROLOGIC RH(D): CPT

## 2023-06-18 PROCEDURE — 83010 ASSAY OF HAPTOGLOBIN QUANT: CPT

## 2023-06-18 PROCEDURE — 82570 ASSAY OF URINE CREATININE: CPT

## 2023-06-18 PROCEDURE — 86780 TREPONEMA PALLIDUM: CPT

## 2023-06-18 PROCEDURE — 86850 RBC ANTIBODY SCREEN: CPT

## 2023-06-18 PROCEDURE — 82803 BLOOD GASES ANY COMBINATION: CPT

## 2023-06-18 PROCEDURE — 87040 BLOOD CULTURE FOR BACTERIA: CPT

## 2023-06-18 PROCEDURE — 59050 FETAL MONITOR W/REPORT: CPT

## 2023-06-18 PROCEDURE — 87186 SC STD MICRODIL/AGAR DIL: CPT

## 2023-06-18 PROCEDURE — 82330 ASSAY OF CALCIUM: CPT

## 2023-06-18 PROCEDURE — 84145 PROCALCITONIN (PCT): CPT

## 2023-06-18 PROCEDURE — 84300 ASSAY OF URINE SODIUM: CPT

## 2023-06-18 PROCEDURE — 80048 BASIC METABOLIC PNL TOTAL CA: CPT

## 2023-06-18 PROCEDURE — 93306 TTE W/DOPPLER COMPLETE: CPT

## 2023-06-18 PROCEDURE — 83735 ASSAY OF MAGNESIUM: CPT

## 2023-06-18 PROCEDURE — 84132 ASSAY OF SERUM POTASSIUM: CPT

## 2023-06-18 PROCEDURE — 85610 PROTHROMBIN TIME: CPT

## 2023-06-18 PROCEDURE — 36430 TRANSFUSION BLD/BLD COMPNT: CPT

## 2023-06-18 PROCEDURE — 83605 ASSAY OF LACTIC ACID: CPT

## 2023-06-18 PROCEDURE — 36410 VNPNXR 3YR/> PHY/QHP DX/THER: CPT

## 2023-06-18 PROCEDURE — 99214 OFFICE O/P EST MOD 30 MIN: CPT

## 2023-06-18 PROCEDURE — 84484 ASSAY OF TROPONIN QUANT: CPT

## 2023-06-18 PROCEDURE — 83935 ASSAY OF URINE OSMOLALITY: CPT

## 2023-06-18 PROCEDURE — 84550 ASSAY OF BLOOD/URIC ACID: CPT

## 2023-06-18 PROCEDURE — 85384 FIBRINOGEN ACTIVITY: CPT

## 2023-06-18 PROCEDURE — 85730 THROMBOPLASTIN TIME PARTIAL: CPT

## 2023-06-18 PROCEDURE — 36415 COLL VENOUS BLD VENIPUNCTURE: CPT

## 2023-06-18 PROCEDURE — 84295 ASSAY OF SERUM SODIUM: CPT

## 2023-06-18 PROCEDURE — 86769 SARS-COV-2 COVID-19 ANTIBODY: CPT

## 2023-06-18 PROCEDURE — 76937 US GUIDE VASCULAR ACCESS: CPT

## 2023-06-18 RX ORDER — FUROSEMIDE 40 MG
1 TABLET ORAL
Qty: 0 | Refills: 0 | DISCHARGE
Start: 2023-06-18

## 2023-06-18 RX ORDER — LABETALOL HCL 100 MG
1 TABLET ORAL
Qty: 0 | Refills: 0 | DISCHARGE
Start: 2023-06-18

## 2023-06-18 RX ORDER — FUROSEMIDE 40 MG
1 TABLET ORAL
Qty: 7 | Refills: 1
Start: 2023-06-18 | End: 2023-07-01

## 2023-06-18 RX ORDER — LABETALOL HCL 100 MG
1 TABLET ORAL
Qty: 63 | Refills: 0
Start: 2023-06-18 | End: 2023-07-08

## 2023-06-18 RX ORDER — FUROSEMIDE 40 MG
1 TABLET ORAL
Refills: 0 | DISCHARGE

## 2023-06-18 RX ORDER — ACETAMINOPHEN 500 MG
3 TABLET ORAL
Qty: 0 | Refills: 0 | DISCHARGE
Start: 2023-06-18

## 2023-06-18 RX ORDER — FUROSEMIDE 40 MG
1 TABLET ORAL
Qty: 21 | Refills: 0
Start: 2023-06-18 | End: 2023-07-08

## 2023-06-18 RX ORDER — FUROSEMIDE 40 MG
1 TABLET ORAL
Qty: 7 | Refills: 0
Start: 2023-06-18 | End: 2023-06-24

## 2023-06-18 RX ORDER — LABETALOL HCL 100 MG
1 TABLET ORAL
Qty: 900 | Refills: 0
Start: 2023-06-18 | End: 2024-04-12

## 2023-06-18 RX ADMIN — CEFTRIAXONE 100 MILLIGRAM(S): 500 INJECTION, POWDER, FOR SOLUTION INTRAMUSCULAR; INTRAVENOUS at 15:00

## 2023-06-18 RX ADMIN — SODIUM CHLORIDE 3 MILLILITER(S): 9 INJECTION INTRAMUSCULAR; INTRAVENOUS; SUBCUTANEOUS at 15:00

## 2023-06-18 RX ADMIN — Medication 5 MILLIGRAM(S): at 04:06

## 2023-06-18 RX ADMIN — HEPARIN SODIUM 7500 UNIT(S): 5000 INJECTION INTRAVENOUS; SUBCUTANEOUS at 06:19

## 2023-06-18 RX ADMIN — Medication 975 MILLIGRAM(S): at 04:04

## 2023-06-18 RX ADMIN — Medication 40 MILLIGRAM(S): at 06:19

## 2023-06-18 RX ADMIN — Medication 1 TABLET(S): at 13:41

## 2023-06-18 RX ADMIN — Medication 300 MILLIGRAM(S): at 06:20

## 2023-06-18 RX ADMIN — Medication 300 MILLIGRAM(S): at 13:41

## 2023-06-18 RX ADMIN — HEPARIN SODIUM 7500 UNIT(S): 5000 INJECTION INTRAVENOUS; SUBCUTANEOUS at 13:41

## 2023-06-18 NOTE — DISCHARGE NOTE OB - MEDICATION SUMMARY - MEDICATIONS TO TAKE
I will START or STAY ON the medications listed below when I get home from the hospital:    acetaminophen 325 mg oral tablet  -- 3 tab(s) by mouth every 6 hours  -- Indication: For Pospartum pain    enalapril 5 mg oral tablet  -- 1 tab(s) by mouth every 12 hours  -- Indication: For PEC w/ SF    labetalol 300 mg oral tablet  -- 1 tab(s) by mouth every 8 hours  -- Indication: For PEC w/ SF    cefTRIAXone 2 g injection  -- 2 gram(s) intravenously once a day Ceftriaxone 2g Intravenous once daily until 6/24  -- Indication: For E. coli bacteremia, sepsis    furosemide 40 mg oral tablet  -- 1 tab(s) by mouth once a day  -- Indication: For Pulmonary Edema    Prenatal Multivitamins with Folic Acid 1 mg oral tablet  -- 1 tab(s) by mouth once a day  -- Indication: For Postpartum state   I will START or STAY ON the medications listed below when I get home from the hospital:    acetaminophen 325 mg oral tablet  -- 3 tab(s) by mouth every 6 hours  -- Indication: For Postpartum pain    enalapril 5 mg oral tablet  -- 1 tab(s) by mouth every 12 hours  -- Indication: For PEC w/ SF    labetalol 300 mg oral tablet  -- 1 tab(s) by mouth every 8 hours  -- Indication: For PEC w/ SF    cefTRIAXone 2 g injection  -- 2 gram(s) intravenously once a day Ceftriaxone 2g Intravenous once daily until 6/24  -- Indication: For Bacteremia    furosemide 20 mg oral tablet  -- 1 tab(s) by mouth once a day  -- Indication: For Pulmonary edema    Prenatal Multivitamins with Folic Acid 1 mg oral tablet  -- 1 tab(s) by mouth once a day  -- Indication: For Postpartum state

## 2023-06-18 NOTE — DISCHARGE NOTE OB - HOSPITAL COURSE
32 y.o.  at 38+5 s/p  c/b sepsis 2/2 chorio and e.coli bacteremia, PEC w/ SF, pulmonary edema. Pt was induced for PEC w/ SF and during her intrapartum course had severe range BPs, scotoma, and elevated creatinine. Pt was started on IV Mag at the time. Simultaneously she was febrile and developed chorioamnionitis. PT remained afebrile and blood cultures that were drawn speciated E. Coli. PT received IV A/G. After blood cultures return positive, ID was consulted and recommended Zosyn and then Ceftriaxone 2g QD which is pts current regimen. Pt ultimately delivered vaginally and had a PPH due to her multiple risk factors. Pt received 2 units pRBC, double pit, hemabate and had a NIKKI put in place. NIKKI was removed after approx half a day. Bleeding was well controlled and pt has been hemodynamically stable since. During her post partum course, pt began to have shortness of breath and as found to have pulmonary edema. At this point, MICU was consulted and pt was in MICU care for 24 hours for medical optimization. Pt had CXR showing pulmonary effusions and Echo showing LV diastolic dysfunction with PA systolic pressure of 44mmHG.  Cardiology was consulted and advised PEC can be associated iwht diastolic dysfunction so stress induced cardiomyopathy is possible but no peripartum cardiomyopathy as EF was >44-45%. After diuresis with lasix, pt improved and repeat chest x-ray demonstrated clear lung fields bilaterally. PT is now asymptomatic, hemodynamically stable and doing well on day of discharge.

## 2023-06-18 NOTE — DISCHARGE NOTE OB - PATIENT PORTAL LINK FT
You can access the FollowMyHealth Patient Portal offered by [facility] by registering at the following website: [email or web address]. By joining [facility] [employer]’s FollowMyHealth portal, you will also be able to view your health information using other applications (apps) compatible with our system.

## 2023-06-18 NOTE — PROGRESS NOTE ADULT - PROVIDER SPECIALTY LIST ADULT
Cardiology
Infectious Disease
MICU
MICU
Cardio-OB
Cardiology
Infectious Disease
Infectious Disease
OB
Cardio-OB
OB
Cardio-OB
OB
Cardio-OB

## 2023-06-18 NOTE — DISCHARGE NOTE OB - MEDICATION SUMMARY - MEDICATIONS TO CHANGE
I will SWITCH the dose or number of times a day I take the medications listed below when I get home from the hospital:    cefTRIAXone 2 g injection  -- 2 gram(s) intravenously once a day Ceftriaxone 2g Intravenous once daily until 6/24    Please call [facility] [phone] with any questions regarding orders.  -- CTX 2g qd til 6/24   I will SWITCH the dose or number of times a day I take the medications listed below when I get home from the hospital:  None

## 2023-06-18 NOTE — DISCHARGE NOTE OB - DO NOT DIET OR “STARVE” YOURSELF INTO GETTING BACK TO PRE-PREGNANCY SHAPE
Hx of COPD with last PFTs showing FEV1/FVC 41%; FEV1 33%  · On 2L O2 at home  · On Incruse and Jamse Crow at home  · Admitted for acute on chronic hypoxic hypercapnic respiratory failure thought to have component of COPD exacerbation  · Currently on Xopenex 1 25 mg TID, Atrovent 0 5 mg TID, Perforomist 20 mcg q12 and Pulmicort 0 5 mg q12  · Per ICU/pulmonology transfer note, monitor off systemic steroids for now, recently completed steroid course several days ago for COPD exacerbation Statement Selected

## 2023-06-18 NOTE — DISCHARGE NOTE OB - NS MD DC FALL RISK RISK
For information on Fall & Injury Prevention, visit: [email or web address] OR  [email or web address] OR  https://www.cdc.gov/steadi/patient.html

## 2023-06-18 NOTE — DISCHARGE NOTE OB - PROVIDER TOKENS
PROVIDER:[TOKEN:[[id number]]],PROVIDER:[TOKEN:[[id number]]],PROVIDER:[TOKEN:[[id number]]]

## 2023-06-18 NOTE — PROGRESS NOTE ADULT - SUBJECTIVE AND OBJECTIVE BOX
Patient evaluated at bedside this morning, resting comfortable in bed, no acute events overnight.  She reports pain is well controlled with tylenol and motrin.  She denies headache, vision change,s dizziness, chest pain, palpitations, shortness of breath, nausea, vomiting, fever, chills, RUQ/epigastric pain, heavy vaginal bleeding. She has been ambulating without assistance, voiding spontaneously.  Tolerating food well, without nausea/vomit.      Physical Exam:  T(C): 36.3 (06-18-23 @ 04:00), Max: 36.7 (06-18-23 @ 02:00)  HR: 78 (06-18-23 @ 06:00) (68 - 78)  BP: 145/93 (06-18-23 @ 06:00) (123/83 - 145/93)  RR: 18 (06-18-23 @ 06:00) (18 - 19)  SpO2: 98% (06-18-23 @ 06:00) (98% - 100%)    GA: NAD, A&O x 3  Pulm: no increased work of breathing  Abd: soft, nontender, nondistended, no rebound or guarding, uterus firm.  Extremities: no swelling or calf tenderness                          11.4   6.29  )-----------( 289      ( 17 Jun 2023 05:30 )             37.5     06-17    140  |  109<H>  |  14  ----------------------------<  87  4.2   |  19<L>  |  0.89    Ca    9.1      17 Jun 2023 05:30  Phos  4.0     06-17  Mg     1.9     06-17    TPro  7.2  /  Alb  3.7  /  TBili  0.4  /  DBili  x   /  AST  16  /  ALT  10  /  AlkPhos  137<H>  06-17    acetaminophen     Tablet .. 975 milliGRAM(s) Oral <User Schedule>  benzocaine 20%/menthol 0.5% Spray 1 Spray(s) Topical every 6 hours PRN  cefTRIAXone   IVPB 2000 milliGRAM(s) IV Intermittent every 24 hours  dibucaine 1% Ointment 1 Application(s) Topical every 6 hours PRN  diphenhydrAMINE 25 milliGRAM(s) Oral every 6 hours PRN  diphtheria/tetanus/pertussis (acellular) Vaccine (Adacel) 0.5 milliLiter(s) IntraMuscular once  enalapril 5 milliGRAM(s) Oral every 12 hours  furosemide    Tablet 40 milliGRAM(s) Oral daily  heparin   Injectable 7500 Unit(s) SubCutaneous every 8 hours  hydrocortisone 1% Cream 1 Application(s) Topical every 6 hours PRN  labetalol 300 milliGRAM(s) Oral every 8 hours  lanolin Ointment 1 Application(s) Topical every 6 hours PRN  magnesium hydroxide Suspension 30 milliLiter(s) Oral two times a day PRN  polyethylene glycol 3350 17 Gram(s) Oral daily  pramoxine 1%/zinc 5% Cream 1 Application(s) Topical every 4 hours PRN  prenatal multivitamin 1 Tablet(s) Oral daily  senna 2 Tablet(s) Oral at bedtime  simethicone 80 milliGRAM(s) Chew every 4 hours PRN  sodium chloride 0.9% lock flush 3 milliLiter(s) IV Push every 8 hours  sodium chloride 0.9% lock flush 10 milliLiter(s) IV Push every 1 hour PRN  witch hazel Pads 1 Application(s) Topical every 4 hours PRN

## 2023-06-18 NOTE — PROGRESS NOTE ADULT - ASSESSMENT
A/P 32y s/p , PPD#10, c/b Sepsis, PPH, PEC w SF, Pulm HTN, Diastolic Dysfunction, CARYN, stable, meeting postpartum milestones ; episode of desaturation overnight , pt received PO Lasix 40 overnight and was on 2L NC. PT well controlled on Labetalol 300 TID, Enalapril 5mg BID.  CTA returned negative for PE and significant for pleural effusions consistent with Echo and CXR;  pt currently comfortable  on RA    #Sepsis  - Afebrile  - BCx - / Ecoli bacteremia  - Continue ceftriaxone 2g q 24 hrs for total of 14d abx  - Appreciate ID recs  - PICC in place; continue to set up for outpt treatment with VNS    # PPH  - s/p 2u pRBCs  - s/p NIKKI, double pit, TXA x2, hemabate, cytotec 1000 x 2  - Hb stable and pt hemodynamically stable and asymtpomatic    #PEC w SF  - s/p IV Mg x 12 hr  - Denying toxic symptoms  - Continue labetalol 300 TID and Enalapril 5 BID    #Pulm HTN/Diastolic Dysfunction  - Acutely worsened SOB and evidence of pulmonary edema / effusions 2 nights ago, currently taking lasix 40 PO qd. Now feeling well and satting % on RA  - F/u cardiology recommendations  - Labetalol 300 TID, Enalapril 5 BID fr  - Repeat Chest X-ray shows clear lung bilaterally    #CARYN (resolved)  - Cr at 0.0.8  - Voiding    #PP Care  - Pain: well controlled on tylenol/motrin  - GI: Tolerating regular diet  - : urinating without difficulty/pain  - DVT prophylaxis: ambulating frequently, SQH

## 2023-06-18 NOTE — DISCHARGE NOTE OB - CARE PROVIDER_API CALL
Anjana Capps  Infectious Disease  [address] [place] [address]  Phone: [phone]  Fax: [phone]  Follow Up Time:     Lorna Leone  Cardiovascular Disease  [address] [place] [id number]  Phone: [phone]  Fax: [phone]  Follow Up Time:     Richard Tran  Obstetrics and Gynecology  [address] [place] [address]  Phone: [phone]  Fax: [phone]  Follow Up Time:

## 2023-06-18 NOTE — PROGRESS NOTE ADULT - REASON FOR ADMISSION
pregnancy
Pulmonary edema
pregnancy
PEC

## 2023-06-18 NOTE — DISCHARGE NOTE OB - MEDICATION SUMMARY - MEDICATIONS TO STOP TAKING
I will STOP taking the medications listed below when I get home from the hospital:    Normal Saline 0.9% 10ml pre/post infusion  -- pre and post infusion, until 6/24    Heparin 10 units/ml 3ml post infusion  -- every day w/ medication til 6/24   I will STOP taking the medications listed below when I get home from the hospital:  None

## 2023-06-18 NOTE — DISCHARGE NOTE OB - PLAN OF CARE
Please continue to follow up with Cardiologist Dr. Leone at [phone] within 2 weeks in order to monitor Heart failure with preserved ejection fraction and diastolic dysfunction found on echo during your stay. Take Motrin 600mg every 6 hours and/or tylenol 650mg every 6 hours as needed for pain. Call your OB to schedule a follow up appointment in 6 weeks. Nothing per vagina until cleared by your OB - no intercourse, douching, tampons, etc.  Call your OB if you experience severe abdominal pain not improved by oral pain medications, heavy bright red vaginal bleeding saturating more than 1 pad per hour, or fever greater than 100.4F. Consider contraception options to be discussed with your OB. # Monitor blood pressure three times a day. Please document the blood pressure values to review with obstetrician at follow-up appointment.   # If your blood pressure is equal to or greater than 160/110 (either one) OR if you experience any of the following symptoms: changes in vision, headache not relieved with Tylenol, severe abdominal pain, vomiting, increased vaginal bleeding, chest pain or shortness of breath, please return to the hospital.  # If your blood pressure is equal to or greater than 150/100 (either one), please call your obstetrician.  # Hold parameters: If the blood pressure is lower than 110/60 (either one) skip that dose of medication and inform your doctor.  # Please schedule an appointment at your physician office in one week. Please continue CTX q 24 hrs for 14 days total of antibiotics until 6/20. Follow up with Dr. Capps at [phone] within 2 weeks of discharge. Please call your doctor if you have any fevers

## 2023-06-18 NOTE — DISCHARGE NOTE OB - CARE PLAN
1 Principal Discharge DX:	Postpartum state  Assessment and plan of treatment:	Take Motrin 600mg every 6 hours and/or tylenol 650mg every 6 hours as needed for pain. Call your OB to schedule a follow up appointment in 6 weeks. Nothing per vagina until cleared by your OB - no intercourse, douching, tampons, etc.  Call your OB if you experience severe abdominal pain not improved by oral pain medications, heavy bright red vaginal bleeding saturating more than 1 pad per hour, or fever greater than 100.4F. Consider contraception options to be discussed with your OB.  Secondary Diagnosis:	Preeclampsia  Assessment and plan of treatment:	# Monitor blood pressure three times a day. Please document the blood pressure values to review with obstetrician at follow-up appointment.   # If your blood pressure is equal to or greater than 160/110 (either one) OR if you experience any of the following symptoms: changes in vision, headache not relieved with Tylenol, severe abdominal pain, vomiting, increased vaginal bleeding, chest pain or shortness of breath, please return to the hospital.  # If your blood pressure is equal to or greater than 150/100 (either one), please call your obstetrician.  # Hold parameters: If the blood pressure is lower than 110/60 (either one) skip that dose of medication and inform your doctor.  # Please schedule an appointment at your physician office in one week.  Secondary Diagnosis:	Bacteremia  Assessment and plan of treatment:	Please continue CTX q 24 hrs for 14 days total of antibiotics until 6/20. Follow up with Dr. Capps at [phone] within 2 weeks of discharge. Please call your doctor if you have any fevers  Secondary Diagnosis:	Diastolic dysfunction, left ventricle  Assessment and plan of treatment:	Please continue to follow up with Cardiologist Dr. Leone at [phone] within 2 weeks in order to monitor Heart failure with preserved ejection fraction and diastolic dysfunction found on echo during your stay.

## 2023-06-20 ENCOUNTER — TRANSCRIPTION ENCOUNTER (OUTPATIENT)
Age: 33
End: 2023-06-20

## 2023-06-22 DIAGNOSIS — I11.0 HYPERTENSIVE HEART DISEASE WITH HEART FAILURE: ICD-10-CM

## 2023-06-22 DIAGNOSIS — O41.1230 CHORIOAMNIONITIS, THIRD TRIMESTER, NOT APPLICABLE OR UNSPECIFIED: ICD-10-CM

## 2023-06-22 DIAGNOSIS — B96.20 UNSPECIFIED ESCHERICHIA COLI [E. COLI] AS THE CAUSE OF DISEASES CLASSIFIED ELSEWHERE: ICD-10-CM

## 2023-06-22 DIAGNOSIS — I50.30 UNSPECIFIED DIASTOLIC (CONGESTIVE) HEART FAILURE: ICD-10-CM

## 2023-06-22 DIAGNOSIS — I27.20 PULMONARY HYPERTENSION, UNSPECIFIED: ICD-10-CM

## 2023-06-22 DIAGNOSIS — J96.01 ACUTE RESPIRATORY FAILURE WITH HYPOXIA: ICD-10-CM

## 2023-06-22 DIAGNOSIS — Z3A.39 39 WEEKS GESTATION OF PREGNANCY: ICD-10-CM

## 2023-06-30 ENCOUNTER — APPOINTMENT (OUTPATIENT)
Dept: INFECTIOUS DISEASE | Facility: CLINIC | Age: 33
End: 2023-06-30
Payer: COMMERCIAL

## 2023-06-30 VITALS
RESPIRATION RATE: 15 BRPM | SYSTOLIC BLOOD PRESSURE: 103 MMHG | OXYGEN SATURATION: 100 % | DIASTOLIC BLOOD PRESSURE: 69 MMHG | TEMPERATURE: 97.7 F | WEIGHT: 152 LBS | HEART RATE: 59 BPM | HEIGHT: 63 IN | BODY MASS INDEX: 26.93 KG/M2

## 2023-06-30 DIAGNOSIS — R78.81 BACTEREMIA: ICD-10-CM

## 2023-06-30 DIAGNOSIS — O41.1230: ICD-10-CM

## 2023-06-30 DIAGNOSIS — B96.20 BACTEREMIA: ICD-10-CM

## 2023-06-30 PROCEDURE — 99213 OFFICE O/P EST LOW 20 MIN: CPT

## 2023-06-30 NOTE — PHYSICAL EXAM
[General Appearance - Alert] : alert [General Appearance - Well-Appearing] : healthy appearing [Sclera] : the sclera and conjunctiva were normal [Outer Ear] : the ears and nose were normal in appearance [Examination Of The Oral Cavity] : the lips and gums were normal [Oropharynx] : the oropharynx was normal with no thrush [Auscultation Breath Sounds / Voice Sounds] : lungs were clear to auscultation bilaterally [Heart Rate And Rhythm] : heart rate was normal and rhythm regular [Heart Sounds] : normal S1 and S2 [Murmurs] : no murmurs [Edema] : there was no peripheral edema [Bowel Sounds] : normal bowel sounds [Abdomen Soft] : soft [Abdomen Tenderness] : non-tender [Costovertebral Angle Tenderness] : no CVA tenderness [Musculoskeletal - Swelling] : no joint swelling [] : no rash

## 2023-06-30 NOTE — REVIEW OF SYSTEMS
[Fever] : no fever [Chills] : no chills [Eyesight Problems] : no eyesight problems [Nasal Discharge] : no nasal discharge [Sore Throat] : no sore throat [Chest Pain] : no chest pain [Abdominal Pain] : no abdominal pain [Vomiting] : no vomiting [Dysuria] : no dysuria [Joint Pain] : no joint pain [Joint Swelling] : no joint swelling

## 2023-06-30 NOTE — ASSESSMENT
[FreeTextEntry1] : 33 yo F with gestational HTN, now , admitted  for induction (pre-eclampsia) s/p vaginal delivery  c/b chorioamnionitis, postpartum hemorrhage due to atony.  Course c/b E coli bacteremia.  She complete 2 week course of ceftriaxone on . She is doing well off antibiotics. \par Follow up PRN.

## 2023-06-30 NOTE — HISTORY OF PRESENT ILLNESS
[FreeTextEntry1] : 31 yo F with gestational HTN, now , admitted  for induction (pre-eclampsia) s/p vaginal delivery  c/b chorioamnionitis, postpartum hemorrhage due to atony.  She was initially given amp and gent, remained febrile, then given cefazolin and clinda.  Overnight on , blood cultures from  grew GNR, BCID identified E coli.  She was transferred to MICU for acute hypoxic respiratory failure in setting of suspected pulmonary edema.  Was receiving pip-tazo and gent.  Gentamicin was discontinued on . Susceptibilities resulted and pip-tazo was de-escalated to ceftriaxone on 6/10. She was treated with ceftriaxone 2 g IV daily x 14 days ( – ).  She completed antibiotics without difficulty.

## 2023-11-14 NOTE — PATIENT PROFILE OB - AMNIOTIC FLUID AMOUNT, LABOR
Chief Complaints and History of Present Illnesses   Patient presents with    Glaucoma     Chief Complaint(s) and History of Present Illness(es)       Glaucoma              Laterality: left eye    Quality: blurred    Treatment side effects: redness              Comments    Pt reports decreased vision in left eye over last couple months.   Pt reports ran out of drops approximately 2 weeks ago..   Denies pain/flashes/floaters.    Sathish Negrete OA, November 14, 2023                     within normal limits
